# Patient Record
Sex: FEMALE | Race: WHITE | NOT HISPANIC OR LATINO | Employment: OTHER | URBAN - METROPOLITAN AREA
[De-identification: names, ages, dates, MRNs, and addresses within clinical notes are randomized per-mention and may not be internally consistent; named-entity substitution may affect disease eponyms.]

---

## 2017-05-26 ENCOUNTER — CONVERSION ENCOUNTER (OUTPATIENT)
Dept: MAMMOGRAPHY | Facility: HOSPITAL | Age: 57
End: 2017-05-26

## 2020-03-09 ENCOUNTER — OFFICE VISIT CONVERTED (OUTPATIENT)
Dept: FAMILY MEDICINE CLINIC | Facility: CLINIC | Age: 60
End: 2020-03-09
Attending: NURSE PRACTITIONER

## 2020-03-09 ENCOUNTER — HOSPITAL ENCOUNTER (OUTPATIENT)
Dept: LAB | Facility: HOSPITAL | Age: 60
Discharge: HOME | End: 2020-03-09
Attending: NURSE PRACTITIONER

## 2020-03-09 LAB
25(OH)D3 SERPL-MCNC: 33.1 NG/ML (ref 30–100)
ALBUMIN SERPL-MCNC: 4.6 G/DL (ref 3.5–5)
ALBUMIN/GLOB SERPL: 1.6 {RATIO} (ref 1.4–2.6)
ALP SERPL-CCNC: 78 U/L (ref 53–141)
ALT SERPL-CCNC: 12 U/L (ref 10–40)
ANION GAP SERPL CALC-SCNC: 17 MMOL/L (ref 8–19)
AST SERPL-CCNC: 19 U/L (ref 15–50)
BASOPHILS # BLD AUTO: 0.02 10*3/UL (ref 0–0.2)
BASOPHILS NFR BLD AUTO: 0.3 % (ref 0–3)
BILIRUB SERPL-MCNC: 0.63 MG/DL (ref 0.2–1.3)
BUN SERPL-MCNC: 11 MG/DL (ref 5–25)
BUN/CREAT SERPL: 16 {RATIO} (ref 6–20)
CALCIUM SERPL-MCNC: 10.6 MG/DL (ref 8.7–10.4)
CHLORIDE SERPL-SCNC: 103 MMOL/L (ref 99–111)
CHOLEST SERPL-MCNC: 253 MG/DL (ref 107–200)
CHOLEST/HDLC SERPL: 4.3 {RATIO} (ref 3–6)
CONV ABS IMM GRAN: 0.02 10*3/UL (ref 0–0.2)
CONV CO2: 25 MMOL/L (ref 22–32)
CONV IMMATURE GRAN: 0.3 % (ref 0–1.8)
CONV TOTAL PROTEIN: 7.4 G/DL (ref 6.3–8.2)
CREAT UR-MCNC: 0.7 MG/DL (ref 0.5–0.9)
DEPRECATED RDW RBC AUTO: 50.5 FL (ref 36.4–46.3)
EOSINOPHIL # BLD AUTO: 0.06 10*3/UL (ref 0–0.7)
EOSINOPHIL # BLD AUTO: 0.8 % (ref 0–7)
ERYTHROCYTE [DISTWIDTH] IN BLOOD BY AUTOMATED COUNT: 14.6 % (ref 11.7–14.4)
FOLATE SERPL-MCNC: 13.1 NG/ML (ref 4.8–20)
GFR SERPLBLD BASED ON 1.73 SQ M-ARVRAT: >60 ML/MIN/{1.73_M2}
GLOBULIN UR ELPH-MCNC: 2.8 G/DL (ref 2–3.5)
GLUCOSE SERPL-MCNC: 90 MG/DL (ref 65–99)
HCT VFR BLD AUTO: 44.5 % (ref 37–47)
HDLC SERPL-MCNC: 59 MG/DL (ref 40–60)
HGB BLD-MCNC: 14.5 G/DL (ref 12–16)
LDLC SERPL CALC-MCNC: 168 MG/DL (ref 70–100)
LYMPHOCYTES # BLD AUTO: 2.59 10*3/UL (ref 1–5)
LYMPHOCYTES NFR BLD AUTO: 36.1 % (ref 20–45)
MCH RBC QN AUTO: 30.4 PG (ref 27–31)
MCHC RBC AUTO-ENTMCNC: 32.6 G/DL (ref 33–37)
MCV RBC AUTO: 93.3 FL (ref 81–99)
MONOCYTES # BLD AUTO: 0.35 10*3/UL (ref 0.2–1.2)
MONOCYTES NFR BLD AUTO: 4.9 % (ref 3–10)
NEUTROPHILS # BLD AUTO: 4.14 10*3/UL (ref 2–8)
NEUTROPHILS NFR BLD AUTO: 57.6 % (ref 30–85)
NRBC CBCN: 0 % (ref 0–0.7)
OSMOLALITY SERPL CALC.SUM OF ELEC: 291 MOSM/KG (ref 273–304)
PLATELET # BLD AUTO: 329 10*3/UL (ref 130–400)
PMV BLD AUTO: 10.2 FL (ref 9.4–12.3)
POTASSIUM SERPL-SCNC: 3.9 MMOL/L (ref 3.5–5.3)
RBC # BLD AUTO: 4.77 10*6/UL (ref 4.2–5.4)
SODIUM SERPL-SCNC: 141 MMOL/L (ref 135–147)
T4 FREE SERPL-MCNC: 1 NG/DL (ref 0.9–1.8)
TRIGL SERPL-MCNC: 129 MG/DL (ref 40–150)
TSH SERPL-ACNC: 1.04 M[IU]/L (ref 0.27–4.2)
VIT B12 SERPL-MCNC: 398 PG/ML (ref 211–911)
VLDLC SERPL-MCNC: 26 MG/DL (ref 5–37)
WBC # BLD AUTO: 7.18 10*3/UL (ref 4.8–10.8)

## 2020-09-02 ENCOUNTER — OFFICE VISIT CONVERTED (OUTPATIENT)
Dept: GASTROENTEROLOGY | Facility: CLINIC | Age: 60
End: 2020-09-02
Attending: NURSE PRACTITIONER

## 2020-09-02 ENCOUNTER — HOSPITAL ENCOUNTER (OUTPATIENT)
Dept: CT IMAGING | Facility: HOSPITAL | Age: 60
Discharge: HOME | End: 2020-09-02
Attending: NURSE PRACTITIONER

## 2020-09-02 ENCOUNTER — HOSPITAL ENCOUNTER (OUTPATIENT)
Dept: LAB | Facility: HOSPITAL | Age: 60
Discharge: HOME | End: 2020-09-02
Attending: NURSE PRACTITIONER

## 2020-09-02 LAB
ALBUMIN SERPL-MCNC: 4.5 G/DL (ref 3.5–5)
ALBUMIN/GLOB SERPL: 1.5 {RATIO} (ref 1.4–2.6)
ALP SERPL-CCNC: 94 U/L (ref 53–141)
ALT SERPL-CCNC: 11 U/L (ref 10–40)
ANION GAP SERPL CALC-SCNC: 15 MMOL/L (ref 8–19)
AST SERPL-CCNC: 20 U/L (ref 15–50)
BASOPHILS # BLD AUTO: 0.03 10*3/UL (ref 0–0.2)
BASOPHILS NFR BLD AUTO: 0.3 % (ref 0–3)
BILIRUB SERPL-MCNC: 0.6 MG/DL (ref 0.2–1.3)
BUN SERPL-MCNC: 14 MG/DL (ref 5–25)
BUN/CREAT SERPL: 17 {RATIO} (ref 6–20)
CALCIUM SERPL-MCNC: 11.2 MG/DL (ref 8.7–10.4)
CHLORIDE SERPL-SCNC: 98 MMOL/L (ref 99–111)
CONV ABS IMM GRAN: 0.03 10*3/UL (ref 0–0.2)
CONV CO2: 29 MMOL/L (ref 22–32)
CONV IMMATURE GRAN: 0.3 % (ref 0–1.8)
CONV TOTAL PROTEIN: 7.5 G/DL (ref 6.3–8.2)
CREAT UR-MCNC: 0.81 MG/DL (ref 0.5–0.9)
DEPRECATED RDW RBC AUTO: 45.7 FL (ref 36.4–46.3)
EOSINOPHIL # BLD AUTO: 0.1 10*3/UL (ref 0–0.7)
EOSINOPHIL # BLD AUTO: 0.8 % (ref 0–7)
ERYTHROCYTE [DISTWIDTH] IN BLOOD BY AUTOMATED COUNT: 13 % (ref 11.7–14.4)
GFR SERPLBLD BASED ON 1.73 SQ M-ARVRAT: >60 ML/MIN/{1.73_M2}
GLOBULIN UR ELPH-MCNC: 3 G/DL (ref 2–3.5)
GLUCOSE SERPL-MCNC: 104 MG/DL (ref 65–99)
HCT VFR BLD AUTO: 46.8 % (ref 37–47)
HGB BLD-MCNC: 15.7 G/DL (ref 12–16)
LYMPHOCYTES # BLD AUTO: 3.56 10*3/UL (ref 1–5)
LYMPHOCYTES NFR BLD AUTO: 30.2 % (ref 20–45)
MCH RBC QN AUTO: 31.8 PG (ref 27–31)
MCHC RBC AUTO-ENTMCNC: 33.5 G/DL (ref 33–37)
MCV RBC AUTO: 94.9 FL (ref 81–99)
MONOCYTES # BLD AUTO: 0.63 10*3/UL (ref 0.2–1.2)
MONOCYTES NFR BLD AUTO: 5.3 % (ref 3–10)
NEUTROPHILS # BLD AUTO: 7.44 10*3/UL (ref 2–8)
NEUTROPHILS NFR BLD AUTO: 63.1 % (ref 30–85)
NRBC CBCN: 0 % (ref 0–0.7)
OSMOLALITY SERPL CALC.SUM OF ELEC: 289 MOSM/KG (ref 273–304)
PLATELET # BLD AUTO: 268 10*3/UL (ref 130–400)
PMV BLD AUTO: 11 FL (ref 9.4–12.3)
POTASSIUM SERPL-SCNC: 3.2 MMOL/L (ref 3.5–5.3)
RBC # BLD AUTO: 4.93 10*6/UL (ref 4.2–5.4)
SODIUM SERPL-SCNC: 139 MMOL/L (ref 135–147)
WBC # BLD AUTO: 11.79 10*3/UL (ref 4.8–10.8)

## 2020-09-04 LAB — CONV CELIAC DISEASE AB-IGA: 172 MG/DL (ref 68–408)

## 2020-09-05 LAB — TTG IGA SER-ACNC: <2 U/ML (ref 0–3)

## 2020-09-14 ENCOUNTER — HOSPITAL ENCOUNTER (OUTPATIENT)
Dept: LAB | Facility: HOSPITAL | Age: 60
Discharge: HOME | End: 2020-09-14
Attending: PHYSICIAN ASSISTANT

## 2020-09-14 ENCOUNTER — HOSPITAL ENCOUNTER (OUTPATIENT)
Dept: GENERAL RADIOLOGY | Facility: HOSPITAL | Age: 60
Discharge: HOME | End: 2020-09-14
Attending: PHYSICIAN ASSISTANT

## 2020-09-14 ENCOUNTER — OFFICE VISIT CONVERTED (OUTPATIENT)
Dept: FAMILY MEDICINE CLINIC | Facility: CLINIC | Age: 60
End: 2020-09-14
Attending: PHYSICIAN ASSISTANT

## 2020-09-14 ENCOUNTER — HOSPITAL ENCOUNTER (OUTPATIENT)
Dept: LAB | Facility: HOSPITAL | Age: 60
Discharge: HOME | End: 2020-09-14
Attending: NURSE PRACTITIONER

## 2020-09-14 LAB
ALBUMIN SERPL-MCNC: 4.4 G/DL (ref 3.5–5)
ALBUMIN/GLOB SERPL: 1.6 {RATIO} (ref 1.4–2.6)
ALP SERPL-CCNC: 82 U/L (ref 53–141)
ALT SERPL-CCNC: 11 U/L (ref 10–40)
ANION GAP SERPL CALC-SCNC: 21 MMOL/L (ref 8–19)
APPEARANCE UR: CLEAR
AST SERPL-CCNC: 20 U/L (ref 15–50)
BASOPHILS # BLD AUTO: 0.03 10*3/UL (ref 0–0.2)
BASOPHILS NFR BLD AUTO: 0.4 % (ref 0–3)
BILIRUB SERPL-MCNC: 0.5 MG/DL (ref 0.2–1.3)
BILIRUB UR QL: NEGATIVE
BUN SERPL-MCNC: 10 MG/DL (ref 5–25)
BUN/CREAT SERPL: 13 {RATIO} (ref 6–20)
CALCIUM SERPL-MCNC: 10.4 MG/DL (ref 8.7–10.4)
CHLORIDE SERPL-SCNC: 100 MMOL/L (ref 99–111)
CHOLEST SERPL-MCNC: 244 MG/DL (ref 107–200)
CHOLEST/HDLC SERPL: 4.4 {RATIO} (ref 3–6)
COLOR UR: YELLOW
CONV ABS IMM GRAN: 0.03 10*3/UL (ref 0–0.2)
CONV CO2: 22 MMOL/L (ref 22–32)
CONV COLLECTION SOURCE (UA): NORMAL
CONV IMMATURE GRAN: 0.4 % (ref 0–1.8)
CONV TOTAL PROTEIN: 7.2 G/DL (ref 6.3–8.2)
CONV UROBILINOGEN IN URINE BY AUTOMATED TEST STRIP: 1 {EHRLICHU}/DL (ref 0.1–1)
CREAT UR-MCNC: 0.75 MG/DL (ref 0.5–0.9)
DEPRECATED RDW RBC AUTO: 49.2 FL (ref 36.4–46.3)
EOSINOPHIL # BLD AUTO: 0.03 10*3/UL (ref 0–0.7)
EOSINOPHIL # BLD AUTO: 0.4 % (ref 0–7)
ERYTHROCYTE [DISTWIDTH] IN BLOOD BY AUTOMATED COUNT: 13.4 % (ref 11.7–14.4)
FOLATE SERPL-MCNC: 12.4 NG/ML (ref 4.8–20)
GFR SERPLBLD BASED ON 1.73 SQ M-ARVRAT: >60 ML/MIN/{1.73_M2}
GLOBULIN UR ELPH-MCNC: 2.8 G/DL (ref 2–3.5)
GLUCOSE SERPL-MCNC: 70 MG/DL (ref 65–99)
GLUCOSE UR QL: NEGATIVE MG/DL
HCT VFR BLD AUTO: 46.2 % (ref 37–47)
HDLC SERPL-MCNC: 56 MG/DL (ref 40–60)
HGB BLD-MCNC: 15 G/DL (ref 12–16)
HGB UR QL STRIP: NEGATIVE
KETONES UR QL STRIP: NEGATIVE MG/DL
LDLC SERPL CALC-MCNC: 154 MG/DL (ref 70–100)
LEUKOCYTE ESTERASE UR QL STRIP: NEGATIVE
LYMPHOCYTES # BLD AUTO: 2.73 10*3/UL (ref 1–5)
LYMPHOCYTES NFR BLD AUTO: 32.6 % (ref 20–45)
MCH RBC QN AUTO: 31.9 PG (ref 27–31)
MCHC RBC AUTO-ENTMCNC: 32.5 G/DL (ref 33–37)
MCV RBC AUTO: 98.3 FL (ref 81–99)
MONOCYTES # BLD AUTO: 0.42 10*3/UL (ref 0.2–1.2)
MONOCYTES NFR BLD AUTO: 5 % (ref 3–10)
NEUTROPHILS # BLD AUTO: 5.13 10*3/UL (ref 2–8)
NEUTROPHILS NFR BLD AUTO: 61.2 % (ref 30–85)
NITRITE UR QL STRIP: NEGATIVE
NRBC CBCN: 0 % (ref 0–0.7)
OSMOLALITY SERPL CALC.SUM OF ELEC: 285 MOSM/KG (ref 273–304)
PH UR STRIP.AUTO: 5.5 [PH] (ref 5–8)
PLATELET # BLD AUTO: 269 10*3/UL (ref 130–400)
PMV BLD AUTO: 11.4 FL (ref 9.4–12.3)
POTASSIUM SERPL-SCNC: 3.8 MMOL/L (ref 3.5–5.3)
PROT UR QL: NEGATIVE MG/DL
PTH-INTACT SERPL-MCNC: 39.3 PG/ML (ref 11.1–79.5)
RBC # BLD AUTO: 4.7 10*6/UL (ref 4.2–5.4)
SODIUM SERPL-SCNC: 139 MMOL/L (ref 135–147)
SP GR UR: 1.01 (ref 1–1.03)
T4 FREE SERPL-MCNC: 1.1 NG/DL (ref 0.9–1.8)
TRIGL SERPL-MCNC: 171 MG/DL (ref 40–150)
TSH SERPL-ACNC: 0.86 M[IU]/L (ref 0.27–4.2)
VIT B12 SERPL-MCNC: 308 PG/ML (ref 211–911)
VLDLC SERPL-MCNC: 34 MG/DL (ref 5–37)
WBC # BLD AUTO: 8.37 10*3/UL (ref 4.8–10.8)

## 2020-09-15 LAB
IRON SATN MFR SERPL: 15 % (ref 20–55)
IRON SERPL-MCNC: 64 UG/DL (ref 60–170)
TIBC SERPL-MCNC: 415 UG/DL (ref 245–450)
TRANSFERRIN SERPL-MCNC: 290 MG/DL (ref 250–380)

## 2020-09-16 LAB
BACTERIA SPEC AEROBE CULT: NORMAL
CONV CALCIUM IONIZED: 6 MG/DL (ref 4.5–5.6)

## 2020-10-23 ENCOUNTER — HOSPITAL ENCOUNTER (OUTPATIENT)
Dept: GENERAL RADIOLOGY | Facility: HOSPITAL | Age: 60
Discharge: HOME | End: 2020-10-23
Attending: PHYSICIAN ASSISTANT

## 2020-10-30 ENCOUNTER — HOSPITAL ENCOUNTER (OUTPATIENT)
Dept: PREADMISSION TESTING | Facility: HOSPITAL | Age: 60
Discharge: HOME | End: 2020-10-30
Attending: INTERNAL MEDICINE

## 2020-11-01 LAB — SARS-COV-2 RNA SPEC QL NAA+PROBE: NOT DETECTED

## 2020-11-04 ENCOUNTER — HOSPITAL ENCOUNTER (OUTPATIENT)
Dept: GASTROENTEROLOGY | Facility: HOSPITAL | Age: 60
Setting detail: HOSPITAL OUTPATIENT SURGERY
Discharge: HOME | End: 2020-11-04
Attending: INTERNAL MEDICINE

## 2021-05-10 NOTE — H&P
History and Physical      Patient Name: Cindy Bella   Patient ID: [id number]   Sex: Female   YOB: 1960    Primary Care Provider: Kimberly BARAHONA   Referring Provider: Tina BARAHONA    Visit Date: September 2, 2020    Provider: BROOKLYN Mujica   Location: [facility]   Location Address: [address] [place] [address], [address]  [place], [place]  [id number]   Location Phone: [phone]          Chief Complaint  · Diarrhea      History Of Present Illness  The patient is a 59 year old /White female who presents on referral from Tina BARAHONA for a gastroenterology evaluation.      She presents for evaluation of diarrhea.      She reports that when she eats, she has diarrhea.  States that diarrhea usually occurs within 30 minutes of eating.  Admits abdominal pain that's worse with meals.  Admits loss of appetite.      She has experienced a 39 lb weight loss in the past 3 months.      She reports having a brain tumor that she's had for several years.  She reports dementia that's worsening.  She does not live alone.      She has scoliosis.  Reports that she can't lay on right side due to pressure in abdomen.      3/30/2016 EGD/colonoscopy (Dr. Urena)-erosive gastritis in the antrum.  2 mm polyp in the rectum-hyperplastic, completely removed.  Random colon biopsies-lymphocytic colitis.  Duodenal biopsy-focal nonspecific villous blunting.  Gastric antrum-chronic inactive gastritis.  Positive for H. pylori.           Past Medical History  Anxiety; Arthritis; Brain Neoplasm, Malignant; Depression; Scoliosis; SVT (Supraventricular Tachycardia); Urethral stricture         Past Surgical History  Appendectomy; Bladder Biopsy; Cystoscopy; Hernia; Hysterectomy; Lumpectomy, left breast; Urethral dilatation         Medication List  amlodipine 2.5 mg oral tablet; aspirin 325 mg oral tablet; buspirone 5 mg oral tablet; citalopram 40 mg oral tablet;  "losartan-hydrochlorothiazide 50-12.5 mg oral tablet; methocarbamol 750 mg oral tablet; omeprazole 20 mg oral capsule,delayed release(DR/EC); propranolol 80 mg oral tablet; Spiriva Respimat 1.25 mcg/actuation inhalation mist; Suprep Bowel Prep Kit 17.5-3.13-1.6 gram oral recon soln; trazodone 50 mg oral tablet; Ventolin HFA 90 mcg/actuation inhalation HFA aerosol inhaler         Allergy List  Augmentin; Imitrex; Levaquin; NSAIDS; Sudafed       Allergies Reconciled  Family Medical History  Breast Neoplasm, Malignant; Pancreatic Neoplasm, Malignant; Thyroid Gland Neoplasm, Malignant; Hyperlipidemia; Cancer, Unspecified; Hypertension; Coronary artery disease; Family history of stroke         Social History  Alcohol; Tobacco (Current every day)         Review of Systems  · Constitutional  o Denies  o : chills, fever  · Eyes  o Denies  o : blurred vision, changes in vision  · Cardiovascular  o Denies  o : chest pain, irregular heart beats  · Respiratory  o Denies  o : shortness of breath, cough  · Gastrointestinal  o Admits  o : See HPI  · Genitourinary  o Denies  o : dysuria, blood in urine  · Integument  o Denies  o : rash, new skin lesions  · Neurologic  o Denies  o : tingling or numbness  · Musculoskeletal  o Denies  o : joint pain, joint swelling  · Endocrine  o Denies  o : weight gain, weight loss  · Psychiatric  o Denies  o : anxiety, depression      Vitals  Date Time BP Position Site L\R Cuff Size HR RR TEMP (F) WT  HT  BMI kg/m2 BSA m2 O2 Sat        09/02/2020 10:21 /65 Sitting      98 94lbs 4oz 5'  3\" 16.7 1.38           Physical Examination  · Constitutional  o Appearance  o : well developed, well-nourished, in no acute distress  · Eyes  o Vision  o :   § Visual Fields  § : eyes move symmetrical in all directions  o Sclerae  o : anicteric  o Pupils and Irises  o : pupils equal and symmetrical  · Neck  o Inspection/Palpation  o : supple  · Respiratory  o Respiratory Effort  o : breathing " unlabored  o Inspection of Chest  o : normal appearance, no retractions  o Auscultation of Lungs  o : clear to auscultation bilaterally  · Cardiovascular  o Heart  o :   § Auscultation of Heart  § : no murmurs, gallops or rubs  · Gastrointestinal  o Abdominal Examination  o : soft, nontender to palpation, with normal active bowel sounds, no appreciable hepatosplenomegaly  o Digital Rectal Exam  o : deferred  · Lymphatic  o Neck  o : no palpable lymphadenopathy  · Skin and Subcutaneous Tissue  o General Inspection  o : without focal lesions; turgor is normal  · Psychiatric  o General  o : Alert and oriented x3  o Mood and Affect  o : Mood and affect are appropriate to circumstances  · Extremities  o Extremities  o : No edema, no cyanosis          Assessment  · Pre-op testing     V72.84/Z01.818  · Abdominal pain     789.00/R10.9  · Diarrhea     787.91/R19.7  · Nausea alone     787.02/R11.0  · Weight loss     783.21/R63.4  · Anorexia     783.0/R63.0    Problems Reconciled  Plan  · Orders  o [facility] Pre-Op Covid-19 Screening (81716) - - 09/02/2020  o C difficile Toxigenic Assay (PCR) [facility] (84654) - - 09/02/2020  o Stool culture and sensitivity (69169) - - 09/02/2020  o Giardia and Cryptosporidium Enzyme Immunoassay [facility] (02835, 06377) - - 09/02/2020  o Lactoferrin (Fecal) Qualitative (48699) - - 09/02/2020  o Celiac Disease Reflexive Panel [facility] (CELID) - - 09/02/2020  o CBC with Auto Diff [facility] (70177) - - 09/02/2020  o CMP [facility] (62759) - - 09/02/2020  o CT abdomen and pelvis with contrast (93506) - - 09/02/2020  o Consent for Colonoscopy with Possible Biopsy - Possible risks/complications, benefits, and alternatives to surgical or invasive procedure have been explained to patient and/or legal guardian. -Patient has been evaluated and can tolerate anesthesia and/or sedation. Risks, benefits, and alternatives to anesthesia and sedation have been explained to patient and/or legal guardian. (45105) - - 09/02/2020  o Consent for  Esophagogastroduodenoscopy (EGD) - Possible risks/complications, benefits, and alternatives to surgical or invasive procedure have been explained to patient and/or legal guardian. - Patient has been evaluated and can tolerate anesthesia and/or sedation. Risks, benefits, and alternatives to anesthesia and sedation have been explained to patient and/or legal guardian. (15960) - - 09/02/2020  · Medications  o ondansetron 8 mg oral tablet,disintegrating   SIG: dissolve 1 tablet by oral route every 8 hours as needed nausea   DISP: (20) tablets with 1 refills  Prescribed on 09/02/2020     o Medications have been Reconciled  o Transition of Care or Provider Policy  · Instructions  o Handouts provided: Pre-procedure instructions including date and time and location of procedure.  o PLAN: Proceed with procedure. Patient understands risks and benefits and is willing to proceed. Understands the risks include, but are not limited to, bleeding and/or perforation.  o Information given on current diagnoses.  o Electronically Identified Patient Education Materials Provided Electronically  · Disposition  o Follow Up after procedure            Electronically Signed by: BROOKLYN Mujica -Author on September 3, 2020 04:10:01 PM

## 2021-05-13 ENCOUNTER — CONVERSION ENCOUNTER (OUTPATIENT)
Dept: FAMILY MEDICINE CLINIC | Facility: CLINIC | Age: 61
End: 2021-05-13

## 2021-05-13 ENCOUNTER — OFFICE VISIT CONVERTED (OUTPATIENT)
Dept: FAMILY MEDICINE CLINIC | Facility: CLINIC | Age: 61
End: 2021-05-13
Attending: NURSE PRACTITIONER

## 2021-05-13 NOTE — PROGRESS NOTES
"   Progress Note      Patient Name: Cindy Bella   Patient ID: [id number]   Sex: Female   YOB: 1960    Primary Care Provider: Kimberly BARAHONA   Referring Provider: Tina BARAHONA    Visit Date: September 14, 2020    Provider: HAYES Sears   Location: [facility]   Location Address: [address] [place] [address], [address]  [place], [place]  [id number]   Location Phone: [phone]          Chief Complaint  · follow up/medication refills      History Of Present Illness  Cindy Bella is a 59 year old /White female who presents for evaluation and treatment of:      patient is here for follow up/medication refills    She states she has still been loosing weight (weight loss of about 20 pounds in the past year) & has seen GI. She states she has severe pain if she eats anything but is able to drink Mt Dew which she states is what she is \"living on\"    HTN: patient taking Losartan/HCTZ, amlodipine & propranolol & states she is doing well. Denies CP/SOA/Edema/Headaches.     Anxiety: She is currently taking Celexa & Buspar & doing well    COPD: She uses Spiriva & Albuterol inhalers; pt is still smoking. Rec'd nicotine patches at [facility] in March but did not use them. Unsure when her last low dose CT was done.    Mid and low back pain: pt states that she has known scoliosis but her back has been hurting more in the past few weeks. She deines injury or trauma. She states that she smokes marijuana to help with the pain. She refuses to \"be a statistic addicted to pain medication\".    Brain Tumor: pt states memory decline and cannot remember appts. She is currently being followed by Dr. Rutledge for a brain tumor.       Past Medical History  Disease Name Date Onset Notes   Anxiety --  --    Arthritis --  --    Brain Neoplasm, Malignant --  --    Depression --  --    Scoliosis --  --    SVT (Supraventricular Tachycardia) --  --    Urethral stricture 07/23/2015 --  "         Past Surgical History  Procedure Name Date Notes   Appendectomy --  --    Bladder Biopsy --  --    Cystoscopy --  --    Hernia --  --    Hysterectomy --  --    Lumpectomy, left breast --  --    Urethral dilatation --  --          Medication List  Name Date Started Instructions   amlodipine 2.5 mg oral tablet 07/24/2020 take 1 tablet (2.5 mg) by oral route once daily for 90 days   aspirin 325 mg oral tablet  take 1 tablet (325 mg) by oral route once daily   buspirone 5 mg oral tablet 07/24/2020 take 1 tablet (5 mg) by oral route 2 times per day for 90 days   citalopram 40 mg oral tablet 07/24/2020 take 1 tablet (40 mg) by oral route once daily for 90 days   losartan-hydrochlorothiazide 50-12.5 mg oral tablet 07/24/2020 take 2 tablets by oral route daily for 90 days   methocarbamol 750 mg oral tablet 07/24/2020 take 1 tablet by oral route 2 times a day for 90 days   omeprazole 20 mg oral capsule,delayed release(DR/EC) 07/24/2020 take 1 capsule (20 mg) by oral route once daily before a meal for 90 days   ondansetron 8 mg oral tablet,disintegrating 09/02/2020 dissolve 1 tablet by oral route every 8 hours as needed nausea   potassium chloride 10 mEq oral capsule, extended release 09/09/2020 take 1 capsule (10 meq) by oral route once daily with food for 4 days   propranolol 80 mg oral tablet 07/24/2020 take 1 tablet (80 mg) by oral route 2 times per day for 90 days   Spiriva Respimat 1.25 mcg/actuation inhalation mist  inhale 2 puffs (2.5 mcg) by inhalation route once daily   Suprep Bowel Prep Kit 17.5-3.13-1.6 gram oral recon soln 09/02/2020 follow written instructions given by ordering providers office   trazodone 50 mg oral tablet 07/24/2020 take 2 tablets (100 mg) by oral route once daily at bedtime for 90 days   Ventolin HFA 90 mcg/actuation inhalation HFA aerosol inhaler  inhale 1 puff (90 mcg) by inhalation route every 6 hours as needed         Allergy List  Allergen Name Date Reaction Notes   Augmentin --   "--  --    Imitrex --  --  --    Levaquin --  nausea/diarrhea --    NSAIDS --  --  --    Sudafed --  --  --          Family Medical History  Disease Name Relative/Age Notes   Breast Neoplasm, Malignant  --    Pancreatic Neoplasm, Malignant  --    Thyroid Gland Neoplasm, Malignant  --    Hyperlipidemia  --    Cancer, Unspecified  --    Hypertension Aunt/  Brother/  Grandfather (maternal)/  Grandmother (maternal)/  Grandmother (paternal)/  Mother/  Sister/   --    Coronary artery disease  --    Family history of stroke Aunt/  Mother/  Sister/   --          Social History  Finding Status Start/Stop Quantity Notes   Alcohol --  --/-- --  --    Tobacco Current every day --/-- 3/4 ppd --          Review of Systems  · Constitutional  o Admits  o : fatigue, weight loss  o Denies  o : night sweats  · Eyes  o Denies  o : double vision, blurred vision  · HENT  o Denies  o : vertigo, recent head injury  · Breasts  o Denies  o : abnormal changes in breast size, additional breast symptoms except as noted in the HPI  · Cardiovascular  o Denies  o : chest pain, irregular heart beats  · Respiratory  o Denies  o : shortness of breath, productive cough  · Gastrointestinal  o Denies  o : nausea, vomiting  · Genitourinary  o Denies  o : dysuria, urinary retention  · Integument  o Denies  o : hair growth change, new skin lesions  · Neurologic  o Admits  o : altered mental status, memory difficulties  o Denies  o : seizures  · Musculoskeletal  o Admits  o : back pain  o Denies  o : joint swelling, limitation of motion  · Endocrine  o Denies  o : cold intolerance, heat intolerance  · Heme-Lymph  o Denies  o : petechiae, lymph node enlargement or tenderness  · Allergic-Immunologic  o Denies  o : frequent illnesses      Vitals  Date Time BP Position Site L\R Cuff Size HR RR TEMP (F) WT  HT  BMI kg/m2 BSA m2 O2 Sat HC       09/14/2020 10:56 /65 Sitting    94 - R  98.2 96lbs 2oz 5'  3\" 17.03 1.39 97 %          Physical " Examination  · Constitutional  o Appearance  o : well developed, well-nourished, no acute distress  · Head and Face  o Head  o : normocephalic, atraumatic  · Neck  o Inspection/Palpation  o : normal appearance, no masses or tenderness, trachea midline  o Thyroid  o : gland size normal, nontender, no nodules or masses present on palpation  · Respiratory  o Respiratory Effort  o : breathing unlabored  o Inspection of Chest  o : chest rise symmetric bilaterally  o Auscultation of Lungs  o : clear to auscultation bilaterally throughout inspiration and expiration  · Cardiovascular  o Heart  o :   § Auscultation of Heart  § : regular rate and rhythm, no murmurs, gallops or rubs  o Peripheral Vascular System  o :   § Extremities  § : no edema  · Gastrointestinal  o Abdomen  o : soft, non-tender, non-distended, + bowel sounds  · Lymphatic  o Neck  o : no cervical lymphadenopathy, no supraclavicular lymphadenopathy  · Psychiatric  o Mood and Affect  o : mood normal, affect appropriate  · Back  o Inspection  o : no redness, soliosis   o Palpation  o : tednerness present, swelling present   o Range of Motion  o : flexion abnormal < 60 degrees   o Gait  o : normal gait  o Special Tests  o : bilateral straight leg raise negative          Assessment  · Anxiety disorder     300.00/F41.9  · COPD (chronic obstructive pulmonary disease)     496/J44.9  · Essential hypertension     401.9/I10  · Fatigue     780.79/R53.83  · GERD (gastroesophageal reflux disease)     530.81/K21.9  · Hyperlipidemia     272.4/E78.5  · Nicotine dependence     305.1/F17.200  · Other long term (current) drug therapy     V58.69/Z79.899  · Vitamin D deficiency     268.9/E55.9  · Weight loss     783.21/R63.4  · Scoliosis     737.30/M41.9  · Back pain     724.5/M54.9  · B12 deficiency     266.2/E53.8  · Hypercalcemia     275.42/E83.52    Problems Reconciled  Plan  · Orders  o HTN/Lipid Panel (CMP, Lipid) [facility] (04636, 37086) - 401.9/I10 - 09/14/2020  o CBC with  Auto Diff [facility] (16613) - 401.9/I10 - 09/14/2020  o Urinalysis with Reflex Microscopy if abnormal ([facility]) (68885) - 401.9/I10 - 09/14/2020  o Iron panel (iron, TIBC, transferrin saturation) (29575, 40736, 23080) - 780.79/R53.83 - 09/14/2020  o Thyroid Profile (THYII, 70664, 95652) - 780.79/R53.83 - 09/14/2020  o B12 Folate levels (B12FO) - 780.79/R53.83 - 09/14/2020  o ACO-17: Screened for tobacco use AND received tobacco cessation intervention (4004F) - 305.1/F17.200 - 09/14/2020  o ACO-39: Current medications updated and reviewed () - - 09/14/2020  o Ionized calcium assay (09319) - 275.42/E83.52 - 09/14/2020  o PTH (parathyroid hormone) measurement (21708) - 275.42/E83.52 - 09/14/2020  o Xray thoracic spine 3 views [facility] (82817) - 724.5/M54.9 - 09/14/2020  o Lumbar Spine Complete X-Ray [facility] (97225) - 724.5/M54.9 - 09/14/2020  o Low dose CT scan (LDCT) for lung cancer screening [facility] () - 783.21/R63.4, 305.1/F17.200 - 09/14/2020  · Medications  o Nicoderm CQ 14 mg/24 hr transdermal patch 24 hour   SIG: apply 1 patch (14 mg) by transdermal route once daily for 6weeks then decrease to 7mg patch for 2 wks   DISP: (42) patches with 0 refills  Prescribed on 09/15/2020     o Ventolin HFA 90 mcg/actuation inhalation HFA aerosol inhaler   SIG: inhale 1 puff (90 mcg) by inhalation route every 6 hours as needed for 90 days   DISP: (3) 8 gm canister with 1 refills  Prescribed on 09/15/2020     o amlodipine 2.5 mg oral tablet   SIG: take 1 tablet (2.5 mg) by oral route once daily for 90 days   DISP: (90) tablet with 1 refills  Refilled on 09/15/2020     o buspirone 5 mg oral tablet   SIG: take 1 tablet (5 mg) by oral route 2 times per day for 90 days for 90 days   DISP: (180) tablet with 1 refills  Refilled on 09/15/2020     o citalopram 40 mg oral tablet   SIG: take 1 tablet (40 mg) by oral route once daily for 90 days for 90 days   DISP: (90) tablet with 1 refills  Refilled on 09/15/2020      o losartan-hydrochlorothiazide 50-12.5 mg oral tablet   SIG: take 2 tablets by oral route daily for 90 days for 90 days   DISP: (180) tablet with 1 refills  Refilled on 09/15/2020     o methocarbamol 750 mg oral tablet   SIG: take 1 tablet by oral route 2 times a day for 90 days for 30 days   DISP: (60) tablet with 0 refills  Refilled on 09/15/2020     o propranolol 80 mg oral tablet   SIG: take 1 tablet (80 mg) by oral route 2 times per day for 90 days for 90 days   DISP: (180) tablet with 1 refills  Refilled on 09/15/2020     o trazodone 50 mg oral tablet   SIG: take 2 tablets (100 mg) by oral route once daily at bedtime for 90 days for 90 days   DISP: (180) tablet with 1 refills  Refilled on 09/15/2020     o Medications have been Reconciled  o Transition of Care or Provider Policy  · Instructions  o Patient advised to monitor blood pressure (B/P) at home and journal readings. Patient informed that a B/P reading at home of more than 130/80 is considered hypertension. For readings greater [id number]/90 or higher patient is advised to follow up in the office with readings for management. Patient advised to limit sodium intake.  o Advised that cheeses and other sources of dairy fats, animal fats, fast food, and the extras (candy, pastries, pies, doughnuts and cookies) all contain LDL raising nutrients. Advised to increase fruits, vegetables, whole grains, and to monitor portion sizes.   o *Form of nicotine being used: cigarettes  o Patient was strongly encouraged to discontinue use of any nicotine containing product or minimize the use of the product.  o Discussed smoking cessation and counseling with patient for over 3 minutes.  o Take all medications as prescribed/directed.  o Patient was educated/instructed on their diagnosis, treatment and medications prior to discharge from the clinic today.  o Patient counseled to stop smoking.  o Patient instructed to seek medical attention urgently for new or worsening  symptoms.  o Call the office with any concerns or questions.  o Bring all medicines with their bottles to each office visit.  o Chronic conditions reviewed and taken into consideration for today's treatment plan.  o Electronically Identified Patient Education Materials Provided Electronically            Electronically Signed by: HAYES Sears -Author on September 15, 2020 05:03:30 PM

## 2021-05-14 VITALS
BODY MASS INDEX: 16.7 KG/M2 | DIASTOLIC BLOOD PRESSURE: 65 MMHG | HEIGHT: 63 IN | WEIGHT: 94.25 LBS | TEMPERATURE: 98 F | SYSTOLIC BLOOD PRESSURE: 135 MMHG

## 2021-05-14 VITALS
TEMPERATURE: 98.2 F | HEART RATE: 94 BPM | WEIGHT: 96.12 LBS | OXYGEN SATURATION: 97 % | BODY MASS INDEX: 17.03 KG/M2 | DIASTOLIC BLOOD PRESSURE: 65 MMHG | SYSTOLIC BLOOD PRESSURE: 111 MMHG | HEIGHT: 63 IN

## 2021-05-22 ENCOUNTER — TRANSCRIBE ORDERS (OUTPATIENT)
Dept: FAMILY MEDICINE CLINIC | Facility: CLINIC | Age: 61
End: 2021-05-22

## 2021-05-22 DIAGNOSIS — Z12.31 VISIT FOR SCREENING MAMMOGRAM: Primary | ICD-10-CM

## 2021-06-05 NOTE — PROGRESS NOTES
Progress Note      Patient Name: Cindy Bella   Patient ID: [id number]   Sex: Female   YOB: 1960    Primary Care Provider: Tina BARAHONA   Referring Provider: Tina BARAHONA    Visit Date: May 13, 2021    Provider: BROOKLYN Bowen   Location: [facility]   Location Address: [address] [place] [address], [address]  [place], [place]  [id number]   Location Phone: [phone]          Chief Complaint  · f/u- HTN, anxiety/depression      History Of Present Illness  Cindy Bella is a 60 year old /White female who presents for evaluation and treatment of:      Patient is here for a follow up. Patient is needing a r/f on Amlodipine, Losartan-HCTZ, Propanolol, Ventolin, Spiriva, Crestor, Methocarbamol, Ondanestron, Promethazine, Buspirone, Citalopram, and Trazodone.     Patient has a hx of     LBP-Baclofen,- states this is not helping any more.  methocarbamol- states has been out of the methocarbamol for a while   HTN- Amlodipine, Losartan-HCTZ, Propanolol  Asthma- Ventolin, Spiriva  HLD- Crestor    GERD- Omeprzole, Ondanestron, Promethazine  Anxiety/depression- Buspirone, Citalopram, Trazodone- states anxiety not well controlled at present, states having some panic attacks at times , states depression is well controlled     Patient's last mammo was 2020.    Patient's last colonoscopy was 2020.    Patient's PHQ9 score was 18. Patient denies any SI/HI.    pt admits is a ppd smoker- states she notices she is getting more SOB in last few weeks     pt states still having a lot of GI issues causing her to not eat and lose weight, saw GI and had scope but has not had f/u yet- pt states she wants number to call and sched f/u with them.       Past Medical History  Disease Name Date Onset Notes   Anxiety --  --    Arthritis --  --    Brain Neoplasm, Malignant --  --    Depression --  --    Scoliosis --  --    SVT (Supraventricular Tachycardia) --  --    Urethral  Stricture 07/23/2015 --          Past Surgical History  Procedure Name Date Notes   Appendectomy --  --    Bladder Biopsy --  --    Cystoscopy --  --    Hernia --  --    Hysterectomy --  --    Lumpectomy, left breast --  --    Urethral dilatation --  --          Medication List  Name Date Started Instructions   amlodipine 2.5 mg oral tablet 05/13/2021 take 1 tablet (2.5 mg) by oral route once daily for 90 days   aspirin 325 mg oral tablet  take 1 tablet (325 mg) by oral route once daily   buspirone 10 mg oral tablet 05/13/2021 take 1 tablet (10 mg) by oral route 2 times per day for 90 days   citalopram 40 mg oral tablet 05/13/2021 take 1 tablet (40 mg) by oral route once daily for 90 days for 90 days   Crestor 5 mg oral tablet 05/13/2021 take 1 tablet (5 mg) by oral route once daily for 30 days   losartan-hydrochlorothiazide 50-12.5 mg oral tablet 05/13/2021 take 2 tablets by oral route daily for 90 days for 90 days   methocarbamol 750 mg oral tablet 05/13/2021 take 1 tablet by oral route 2 times a day for 90 days for 30 days   Nicoderm CQ 14 mg/24 hr transdermal patch 24 hour 09/15/2020 apply 1 patch (14 mg) by transdermal route once daily for 6weeks then decrease to 7mg patch for 2 wks   omeprazole 20 mg oral capsule,delayed release(DR/EC) 09/24/2020 take 1 capsule (20 mg) by oral route once daily before a meal for 90 days for 30 days   ondansetron 8 mg oral tablet,disintegrating 11/17/2020 dissolve 1 tablet by oral route every 8 hours as needed nausea   potassium chloride 10 mEq oral capsule, extended release 09/18/2020 take 1 capsule (10 meq) by oral route once daily with food for 4 days   promethazine 12.5 mg oral tablet 09/30/2020 take 1 tablet (12.5 mg) by oral route every 6 hours as needed   propranolol 80 mg oral tablet 05/13/2021 take 1 tablet (80 mg) by oral route 2 times per day for 90 days for 90 days   Spiriva Respimat 1.25 mcg/actuation inhalation mist 05/13/2021 inhale 2 puffs (2.5 mcg) by inhalation  "route once daily for 90 days   Suprep Bowel Prep Kit 17.5-3.13-1.6 gram oral recon soln 09/02/2020 follow written instructions given by ordering providers office   trazodone 50 mg oral tablet 05/13/2021 take 2 tablets (100 mg) by oral route once daily at bedtime for 90 days for 90 days   Ventolin HFA 90 mcg/actuation inhalation HFA aerosol inhaler 05/13/2021 inhale 1 puff (90 mcg) by inhalation route every 6 hours as needed for 90 days         Allergy List  Allergen Name Date Reaction Notes   Augmentin --  --  --    Imitrex --  --  --    Levaquin --  nausea/diarrhea --    NSAIDS --  --  --    Sudafed --  --  --          Family Medical History  Disease Name Relative/Age Notes   Breast Neoplasm, Malignant  --    Pancreatic Neoplasm, Malignant  --    Thyroid Gland Neoplasm, Malignant  --    Hyperlipidemia  --    Cancer, Unspecified  --    Hypertension Aunt/  Brother/  Grandfather (maternal)/  Grandmother (maternal)/  Grandmother (paternal)/  Mother/  Sister/   --    Coronary artery disease  --    Family history of stroke Aunt/  Mother/  Sister/   --          Social History  Finding Status Start/Stop Quantity Notes   Alcohol --  --/-- --  --    Tobacco Current every day --/-- 3/4 ppd --          Review of Systems  · Constitutional  o Admits  o : weigth loss- ongoing for a long while per pt   o Denies  o : fever, fatigue,weight gain  · Cardiovascular  o Denies  o : lower extremity edema, claudication, chest pressure, palpitations  · Respiratory  o Admits  o : SOB, \"smokers cough\"   o Denies  o : wheezing, hemoptysis, dyspnea on exertion  · Gastrointestinal  o Admits  o : \"constant stomach issues\" nausea   o Denies  o : , vomiting, onstipation, abdominal pain      Vitals  Date Time BP Position Site L\R Cuff Size HR RR TEMP (F) WT  HT  BMI kg/m2 BSA m2 O2 Sat FR L/min FiO2 HC       10/28/2014 01:55 PM       14  125lbs 0oz 5'  3\" 22.14 1.59       07/23/2015 01:06 /62 Sitting       128lbs 0oz 5'  3\" 22.67 1.61     " "  09/02/2020 10:21 /65 Sitting      98 94lbs 4oz 5'  3\" 16.7 1.38       09/14/2020 10:56 /65 Sitting    94 - R  98.2 96lbs 2oz 5'  3\" 17.03 1.39 97 %  21%    05/13/2021 10:02 /73 Sitting    62 - R 18  94lbs 4oz 5'  3\" 16.7 1.38 96 %  21%          Physical Examination  · Constitutional  o Appearance  o : well developed, well-nourished, no acute distress  · Head and Face  o Head  o : normocephalic, atraumatic  · Ears, Nose, Mouth and Throat  o Ears  o :   § External Ears  § : external auditory canal appearance normal, no discharge present  § Otoscopic Examination  § : tympanic membranes pearly white/gray bilaterally  o Nose  o :   § External Nose  § : no lesions noted  § Nasopharynx  § : no discharge present  o Oral Cavity  o :   § Oral Mucosa  § : oral mucosa light pink  o Throat  o :   § Oropharynx  § : tonsils without exudate, no palatal petechiae  · Neck  o Inspection/Palpation  o : normal appearance, no masses or tenderness, trachea midline  o Thyroid  o : gland size normal, nontender, no nodules or masses present on palpation  · Respiratory  o Respiratory Effort  o : breathing unlabored  o Inspection of Chest  o : chest rise symmetric bilaterally  o Auscultation of Lungs  o : wheezing bilat   · Cardiovascular  o Heart  o :   § Auscultation of Heart  § : regular rate and rhythm, no murmurs, gallops or rubs  o Peripheral Vascular System  o :   § Extremities  § : no edema  · Lymphatic  o Neck  o : no cervical lymphadenopathy, no supraclavicular lymphadenopathy  · Psychiatric  o Mood and Affect  o : mood normal, affect appropriate          Assessment  · Screening for depression     V79.0/Z13.89  · Visit for screening mammogram     V76.12/Z12.31  · Anxiety disorder     300.00/F41.9  will trial increasing buspar dose , pt does not wish to change celexa   · COPD (chronic obstructive pulmonary disease)     496/J44.9  · Depression     311/F32.9  · Essential hypertension     401.9/I10  · GERD " (gastroesophageal reflux disease)     530.81/K21.9  · Hyperlipidemia     272.4/E78.5  · Lumbago     724.2/M54.5  will d/c baclofen as pt states this no longer helps, re-start methocarbamol   · Nicotine dependence     305.1/F17.200  discussed need to stop- pt states she is working on this- does not want medication- is trying to switch to vaping to help quit  · Osteoarthritis     715.90/M19.90  · Weight loss     783.21/R63.4  · SOB (shortness of breath)     786.05/R06.02  pt is smoker and is wheezing, states she has been out of spirvia for a while, re-start this med, will tx with MDP today and obtain CXR. f/u if no improvement. need to stop smoking       Plan  · Orders  o Annual depression screening, 15 minutes (, 79213) - V79.0/Z13.89 - 05/13/2021  o Screening Mammography; Bilateral 3D (53575, , 58142) - V76.12/Z12.31 - 05/13/2021  o Chest xray 2 views [facility] (97687) - 496/J44.9 - 05/13/2021   wheezing   o Smoking cessation counseling, 3-10 minutes [facility] (79675) - 496/J44.9 - 05/13/2021  o ACO-17: Screened for tobacco use AND received tobacco cessation intervention (4004F) - 496/J44.9 - 05/13/2021  o Smoking cessation counseling, 3-10 minutes [facility] (26744) - 305.1/F17.200 - 05/13/2021   discussed with patient to stop smoking for 3 min  o ACO-17: Screened for tobacco use AND received tobacco cessation intervention (4004F) - 305.1/F17.200 - 05/13/2021  o Physical, Primary Care Panel (CBC, CMP, Lipid, TSH) [facility] (08336, 76317, 94044, 57491) - 401.9/I10, 272.4/E78.5, 783.21/R63.4 - 05/13/2021  o Thyroid Profile (86542, 80695, THYII) - 783.21/R63.4 - 05/13/2021  o ACO-17: Screened for tobacco use AND received tobacco cessation intervention (4004F) - - 05/13/2021  o ACO-19: Colorectal cancer screening results documented and reviewed (3017F) - - 05/13/2021  o ACO-39: Current medications updated and reviewed (1159F, ) - - 05/13/2021  · Medications  o Medrol (Hong) 4 mg oral tablets,dose pack   SIG: take by oral route as  directed per package instructions   DISP: (1) Package with 0 refills  Prescribed on 05/13/2021     o Spiriva Respimat 1.25 mcg/actuation inhalation mist   SIG: inhale 2 puffs (2.5 mcg) by inhalation route once daily for 90 days   DISP: (180) Inhaler with 1 refills  Prescribed on 05/13/2021     o buspirone 10 mg oral tablet   SIG: take 1 tablet (10 mg) by oral route 2 times per day for 90 days   DISP: (180) Tablet with 1 refills  Adjusted on 05/13/2021     o amlodipine 2.5 mg oral tablet   SIG: take 1 tablet (2.5 mg) by oral route once daily for 90 days   DISP: (90) Tablet with 1 refills  Refilled on 05/13/2021     o citalopram 40 mg oral tablet   SIG: take 1 tablet (40 mg) by oral route once daily for 90 days for 90 days   DISP: (90) Tablet with 1 refills  Refilled on 05/13/2021     o Crestor 5 mg oral tablet   SIG: take 1 tablet (5 mg) by oral route once daily for 30 days   DISP: (30) Tablet with 5 refills  Refilled on 05/13/2021     o losartan-hydrochlorothiazide 50-12.5 mg oral tablet   SIG: take 2 tablets by oral route daily for 90 days for 90 days   DISP: (180) Tablet with 1 refills  Refilled on 05/13/2021     o methocarbamol 750 mg oral tablet   SIG: take 1 tablet by oral route 2 times a day for 90 days for 30 days   DISP: (60) Tablet with 2 refills  Refilled on 05/13/2021     o propranolol 80 mg oral tablet   SIG: take 1 tablet (80 mg) by oral route 2 times per day for 90 days for 90 days   DISP: (180) Tablet with 1 refills  Refilled on 05/13/2021     o trazodone 50 mg oral tablet   SIG: take 2 tablets (100 mg) by oral route once daily at bedtime for 90 days for 90 days   DISP: (180) Tablet with 1 refills  Refilled on 05/13/2021     o Ventolin HFA 90 mcg/actuation inhalation HFA aerosol inhaler   SIG: inhale 1 puff (90 mcg) by inhalation route every 6 hours as needed for 90 days   DISP: (3) Insert with 1 refills  Refilled on 05/13/2021     o baclofen 10 mg oral tablet   SIG: take 1 tablet (10 mg) by oral  route 3 times per day   DISP: (0) Tablet with 0 refills  Discontinued on 05/13/2021     o Medications have been Reconciled  o Transition of Care or Provider Policy  · Instructions  o Depression Screen completed and scanned into the EMR under the designated folder within the patient's documents.  o Discussed the need for therapy, either with a certified counselor, psychologist, and/or family . If no improvement is noted or worsening of their condition, return to office or ER. But also discussed with patient that if they are non-responsive to the type of medication they may need to see a psychiatrist for further evaluation and management.  o Advised that cheeses and other sources of dairy fats, animal fats, fast food, and the extras (candy, pastries, pies, doughnuts and cookies) all contain LDL raising nutrients. Advised to increase fruits, vegetables, whole grains, and to monitor portion sizes.   o *Form of nicotine being used:   o Patient was strongly encouraged to discontinue use of any nicotine containing product or minimize the use of the product.  o Take all medications as prescribed/directed.  o Patient was educated/instructed on their diagnosis, treatment and medications prior to discharge from the clinic today.  o Patient instructed to seek medical attention urgently for new or worsening symptoms.  o Call the office with any concerns or questions.  o Chronic conditions reviewed and taken into consideration for today's treatment plan.  · Disposition  o Call or Return if symptoms worsen or persist.  o Follow Up PRN  o Follow Up in 2 months  o Follow Up in 6 months     pt given phone # for GI to call and sched her f/u             Electronically Signed by: BROOKLYN Bowen -Author on May 26, 2021 03:29:04 PM

## 2021-06-22 RX ORDER — METHYLPREDNISOLONE 4 MG/1
TABLET ORAL
Qty: 21 EACH | OUTPATIENT
Start: 2021-06-22

## 2021-07-15 VITALS
SYSTOLIC BLOOD PRESSURE: 124 MMHG | OXYGEN SATURATION: 96 % | HEIGHT: 63 IN | DIASTOLIC BLOOD PRESSURE: 73 MMHG | RESPIRATION RATE: 18 BRPM | BODY MASS INDEX: 16.7 KG/M2 | HEART RATE: 62 BPM | WEIGHT: 94.25 LBS

## 2021-08-04 RX ORDER — ONDANSETRON 8 MG/1
TABLET, ORALLY DISINTEGRATING ORAL
Qty: 20 TABLET | Refills: 0 | Status: SHIPPED | OUTPATIENT
Start: 2021-08-04 | End: 2022-05-03 | Stop reason: SDUPTHER

## 2021-08-17 ENCOUNTER — TELEMEDICINE (OUTPATIENT)
Dept: FAMILY MEDICINE CLINIC | Facility: CLINIC | Age: 61
End: 2021-08-17

## 2021-08-17 DIAGNOSIS — I10 ESSENTIAL HYPERTENSION: ICD-10-CM

## 2021-08-17 DIAGNOSIS — E78.2 MIXED HYPERLIPIDEMIA: ICD-10-CM

## 2021-08-17 DIAGNOSIS — R19.7 DIARRHEA, UNSPECIFIED TYPE: ICD-10-CM

## 2021-08-17 DIAGNOSIS — Z86.79 HISTORY OF ANEURYSM: ICD-10-CM

## 2021-08-17 DIAGNOSIS — F41.9 ANXIETY: Primary | ICD-10-CM

## 2021-08-17 DIAGNOSIS — F17.200 SMOKER: ICD-10-CM

## 2021-08-17 DIAGNOSIS — J44.9 CHRONIC OBSTRUCTIVE PULMONARY DISEASE, UNSPECIFIED COPD TYPE (HCC): ICD-10-CM

## 2021-08-17 PROCEDURE — 99214 OFFICE O/P EST MOD 30 MIN: CPT | Performed by: NURSE PRACTITIONER

## 2021-08-17 RX ORDER — PROPRANOLOL HYDROCHLORIDE 80 MG/1
80 TABLET ORAL DAILY
Qty: 90 TABLET | Refills: 1 | Status: SHIPPED | OUTPATIENT
Start: 2021-08-17 | End: 2022-01-18 | Stop reason: SDUPTHER

## 2021-08-17 RX ORDER — AMLODIPINE BESYLATE 2.5 MG/1
2.5 TABLET ORAL DAILY
Qty: 90 TABLET | Refills: 1 | Status: SHIPPED | OUTPATIENT
Start: 2021-08-17 | End: 2022-05-02 | Stop reason: SDUPTHER

## 2021-08-17 RX ORDER — ROSUVASTATIN CALCIUM 5 MG/1
5 TABLET, COATED ORAL DAILY
COMMUNITY
Start: 2021-08-11 | End: 2021-08-17 | Stop reason: SDUPTHER

## 2021-08-17 RX ORDER — IBUPROFEN 800 MG/1
800 TABLET ORAL DAILY
COMMUNITY
End: 2022-05-03

## 2021-08-17 RX ORDER — BACLOFEN 10 MG/1
10 TABLET ORAL DAILY PRN
COMMUNITY
End: 2022-08-03

## 2021-08-17 RX ORDER — METHOCARBAMOL 750 MG/1
750 TABLET, FILM COATED ORAL 3 TIMES DAILY
COMMUNITY
End: 2021-09-30 | Stop reason: SDUPTHER

## 2021-08-17 RX ORDER — LOSARTAN POTASSIUM AND HYDROCHLOROTHIAZIDE 12.5; 5 MG/1; MG/1
1 TABLET ORAL DAILY
COMMUNITY
End: 2021-08-17 | Stop reason: SDUPTHER

## 2021-08-17 RX ORDER — AMLODIPINE BESYLATE 2.5 MG/1
2.5 TABLET ORAL DAILY
COMMUNITY
Start: 2021-08-11 | End: 2021-08-17 | Stop reason: SDUPTHER

## 2021-08-17 RX ORDER — BUSPIRONE HYDROCHLORIDE 10 MG/1
10 TABLET ORAL 2 TIMES DAILY PRN
COMMUNITY
Start: 2021-08-11 | End: 2021-11-09

## 2021-08-17 RX ORDER — CITALOPRAM 40 MG/1
40 TABLET ORAL DAILY
COMMUNITY
Start: 2021-08-11 | End: 2022-02-01

## 2021-08-17 RX ORDER — ALBUTEROL SULFATE 1.25 MG/3ML
1 SOLUTION RESPIRATORY (INHALATION) 3 TIMES DAILY PRN
Qty: 1 EACH | Refills: 2 | Status: SHIPPED | OUTPATIENT
Start: 2021-08-17 | End: 2022-05-03

## 2021-08-17 RX ORDER — LOSARTAN POTASSIUM AND HYDROCHLOROTHIAZIDE 12.5; 5 MG/1; MG/1
1 TABLET ORAL DAILY
Qty: 90 TABLET | Refills: 1 | Status: SHIPPED | OUTPATIENT
Start: 2021-08-17 | End: 2021-11-15

## 2021-08-17 RX ORDER — ATORVASTATIN CALCIUM 40 MG/1
40 TABLET, FILM COATED ORAL DAILY
COMMUNITY
End: 2021-08-17

## 2021-08-17 RX ORDER — OMEPRAZOLE 20 MG/1
20 CAPSULE, DELAYED RELEASE ORAL DAILY
COMMUNITY
End: 2022-05-03 | Stop reason: SDUPTHER

## 2021-08-17 RX ORDER — ALBUTEROL SULFATE 90 UG/1
2 AEROSOL, METERED RESPIRATORY (INHALATION)
COMMUNITY
End: 2021-11-09

## 2021-08-17 RX ORDER — TRAZODONE HYDROCHLORIDE 100 MG/1
100 TABLET ORAL DAILY
COMMUNITY
End: 2022-09-06 | Stop reason: SDUPTHER

## 2021-08-17 RX ORDER — ROSUVASTATIN CALCIUM 5 MG/1
5 TABLET, COATED ORAL DAILY
Qty: 90 TABLET | Refills: 1 | Status: SHIPPED | OUTPATIENT
Start: 2021-08-17 | End: 2022-05-02 | Stop reason: SDUPTHER

## 2021-08-17 RX ORDER — PROPRANOLOL HYDROCHLORIDE 80 MG/1
80 TABLET ORAL DAILY
COMMUNITY
Start: 2021-08-11 | End: 2021-08-17 | Stop reason: SDUPTHER

## 2021-08-17 RX ORDER — ASPIRIN 325 MG
325 TABLET ORAL DAILY
COMMUNITY
End: 2022-05-03

## 2021-08-17 NOTE — ASSESSMENT & PLAN NOTE
Not well controlled at present per patient report, she requests referral to psychiatry for further management and potential counseling.  Referral made.

## 2021-08-17 NOTE — PROGRESS NOTES
You have chosen to receive care through a telehealth visit.  Do you consent to use a video/audio connection for your medical care today? YES    Chief Complaint  HTN, HL, f/u med RFs'   Subjective          Cindy Bella presents to [facility] for   History of Present Illness    Patient is here for a follow up on HTN, HLD.  States doing well on all of her medications.  Is unsure she needs refills at present, thinks she may on her blood pressure medication, states that she will have the pharmacy send request if she needs anything else.    Patient is requesting a cxr, was told she has a hx of an aneurysm. States was sent for an US many years ago.    Pt smoking still, down to 1/2 ppd. States anxiety makes it hard to quit, wants to see a psych provider for some counseling, thinks getting the anxiety under better control will allow her to finally quit.    Patient states she is still having a lot of diarrhea, states weight has stabilized at 94 pounds but that she has not heard anything from GI since her colonoscopy.  Would like to go back and see them.    Patient requests refill of nebulizer machine medication.  States she uses on a as needed basis.  Patient also states she needs a new nebulizer tubing.    Medical History  Past Medical History:   Diagnosis Date   • Anxiety    • Arthritis    • Brain neoplasm malignant (CMS/HCC)    • Depression    • Scoliosis    • SVT (supraventricular tachycardia) (CMS/HCC)    • Urethral stricture 07/23/2015     Surgical History  History reviewed. No pertinent surgical history.  Social History  Social History     Socioeconomic History   • Marital status:      Spouse name: Not on file   • Number of children: Not on file   • Years of education: Not on file   • Highest education level: Not on file   Tobacco Use   • Smoking status: Current Every Day Smoker   • Smokeless tobacco: Never Used   • Tobacco comment: 3/4 ppd   Vaping Use   • Vaping Use:  Never used   Substance and Sexual Activity   • Alcohol use: Not Currently   • Drug use: Yes     Types: Marijuana   • Sexual activity: Defer       Current Outpatient Medications:   •  albuterol sulfate  (90 Base) MCG/ACT inhaler, Inhale 2 puffs., Disp: , Rfl:   •  amLODIPine (NORVASC) 2.5 MG tablet, Take 1 tablet by mouth Daily., Disp: 90 tablet, Rfl: 1  •  aspirin 325 MG tablet, Take 325 mg by mouth Daily., Disp: , Rfl:   •  baclofen (LIORESAL) 10 MG tablet, Take 10 mg by mouth Daily As Needed., Disp: , Rfl:   •  busPIRone (BUSPAR) 10 MG tablet, Take 10 mg by mouth 2 (Two) Times a Day As Needed., Disp: , Rfl:   •  citalopram (CeleXA) 40 MG tablet, Take 40 mg by mouth Daily., Disp: , Rfl:   •  ibuprofen (ADVIL,MOTRIN) 800 MG tablet, Take 800 mg by mouth Daily., Disp: , Rfl:   •  losartan-hydrochlorothiazide (HYZAAR) 50-12.5 MG per tablet, Take 1 tablet by mouth Daily., Disp: 90 tablet, Rfl: 1  •  methocarbamol (ROBAXIN) 750 MG tablet, Take 750 mg by mouth 3 (Three) Times a Day., Disp: , Rfl:   •  omeprazole (priLOSEC) 20 MG capsule, Take 20 mg by mouth Daily., Disp: , Rfl:   •  ondansetron ODT (ZOFRAN-ODT) 8 MG disintegrating tablet, DISSOLVE 1 TABLET ON THE TONGUE EVERY 8 HOURS AS NEEDED FOR NAUSEA, Disp: 20 tablet, Rfl: 0  •  propranolol (INDERAL) 80 MG tablet, Take 1 tablet by mouth Daily., Disp: 90 tablet, Rfl: 1  •  rosuvastatin (CRESTOR) 5 MG tablet, Take 1 tablet by mouth Daily., Disp: 90 tablet, Rfl: 1  •  Tiotropium Bromide Monohydrate (SPIRIVA RESPIMAT) 1.25 MCG/ACT aerosol solution inhaler, Inhale 2 puffs Daily., Disp: , Rfl:   •  traZODone (DESYREL) 100 MG tablet, Take 100 mg by mouth Daily., Disp: , Rfl:   •  albuterol (ACCUNEB) 1.25 MG/3ML nebulizer solution, Take 3 mL by nebulization 3 (Three) Times a Day As Needed for Wheezing or Shortness of Air., Disp: 1 each, Rfl: 2    Review of Systems     Objective     There were no vitals taken for this visit.    There is no height or weight on file to  calculate BMI.    Physical Exam  Vitals reviewed.   Constitutional:       Appearance: Normal appearance. She is well-developed.   HENT:      Head: Normocephalic and atraumatic.      Right Ear: External ear normal.      Left Ear: External ear normal.   Eyes:      Conjunctiva/sclera: Conjunctivae normal.   Pulmonary:      Effort: Pulmonary effort is normal.   Neurological:      Mental Status: She is alert and oriented to person, place, and time.   Psychiatric:         Mood and Affect: Mood and affect normal.         Behavior: Behavior normal.         Thought Content: Thought content normal.         Judgment: Judgment normal.         Result Review :     The following data was reviewed by: BROOKLYN Boewn on 08/17/2021:    CMP    CMP 9/2/20 9/14/20   Glucose 104 (A) 70   BUN 14 10   Creatinine 0.81 0.75   Sodium 139 139   Potassium 3.2 (A) 3.8   Chloride 98 (A) 100   Calcium 11.2 (A) 10.4   Albumin 4.5 4.4   Total Bilirubin 0.60 0.50   Alkaline Phosphatase 94 82   AST (SGOT) 20 20   ALT (SGPT) 11 11   (A) Abnormal value            CBC    CBC 9/2/20 9/14/20   WBC 11.79 (A) 8.37   RBC 4.93 4.70   Hemoglobin 15.7 15.0   Hematocrit 46.8 46.2   MCV 94.9 98.3   MCH 31.8 (A) 31.9 (A)   MCHC 33.5 32.5 (A)   RDW 13.0 13.4   Platelets 268 269   (A) Abnormal value            Lipid Panel    Lipid Panel 9/14/20   Total Cholesterol 244 (A)   Triglycerides 171 (A)   HDL Cholesterol 56   VLDL Cholesterol 34   LDL Cholesterol  154 (A)   (A) Abnormal value       Comments are available for some flowsheets but are not being displayed.           TSH    TSH 9/14/20   TSH 0.859                              Assessment:  Diagnoses and all orders for this visit:    1. Anxiety (Primary)  Assessment & Plan:  Not well controlled at present per patient report, she requests referral to psychiatry for further management and potential counseling.  Referral made.    Orders:  -     Ambulatory Referral to Psychiatry    2. Diarrhea, unspecified  type  -     Ambulatory Referral to Gastroenterology    3. History of aneurysm  -     US AAA Screen Limited; Future    4. Essential hypertension  Assessment & Plan:  Hypertension is well controlled per patient report, patient being seen via telehealth unable to assess blood pressure in office today.  Patient is to continue current dose of medication and continue monitoring blood pressure at home.    Orders:  -     propranolol (INDERAL) 80 MG tablet; Take 1 tablet by mouth Daily.  Dispense: 90 tablet; Refill: 1  -     losartan-hydrochlorothiazide (HYZAAR) 50-12.5 MG per tablet; Take 1 tablet by mouth Daily.  Dispense: 90 tablet; Refill: 1  -     amLODIPine (NORVASC) 2.5 MG tablet; Take 1 tablet by mouth Daily.  Dispense: 90 tablet; Refill: 1  -     Comprehensive Metabolic Panel  -     Lipid Panel    5. Mixed hyperlipidemia  Assessment & Plan:  Lipids poorly controlled at last check, patient due for lab recheck.  Continue Crestor labs ordered today.    Orders:  -     rosuvastatin (CRESTOR) 5 MG tablet; Take 1 tablet by mouth Daily.  Dispense: 90 tablet; Refill: 1  -     Lipid Panel    6. Chronic obstructive pulmonary disease, unspecified COPD type (CMS/[facility])  Assessment & Plan:  COPD is stable, patient working on smoking cessation, continue current medications and refill of nebulizer treatments sent.  Encourage patient to continue to work on smoking cessation.        Orders:  -     albuterol (ACCUNEB) 1.25 MG/3ML nebulizer solution; Take 3 mL by nebulization 3 (Three) Times a Day As Needed for Wheezing or Shortness of Air.  Dispense: 1 each; Refill: 2    7. Smoker    Cindy Bella  reports that she has been smoking. She has never used smokeless tobacco.. I have educated her on the risk of diseases from using tobacco products such as cancer, COPD and heart disease.     I advised her to quit and she is not willing to quit.  Patient states she is not willing to quit cold turkey today, however she is still working on  cutting down.    I spent 3  minutes counseling the patient.               Follow Up     No follow-ups on file.    Patient was given instructions and counseling regarding her condition or for health maintenance advice. Please see specific information pulled into the AVS if appropriate.     Tina Hartley, APRN  08/17/2021

## 2021-08-17 NOTE — ASSESSMENT & PLAN NOTE
COPD is stable, patient working on smoking cessation, continue current medications and refill of nebulizer treatments sent.  Encourage patient to continue to work on smoking cessation.

## 2021-08-17 NOTE — ASSESSMENT & PLAN NOTE
Lipids poorly controlled at last check, patient due for lab recheck.  Continue Crestor labs ordered today.

## 2021-08-17 NOTE — ASSESSMENT & PLAN NOTE
Hypertension is well controlled per patient report, patient being seen via telehealth unable to assess blood pressure in office today.  Patient is to continue current dose of medication and continue monitoring blood pressure at home.   DR Chavarria #[id number]

## 2021-09-03 ENCOUNTER — HOSPITAL ENCOUNTER (OUTPATIENT)
Dept: ULTRASOUND IMAGING | Facility: HOSPITAL | Age: 61
Discharge: HOME | End: 2021-09-03
Admitting: NURSE PRACTITIONER

## 2021-09-03 DIAGNOSIS — Z86.79 HISTORY OF ANEURYSM: ICD-10-CM

## 2021-09-03 PROCEDURE — 76706 US ABDL AORTA SCREEN AAA: CPT

## 2021-09-30 DIAGNOSIS — G89.29 OTHER CHRONIC PAIN: Primary | ICD-10-CM

## 2021-09-30 RX ORDER — METHOCARBAMOL 750 MG/1
750 TABLET, FILM COATED ORAL 3 TIMES DAILY
Qty: 270 TABLET | Refills: 1 | Status: SHIPPED | OUTPATIENT
Start: 2021-09-30 | End: 2022-05-02 | Stop reason: SDUPTHER

## 2021-10-27 ENCOUNTER — TELEPHONE (OUTPATIENT)
Dept: FAMILY MEDICINE CLINIC | Facility: CLINIC | Age: 61
End: 2021-10-27

## 2021-10-27 NOTE — TELEPHONE ENCOUNTER
----- Message from BROOKLYN Stewart sent at 10/27/2021  1:58 PM EDT -----  Recommend patient reschedule  ----- Message -----  From: Leidy Madera  Sent: 10/27/2021   1:56 PM EDT  To: BROOKLYN Stewart    Patient no showed mammogram on 9/10/21  ----- Message -----  From: SYSTEM  Sent: 9/15/2021   1:14 AM EDT  To: [facility] Clinical Pool

## 2021-10-27 NOTE — TELEPHONE ENCOUNTER
Phoned patient left a message that Tina recommends her to get her mammogram. I left the number so the patient can call and schedule the appointment.

## 2021-11-09 RX ORDER — ALBUTEROL SULFATE 90 UG/1
AEROSOL, METERED RESPIRATORY (INHALATION)
Qty: 54 G | Refills: 0 | Status: SHIPPED | OUTPATIENT
Start: 2021-11-09 | End: 2021-12-06

## 2021-11-09 RX ORDER — BUSPIRONE HYDROCHLORIDE 10 MG/1
TABLET ORAL
Qty: 180 TABLET | Refills: 0 | Status: SHIPPED | OUTPATIENT
Start: 2021-11-09 | End: 2022-05-02 | Stop reason: SDUPTHER

## 2021-11-11 PROBLEM — C71.9 MALIGNANT NEOPLASM OF BRAIN: Status: ACTIVE | Noted: 2021-11-11

## 2021-11-11 PROBLEM — M41.20 IDIOPATHIC SCOLIOSIS AND KYPHOSCOLIOSIS: Status: ACTIVE | Noted: 2017-07-10

## 2021-11-11 PROBLEM — I47.1 SVT (SUPRAVENTRICULAR TACHYCARDIA): Status: ACTIVE | Noted: 2021-11-11

## 2021-11-11 PROBLEM — F32.A DEPRESSION: Status: ACTIVE | Noted: 2021-11-11

## 2021-11-11 PROBLEM — M19.90 ARTHRITIS: Status: ACTIVE | Noted: 2021-11-11

## 2021-11-11 PROBLEM — M41.9 SCOLIOSIS: Status: ACTIVE | Noted: 2021-11-11

## 2021-11-11 PROBLEM — I47.10 SVT (SUPRAVENTRICULAR TACHYCARDIA): Status: ACTIVE | Noted: 2021-11-11

## 2021-11-15 DIAGNOSIS — I10 ESSENTIAL HYPERTENSION: ICD-10-CM

## 2021-11-15 RX ORDER — LOSARTAN POTASSIUM AND HYDROCHLOROTHIAZIDE 12.5; 5 MG/1; MG/1
TABLET ORAL
Qty: 180 TABLET | Refills: 0 | Status: SHIPPED | OUTPATIENT
Start: 2021-11-15 | End: 2022-05-02 | Stop reason: SDUPTHER

## 2021-12-06 RX ORDER — ALBUTEROL SULFATE 90 UG/1
AEROSOL, METERED RESPIRATORY (INHALATION)
Qty: 54 G | Refills: 0 | Status: SHIPPED | OUTPATIENT
Start: 2021-12-06 | End: 2022-08-02 | Stop reason: SDUPTHER

## 2022-01-18 DIAGNOSIS — I10 ESSENTIAL HYPERTENSION: ICD-10-CM

## 2022-01-18 NOTE — TELEPHONE ENCOUNTER
Caller: Cindy Bella    Relationship: Self    Best call back number: [phone]    Requested Prescriptions:   Requested Prescriptions      No prescriptions requested or ordered in this encounter      propranolol 80 mg oral tablet 05/13/2021 take 1 tablet (80 mg) by oral route 2 times per day for 90 days for 90 days         [facility] where [facility] be sent[id number] - [place], [place] - [address] AT [facility] [phone] [facility] [phone]      Additional details provided by patient: PATIENT IS OUT OF MEDICATION ; PATIENT REPORTS SHE HAS ALWAYS TAKEN MEDICATION TWICE DAILY; IT WAS SENT IN INCORRECTLY LAST TIME, SO SHE HAS RAN OUT DUE TO TAKING IT TWICE A DAY     Does the patient have less than a 3 day supply:  [x] Yes  [] No    Tiffanie Rosales Rep   01/18/22 14:16 EST

## 2022-01-18 NOTE — TELEPHONE ENCOUNTER
Patient says she takes this twice daily. I have the medicine ordered but did not want to send without your okay.     Thanks!

## 2022-01-19 RX ORDER — PROPRANOLOL HYDROCHLORIDE 80 MG/1
80 TABLET ORAL 2 TIMES DAILY
Qty: 180 TABLET | Refills: 1 | Status: SHIPPED | OUTPATIENT
Start: 2022-01-19 | End: 2022-05-02 | Stop reason: SDUPTHER

## 2022-02-01 RX ORDER — CITALOPRAM 40 MG/1
TABLET ORAL
Qty: 90 TABLET | Refills: 0 | Status: SHIPPED | OUTPATIENT
Start: 2022-02-01 | End: 2022-05-02 | Stop reason: SDUPTHER

## 2022-02-09 ENCOUNTER — TELEPHONE (OUTPATIENT)
Dept: FAMILY MEDICINE CLINIC | Facility: CLINIC | Age: 62
End: 2022-02-09

## 2022-02-09 NOTE — TELEPHONE ENCOUNTER
Caller: Cindy Bella    Relationship: Self    Best call back number: [phone]    What orders are you requesting (i.e. lab or imaging): 3 WAY WALKER, UPPER BACK TRACTION    In what timeframe would the patient need to come in: WHEN POSSIBLE    Additional notes: PATIENT IS FEELING UNSTABLE AND WOULD LIKE TO GET A 3 WAY WALKER SO SHE CAN GO BACK TO MOVING AND GETTING OUT. SHE WOULD ALSO LIKE TO GET UPPER BACK TRACTION.

## 2022-02-10 NOTE — TELEPHONE ENCOUNTER
Pt is going to need an appointment for these DME will need to do exam and have progress note with findings for insurance to cover

## 2022-04-26 ENCOUNTER — TELEPHONE (OUTPATIENT)
Dept: FAMILY MEDICINE CLINIC | Facility: CLINIC | Age: 62
End: 2022-04-26

## 2022-04-26 NOTE — TELEPHONE ENCOUNTER
Caller: Cindy Bella    Relationship: Self    Best call back number: [phone]    Requested Prescriptions: propranolol (INDERAL) 80 MG tablet  Requested Prescriptions      No prescriptions requested or ordered in this encounter        Pharmacy where request should be sent:      [id number] - [place], [place] - [address] AT [facility] [phone] [facility] [phone]   [phone]    Additional details provided by patient: HAS 1 DAY LEFT    Does the patient have less than a 3 day supply:  [x] Yes  [] No    Tiffanie Chou Rep   04/26/22 09:38 EDT     PATIENT HAS UPCOMING APPOINTMENT ON 05/03/22 WITH JAN. PLEASE CALL PATIENT ONCE SENT TO PHARMACY

## 2022-04-28 DIAGNOSIS — I10 ESSENTIAL HYPERTENSION: ICD-10-CM

## 2022-05-02 RX ORDER — CITALOPRAM 40 MG/1
TABLET ORAL
Qty: 90 TABLET | Refills: 0 | OUTPATIENT
Start: 2022-05-02

## 2022-05-02 RX ORDER — LOSARTAN POTASSIUM AND HYDROCHLOROTHIAZIDE 12.5; 5 MG/1; MG/1
TABLET ORAL
Qty: 180 TABLET | Refills: 0 | OUTPATIENT
Start: 2022-05-02

## 2022-05-03 ENCOUNTER — TELEPHONE (OUTPATIENT)
Dept: FAMILY MEDICINE CLINIC | Facility: CLINIC | Age: 62
End: 2022-05-03

## 2022-05-03 ENCOUNTER — HOSPITAL ENCOUNTER (OUTPATIENT)
Dept: GENERAL RADIOLOGY | Facility: HOSPITAL | Age: 62
Discharge: HOME | End: 2022-05-03
Admitting: NURSE PRACTITIONER

## 2022-05-03 ENCOUNTER — OFFICE VISIT (OUTPATIENT)
Dept: FAMILY MEDICINE CLINIC | Facility: CLINIC | Age: 62
End: 2022-05-03

## 2022-05-03 VITALS
BODY MASS INDEX: 17.12 KG/M2 | SYSTOLIC BLOOD PRESSURE: 124 MMHG | OXYGEN SATURATION: 96 % | DIASTOLIC BLOOD PRESSURE: 82 MMHG | WEIGHT: 96.6 LBS | HEART RATE: 93 BPM | HEIGHT: 63 IN

## 2022-05-03 DIAGNOSIS — Z87.891 HISTORY OF SMOKING: ICD-10-CM

## 2022-05-03 DIAGNOSIS — F41.9 ANXIETY: ICD-10-CM

## 2022-05-03 DIAGNOSIS — I10 ESSENTIAL HYPERTENSION: ICD-10-CM

## 2022-05-03 DIAGNOSIS — K21.9 GASTROESOPHAGEAL REFLUX DISEASE, UNSPECIFIED WHETHER ESOPHAGITIS PRESENT: ICD-10-CM

## 2022-05-03 DIAGNOSIS — Z11.59 NEED FOR HEPATITIS C SCREENING TEST: ICD-10-CM

## 2022-05-03 DIAGNOSIS — L98.9 SKIN LESION OF BACK: ICD-10-CM

## 2022-05-03 DIAGNOSIS — G89.29 CHRONIC LOW BACK PAIN WITHOUT SCIATICA, UNSPECIFIED BACK PAIN LATERALITY: ICD-10-CM

## 2022-05-03 DIAGNOSIS — G89.29 OTHER CHRONIC PAIN: ICD-10-CM

## 2022-05-03 DIAGNOSIS — R06.02 SOB (SHORTNESS OF BREATH): ICD-10-CM

## 2022-05-03 DIAGNOSIS — Z13.29 SCREENING FOR THYROID DISORDER: Primary | ICD-10-CM

## 2022-05-03 DIAGNOSIS — M54.50 CHRONIC LOW BACK PAIN WITHOUT SCIATICA, UNSPECIFIED BACK PAIN LATERALITY: ICD-10-CM

## 2022-05-03 DIAGNOSIS — M41.9 SCOLIOSIS, UNSPECIFIED SCOLIOSIS TYPE, UNSPECIFIED SPINAL REGION: ICD-10-CM

## 2022-05-03 DIAGNOSIS — E78.2 MIXED HYPERLIPIDEMIA: ICD-10-CM

## 2022-05-03 PROCEDURE — 99214 OFFICE O/P EST MOD 30 MIN: CPT | Performed by: NURSE PRACTITIONER

## 2022-05-03 PROCEDURE — 71046 X-RAY EXAM CHEST 2 VIEWS: CPT

## 2022-05-03 RX ORDER — TIZANIDINE HYDROCHLORIDE 4 MG/1
4 CAPSULE, GELATIN COATED ORAL 3 TIMES DAILY PRN
Qty: 30 CAPSULE | Refills: 0 | Status: SHIPPED | OUTPATIENT
Start: 2022-05-03 | End: 2022-08-02 | Stop reason: SDUPTHER

## 2022-05-03 RX ORDER — ONDANSETRON 8 MG/1
8 TABLET, ORALLY DISINTEGRATING ORAL EVERY 8 HOURS PRN
Qty: 20 TABLET | Refills: 0 | Status: SHIPPED | OUTPATIENT
Start: 2022-05-03 | End: 2022-08-02 | Stop reason: SDUPTHER

## 2022-05-03 RX ORDER — OMEPRAZOLE 20 MG/1
40 CAPSULE, DELAYED RELEASE ORAL DAILY
Qty: 90 CAPSULE | Refills: 1 | Status: SHIPPED | OUTPATIENT
Start: 2022-05-03 | End: 2022-08-01

## 2022-05-03 RX ORDER — AMLODIPINE BESYLATE 2.5 MG/1
2.5 TABLET ORAL DAILY
Qty: 90 TABLET | Refills: 1 | Status: SHIPPED | OUTPATIENT
Start: 2022-05-03 | End: 2022-09-06 | Stop reason: SDUPTHER

## 2022-05-03 RX ORDER — LOSARTAN POTASSIUM AND HYDROCHLOROTHIAZIDE 12.5; 5 MG/1; MG/1
2 TABLET ORAL DAILY
Qty: 180 TABLET | Refills: 1 | Status: SHIPPED | OUTPATIENT
Start: 2022-05-03 | End: 2022-09-06 | Stop reason: SDUPTHER

## 2022-05-03 RX ORDER — BUSPIRONE HYDROCHLORIDE 10 MG/1
10 TABLET ORAL 2 TIMES DAILY
Qty: 180 TABLET | Refills: 1 | Status: SHIPPED | OUTPATIENT
Start: 2022-05-03 | End: 2022-09-06 | Stop reason: SDUPTHER

## 2022-05-03 RX ORDER — PROPRANOLOL HYDROCHLORIDE 80 MG/1
80 TABLET ORAL 2 TIMES DAILY
Qty: 180 TABLET | Refills: 1 | Status: SHIPPED | OUTPATIENT
Start: 2022-05-03 | End: 2022-09-06 | Stop reason: SDUPTHER

## 2022-05-03 RX ORDER — METHOCARBAMOL 750 MG/1
750 TABLET, FILM COATED ORAL 3 TIMES DAILY
Qty: 270 TABLET | Refills: 1 | Status: SHIPPED | OUTPATIENT
Start: 2022-05-03 | End: 2022-09-06

## 2022-05-03 RX ORDER — IPRATROPIUM BROMIDE AND ALBUTEROL SULFATE 2.5; .5 MG/3ML; MG/3ML
3 SOLUTION RESPIRATORY (INHALATION) 3 TIMES DAILY PRN
Qty: 360 ML | Refills: 1 | Status: SHIPPED | OUTPATIENT
Start: 2022-05-03 | End: 2022-08-04

## 2022-05-03 RX ORDER — ASPIRIN 81 MG/1
81 TABLET ORAL DAILY
COMMUNITY
End: 2022-09-06 | Stop reason: SDUPTHER

## 2022-05-03 RX ORDER — CITALOPRAM 40 MG/1
40 TABLET ORAL DAILY
Qty: 90 TABLET | Refills: 1 | Status: SHIPPED | OUTPATIENT
Start: 2022-05-03 | End: 2022-09-06 | Stop reason: SDUPTHER

## 2022-05-03 RX ORDER — ROSUVASTATIN CALCIUM 5 MG/1
5 TABLET, COATED ORAL DAILY
Qty: 90 TABLET | Refills: 1 | Status: SHIPPED | OUTPATIENT
Start: 2022-05-03 | End: 2022-07-29

## 2022-05-03 NOTE — TELEPHONE ENCOUNTER
Caller: Cindy Bella A    Relationship to patient: Self    Best call back number: [phone]    Patient is needing: PATIENT CALLED STATING SHE WENT TO THE PHARMACY TO  HER MEDICATIONS AND THEY STATED SHE CANNOT TAKE TiZANidine (ZANAFLEX) 4 MG capsule WITH methocarbamol (ROBAXIN) 750 MG tablet . THE PATIENT STATED SHE WOULD LIKE A CALL BACK TO SPEAK WITH THE NURSE REGARDING THIS PLEASE ADVISE THANK YOU.

## 2022-05-03 NOTE — PROGRESS NOTES
Chief Complaint  Follow-up hypertension, reflux, hyperlipidemia, insomnia  Subjective          Cindy Bella presents to [facility] for   History of Present Illness  Patient presents today to follow-up for hypertension (Amlodipine, Losartan-HCTZ, Inderal. Patient does not check BP at home. Denies chest pain. Does have slight headache and LLE at times mildly.), Anxiety (Buspar, Celexa), Heartburn (Omeprazole, would like this increased. She is currently taking 20mg once per day), Hyperlipidemia (Rosuvastatin), Insomnia (Trazodone), Shortness of Breath (Patient says she is very SOB at times and says she may benefit from nebulizer), and Nausea (Zofran, patient says she is out of this medication.)  Patient complaining of significant low back pain middle back and upper back pain.  Patient has history of severe scoliosis, patient is interested in doing traction and physical therapy.  Patient states that some days the pain really wears her down, is adamant that she does not want pain medication, but she is interested in other modalities of pain management.  Patient became very tearful in discussing her level of pain.  Patient states that she pushes to remain very active because she believes that if she stops being active she will lose the ability to do anything  Medical History  Past Medical History:   Diagnosis Date   • Anxiety    • Arthritis    • Brain neoplasm malignant (HCC)    • Depression    • Fibromyalgia, primary    • HL (hearing loss)    • Hyperlipidemia    • Hypertension    • Irritable bowel syndrome    • Low back pain    • Scoliosis    • SVT (supraventricular tachycardia) (HCC)    • Urethral stricture 2015     Surgical History  Past Surgical History:   Procedure Laterality Date   • APPENDECTOMY     • BREAST SURGERY     • CARDIAC SURGERY     •  SECTION       Social History  Social History     Socioeconomic History   • Marital status:    Tobacco Use   •  Smoking status: Current Every Day Smoker     Packs/day: 1.00     Years: 30.00     Pack years: 30.00     Types: Cigarettes     Start date: 1974     Last attempt to quit: 2021     Years since quittin.4   • Smokeless tobacco: Never Used   • Tobacco comment: 3/ ppd   Vaping Use   • Vaping Use: Never used   Substance and Sexual Activity   • Alcohol use: Never   • Drug use: Yes     Types: Marijuana   • Sexual activity: Not Currently     Partners: Male     Birth control/protection: None       Current Outpatient Medications:   •  albuterol sulfate  (90 Base) MCG/ACT inhaler, INHALE 1 PUFF BY MOUTH EVERY 6 HOURS AS NEEDED, Disp: 54 g, Rfl: 0  •  amLODIPine (NORVASC) 2.5 MG tablet, Take 1 tablet by mouth Daily., Disp: 90 tablet, Rfl: 1  •  aspirin (aspirin) 81 MG EC tablet, Take 81 mg by mouth Daily., Disp: , Rfl:   •  baclofen (LIORESAL) 10 MG tablet, Take 10 mg by mouth Daily As Needed., Disp: , Rfl:   •  busPIRone (BUSPAR) 10 MG tablet, Take 1 tablet by mouth 2 (Two) Times a Day., Disp: 180 tablet, Rfl: 1  •  citalopram (CeleXA) 40 MG tablet, Take 1 tablet by mouth Daily., Disp: 90 tablet, Rfl: 1  •  losartan-hydrochlorothiazide (HYZAAR) 50-12.5 MG per tablet, Take 2 tablets by mouth Daily., Disp: 180 tablet, Rfl: 1  •  methocarbamol (ROBAXIN) 750 MG tablet, Take 1 tablet by mouth 3 (Three) Times a Day., Disp: 270 tablet, Rfl: 1  •  omeprazole (priLOSEC) 20 MG capsule, Take 2 capsules by mouth Daily., Disp: 90 capsule, Rfl: 1  •  ondansetron ODT (ZOFRAN-ODT) 8 MG disintegrating tablet, Place 1 tablet on the tongue Every 8 (Eight) Hours As Needed for Nausea or Vomiting., Disp: 20 tablet, Rfl: 0  •  propranolol (INDERAL) 80 MG tablet, Take 1 tablet by mouth 2 (Two) Times a Day., Disp: 180 tablet, Rfl: 1  •  rosuvastatin (CRESTOR) 5 MG tablet, Take 1 tablet by mouth Daily., Disp: 90 tablet, Rfl: 1  •  Tiotropium Bromide Monohydrate (SPIRIVA RESPIMAT) 1.25 MCG/ACT aerosol solution inhaler, Inhale 2 puffs  "Daily., Disp: , Rfl:   •  traZODone (DESYREL) 100 MG tablet, Take 100 mg by mouth Daily., Disp: , Rfl:   •  ipratropium-albuterol (DUO-NEB) 0.5-2.5 mg/3 ml nebulizer, Take 3 mL by nebulization 3 (Three) Times a Day As Needed for Wheezing., Disp: 360 mL, Rfl: 1  •  TiZANidine (ZANAFLEX) 4 MG capsule, Take 1 capsule by mouth 3 (Three) Times a Day As Needed for Muscle Spasms., Disp: 30 capsule, Rfl: 0    Review of Systems     Objective     /82   Pulse 93   Ht 160 cm (63\")   Wt 43.8 kg (96 lb 9.6 oz)   SpO2 96%   BMI 17.11 kg/m²     Body mass index is 17.11 kg/m².    Physical Exam  Vitals reviewed.   Constitutional:       Appearance: Normal appearance. She is well-developed.   HENT:      Head: Normocephalic and atraumatic.      Right Ear: External ear normal.      Left Ear: External ear normal.      Mouth/Throat:      Pharynx: No oropharyngeal exudate.   Eyes:      Conjunctiva/sclera: Conjunctivae normal.      Pupils: Pupils are equal, round, and reactive to light.   Cardiovascular:      Rate and Rhythm: Normal rate and regular rhythm.      Heart sounds: No murmur heard.    No friction rub. No gallop.   Pulmonary:      Effort: Pulmonary effort is normal.      Breath sounds: Normal breath sounds. No wheezing or rhonchi.   Abdominal:      General: Bowel sounds are normal. There is no distension.      Palpations: Abdomen is soft.      Tenderness: There is no abdominal tenderness.   Musculoskeletal:      Comments: Severe S shaped curvature noted to entire spine, musculature very tender to touch at present   Skin:     General: Skin is warm and dry.   Neurological:      Mental Status: She is alert and oriented to person, place, and time.      Cranial Nerves: No cranial nerve deficit.   Psychiatric:         Mood and Affect: Mood and affect normal.         Behavior: Behavior normal.         Thought Content: Thought content normal.         Judgment: Judgment normal.         Result Review :     The following data was " reviewed by: BROOKLYN Bowen on 05/03/2022:                                       Assessment:  Diagnoses and all orders for this visit:    1. Screening for thyroid disorder (Primary)  -     TSH; Future    2. Essential hypertension  -     amLODIPine (NORVASC) 2.5 MG tablet; Take 1 tablet by mouth Daily.  Dispense: 90 tablet; Refill: 1  -     losartan-hydrochlorothiazide (HYZAAR) 50-12.5 MG per tablet; Take 2 tablets by mouth Daily.  Dispense: 180 tablet; Refill: 1  -     propranolol (INDERAL) 80 MG tablet; Take 1 tablet by mouth 2 (Two) Times a Day.  Dispense: 180 tablet; Refill: 1  -     CBC w AUTO Differential; Future  -     Comprehensive metabolic panel; Future    3. Other chronic pain  -     methocarbamol (ROBAXIN) 750 MG tablet; Take 1 tablet by mouth 3 (Three) Times a Day.  Dispense: 270 tablet; Refill: 1  -     TiZANidine (ZANAFLEX) 4 MG capsule; Take 1 capsule by mouth 3 (Three) Times a Day As Needed for Muscle Spasms.  Dispense: 30 capsule; Refill: 0    4. Mixed hyperlipidemia  -     rosuvastatin (CRESTOR) 5 MG tablet; Take 1 tablet by mouth Daily.  Dispense: 90 tablet; Refill: 1  -     Lipid panel; Future    5. Need for hepatitis C screening test  -     Hepatitis C antibody; Future    6. Skin lesion of back  -     Ambulatory Referral to Dermatology    7. Scoliosis, unspecified scoliosis type, unspecified spinal region  -     Ambulatory Referral to Physical Therapy Evaluate and treat  -     Ambulatory Referral to Neurosurgery  -     TiZANidine (ZANAFLEX) 4 MG capsule; Take 1 capsule by mouth 3 (Three) Times a Day As Needed for Muscle Spasms.  Dispense: 30 capsule; Refill: 0    8. SOB (shortness of breath)  -     XR Chest 2 View; Future    9. History of smoking  -     XR Chest 2 View; Future    10. Chronic low back pain without sciatica, unspecified back pain laterality  Assessment & Plan:  Discussed trial of sometimes switching muscle relaxers can help, side effects administration of tizanidine  discussed, stressed caution in seeing how it affects her as far as drowsiness.  Patient verbalized understanding and will call back if this medication works better for additional refills    Orders:  -     Ambulatory Referral to Physical Therapy Evaluate and treat    11. Anxiety  -     busPIRone (BUSPAR) 10 MG tablet; Take 1 tablet by mouth 2 (Two) Times a Day.  Dispense: 180 tablet; Refill: 1  -     citalopram (CeleXA) 40 MG tablet; Take 1 tablet by mouth Daily.  Dispense: 90 tablet; Refill: 1    12. Gastroesophageal reflux disease, unspecified whether esophagitis present  -     ondansetron ODT (ZOFRAN-ODT) 8 MG disintegrating tablet; Place 1 tablet on the tongue Every 8 (Eight) Hours As Needed for Nausea or Vomiting.  Dispense: 20 tablet; Refill: 0  -     omeprazole (priLOSEC) 20 MG capsule; Take 2 capsules by mouth Daily.  Dispense: 90 capsule; Refill: 1    Other orders  -     ipratropium-albuterol (DUO-NEB) 0.5-2.5 mg/3 ml nebulizer; Take 3 mL by nebulization 3 (Three) Times a Day As Needed for Wheezing.  Dispense: 360 mL; Refill: 1              Follow Up     Return in about 3 months (around 8/3/2022).    Patient was given instructions and counseling regarding her condition or for health maintenance advice. Please see specific information pulled into the AVS if appropriate.     Tina Hartley, APRN  05/03/2022

## 2022-05-03 NOTE — ASSESSMENT & PLAN NOTE
Discussed trial of sometimes switching muscle relaxers can help, side effects administration of tizanidine discussed, stressed caution in seeing how it affects her as far as drowsiness.  Patient verbalized understanding and will call back if this medication works better for additional refills

## 2022-05-04 ENCOUNTER — TELEPHONE (OUTPATIENT)
Dept: FAMILY MEDICINE CLINIC | Facility: CLINIC | Age: 62
End: 2022-05-04

## 2022-05-04 DIAGNOSIS — Z12.2 SCREENING FOR LUNG CANCER: ICD-10-CM

## 2022-05-04 DIAGNOSIS — F17.200 SMOKER: Primary | ICD-10-CM

## 2022-05-04 DIAGNOSIS — Z87.891 PERSONAL HISTORY OF NICOTINE DEPENDENCE: ICD-10-CM

## 2022-05-04 NOTE — TELEPHONE ENCOUNTER
Now it does not need to be changed, patient is going to try this instead of the Robaxin and if it works better we will make a permanent switch.  We discussed this at her office visit, just call the patient and remind her that she is to try it in place of the Robaxin, also let the pharmacy know.  Thanks

## 2022-05-04 NOTE — TELEPHONE ENCOUNTER
----- Message from BROOKLYN Stewart sent at 5/4/2022  7:35 AM EDT -----  Nothing acute noted on chest x-ray, I know we discussed yesterday that you did not want to have the low-dose chest CT but it is recommended based on your history so if you do change your mind let me know and we will order that.  Follow-up if no improvement with shortness of breath or any worsening

## 2022-05-04 NOTE — TELEPHONE ENCOUNTER
Okay good, we will get this ordered, please contact the patient and go through the list of questions about her smoking history on the order so that I can sign it.

## 2022-05-06 ENCOUNTER — HOSPITAL ENCOUNTER (EMERGENCY)
Facility: HOSPITAL | Age: 62
Discharge: LEFT BEFORE DISCHARGE | End: 2022-05-06

## 2022-05-06 PROCEDURE — 99211 OFF/OP EST MAY X REQ PHY/QHP: CPT

## 2022-05-24 ENCOUNTER — APPOINTMENT (OUTPATIENT)
Dept: CT IMAGING | Facility: HOSPITAL | Age: 62
End: 2022-05-24

## 2022-05-25 ENCOUNTER — TELEPHONE (OUTPATIENT)
Dept: FAMILY MEDICINE CLINIC | Facility: CLINIC | Age: 62
End: 2022-05-25

## 2022-05-25 DIAGNOSIS — M41.9 SCOLIOSIS, UNSPECIFIED SCOLIOSIS TYPE, UNSPECIFIED SPINAL REGION: Primary | ICD-10-CM

## 2022-05-25 NOTE — TELEPHONE ENCOUNTER
The hub is sending referral back to us as it looks like patient has not had imaging on her spine done since 2020. They are needing updated imaging before scheduling I assume.     This is the message from the hub: PATIENT HAS SCOLIOSIS, LAST IMAGING STATES 35 DEGREE CURVE IN 2020, DOES PATIENT HAVE NEW IMAGING FOR DEGREE OF CURVE, PLEASE ADVISE

## 2022-05-26 NOTE — TELEPHONE ENCOUNTER
Yes, I had a separate message about this recently and I had requested that orders be placed for her to go and have new x-rays, but I do not see them in the system.  I have placed the orders myself and she may go have them done today

## 2022-06-14 ENCOUNTER — TELEPHONE (OUTPATIENT)
Dept: FAMILY MEDICINE CLINIC | Facility: CLINIC | Age: 62
End: 2022-06-14

## 2022-06-14 ENCOUNTER — HOSPITAL ENCOUNTER (OUTPATIENT)
Dept: CT IMAGING | Facility: HOSPITAL | Age: 62
Discharge: HOME | End: 2022-06-14
Admitting: NURSE PRACTITIONER

## 2022-06-14 DIAGNOSIS — Z12.2 SCREENING FOR LUNG CANCER: ICD-10-CM

## 2022-06-14 DIAGNOSIS — Z87.891 PERSONAL HISTORY OF NICOTINE DEPENDENCE: ICD-10-CM

## 2022-06-14 DIAGNOSIS — F17.200 SMOKER: ICD-10-CM

## 2022-06-14 PROCEDURE — 71271 CT THORAX LUNG CANCER SCR C-: CPT

## 2022-06-14 NOTE — TELEPHONE ENCOUNTER
----- Message from BROOKLYN Stewart sent at 6/14/2022 12:40 PM EDT -----  CT negative, recheck in 1 year.

## 2022-06-21 ENCOUNTER — TELEPHONE (OUTPATIENT)
Dept: FAMILY MEDICINE CLINIC | Facility: CLINIC | Age: 62
End: 2022-06-21

## 2022-06-24 ENCOUNTER — TREATMENT (OUTPATIENT)
Dept: PHYSICAL THERAPY | Facility: CLINIC | Age: 62
End: 2022-06-24

## 2022-06-24 DIAGNOSIS — M25.559 PAIN, HIP: ICD-10-CM

## 2022-06-24 DIAGNOSIS — M25.60 STIFFNESS OF JOINT: ICD-10-CM

## 2022-06-24 DIAGNOSIS — M41.25 OTHER IDIOPATHIC SCOLIOSIS, THORACOLUMBAR REGION: Primary | ICD-10-CM

## 2022-06-24 DIAGNOSIS — M54.50 CHRONIC LOW BACK PAIN, UNSPECIFIED BACK PAIN LATERALITY, UNSPECIFIED WHETHER SCIATICA PRESENT: ICD-10-CM

## 2022-06-24 DIAGNOSIS — G89.29 CHRONIC LOW BACK PAIN, UNSPECIFIED BACK PAIN LATERALITY, UNSPECIFIED WHETHER SCIATICA PRESENT: ICD-10-CM

## 2022-06-24 DIAGNOSIS — R29.3 POSTURE IMBALANCE: ICD-10-CM

## 2022-06-24 PROCEDURE — 97110 THERAPEUTIC EXERCISES: CPT | Performed by: PHYSICAL THERAPIST

## 2022-06-24 PROCEDURE — 97162 PT EVAL MOD COMPLEX 30 MIN: CPT | Performed by: PHYSICAL THERAPIST

## 2022-06-24 NOTE — PROGRESS NOTES
Physical Therapy Initial Evaluation and Plan of Care    Patient: Cindy Bella   : 1960  Diagnosis/ICD-10 Code:  Other idiopathic scoliosis, thoracolumbar region [M41.25]  Referring practitioner: BROOKLYN Stewart  Date of Initial Visit: 2022  Today's Date: 2022  Patient seen for 1 sessions           Subjective Questionnaire: Oswestry: 32 = 64% limitation      Subjective Evaluation    History of Present Illness  Mechanism of injury: Pt presents to therapy with reports of low back pain that has been going on for years, she was diagnosed with scoliosis when she was young, numbness in her bilateral hips, she also has neck/shoulder pain. She has been to therapy many times in the past, but she is wanting to get back into stretches and interventions for improving her mobility and reducing pain. She has had some trouble with her bladder recently and her primary physician is following up on this. She is retired, but watches her grandchildren in the afternoon.     PMH: cancer: brain tumor currently, scoliosis, tachycardia,     Pain  Current pain rating: 3  At best pain rating: 3  At worst pain ratin  Quality: dull ache and tight  Aggravating factors: lifting    Social Support  Lives in: one-story house  Lives with: alone    Diagnostic Tests  X-ray: abnormal    Treatments  Previous treatment: physical therapy, massage and chiropractic  Patient Goals  Patient goals for therapy: decreased pain, increased motion, increased strength and improved balance             Objective          Postural Observations  Seated posture: poor  Standing posture: poor    Additional Postural Observation Details  Severe Right thoracic curve, severe left lumbar curve, moderate to severed kyphosis of thoracic spine, right iliac crest elevated and shifted forward in standing    Palpation   Left   Muscle spasm in the erector spinae.     Right   Muscle spasm in the erector spinae.     Active Range of Motion     Lumbar    Flexion: 95 degrees   Extension: 25 degrees   Left lateral flexion: 35 degrees   Right lateral flexion: 10 degrees     Additional Active Range of Motion Details  All motion initiated at the hips, very stiff throughout lumbar and thoracic spine    Rotation: limited right in sitting, more limited left in standing    Strength/Myotome Testing     Left Shoulder     Planes of Motion   Flexion: 4+   Abduction: 4+   External rotation at 0°: 4   Internal rotation at 0°: 4+   Horizontal abduction: 3+     Isolated Muscles   Lower trapezius: 3+   Middle trapezius: 3+     Right Shoulder     Planes of Motion   Flexion: 4   Abduction: 4   External rotation at 0°: 4   Internal rotation at 0°: 4+   Horizontal abduction: 3+     Isolated Muscles   Lower trapezius: 3+   Middle trapezius: 3+     Left Hip   Planes of Motion   Flexion: 4  Extension: 4  Abduction: 4  Adduction: 4+  External rotation: 4+  Internal rotation: 4-    Right Hip   Planes of Motion   Flexion: 4  Extension: 4  Abduction: 4  Adduction: 4+  External rotation: 4  Internal rotation: 4    Left Knee   Flexion: 4  Extension: 4+    Right Knee   Flexion: 4+  Extension: 4+    Left Ankle/Foot   Dorsiflexion: 4+    Right Ankle/Foot   Dorsiflexion: 4+      See Exercise, Manual, and Modality Logs for complete treatment.     Assessment & Plan     Assessment  Impairments: abnormal muscle firing, abnormal or restricted ROM, activity intolerance, impaired physical strength and pain with function  Functional Limitations: carrying objects, lifting, walking, uncomfortable because of pain, sitting and standing  Assessment details: The patient presents to physical therapy with complaints of low back pain, postural deficits related to severe scoliosis and thoracic kyphosis. The patient presents with associated upper and lower extremity weakness, stiffness, and functional deficits (OSWESTRY). The patient would benefit from skilled PT intervention to address the above mentioned functional  limitations.     Prognosis: good    Goals  Plan Goals: LOW BACK PROBLEMS:    1. The patient complains of low back pain.  LTG 1: 12 weeks:  The patient will report a pain rating of 3/10 or better at worst in order to improve  tolerance to activities of daily living and improve sleep quality.  STATUS:  New  STG 1a: 6 weeks:  The patient will report a pain rating of 5/10 or better at its worst.  STATUS:  New  TREATMENT:  Therapeutic exercises, manual therapy, aquatic therapy, home exercise   instruction, and modalities as needed for pain to include:  electrical stimulation, moist heat, ice,   ultrasound, and diathermy.      2. The patient demonstrates weakness of the bilateral hip.  LTG 2: 12 weeks:  The patient will demonstrate 4+ /5 strength for bilateral hip flexion, abduction,  and extension in order to improve hip stability.  STATUS:  New  STG 2a: 6 weeks:  The patient will demonstrate 4 /5 strength for bilateral hip flexion, abduction,  and extension.  STATUS:  New  TREATMENT: Therapeutic exercises, manual therapy, aquatic therapy, home exercise instruction,  and modalities as needed for pain to include:  electrical stimulation, moist heat, ice, ultrasound, and   diathermy.      3. The patient has limited strength of the bilateral shoulder.   LTG 3: 12 weeks:  The patient will demonstrate 4+ /5 strength for bilateral shoulder flexion, abduction, external rotation, and internal rotation in order to demonstrate improved shoulder stability.    STATUS:  New   STG 3a: 6 weeks:  The patient will demonstrate 4 /5 strength for bilateral shoulder flexion, abduction, external rotation, and internal rotation.    STATUS:  New   STG3b:  3 weeks:  The patient will be independent with home exercises.     STATUS:  New   TREATMENT: Manual therapy, therapeutic exercise, home exercise instruction, and modalities as needed to include: electrical stimulation, ultrasound, moist heat, and ice.      4. Mobility: Walking/Moving Around  Functional Limitation    LTG 4: 12 weeks:  The patient will demonstrate 1-19 % limitation by achieving a score of 1-9 on the RACHEL.  STATUS:  New  STG 4 a: 6 weeks:  The patient will demonstrate 20-39 % limitation by achieving a score of 10-19 on the RACHEL.    STATUS:  New  TREATMENT:  Manual therapy, therapeutic exercise, home exercise instruction, and modalities as needed to include: moist heat, electrical stimulation, and ultrasound.      Plan  Therapy options: will be seen for skilled therapy services  Planned modality interventions: TENS, cryotherapy, thermotherapy (hydrocollator packs) and traction  Planned therapy interventions: manual therapy, stretching, strengthening, therapeutic activities, neuromuscular re-education, home exercise program, joint mobilization, functional ROM exercises, soft tissue mobilization, spinal/joint mobilization, flexibility and gait training  Frequency: 3x week  Duration in weeks: 12  Treatment plan discussed with: patient        Visit Diagnoses:    ICD-10-CM ICD-9-CM   1. Other idiopathic scoliosis, thoracolumbar region  M41.25 737.30   2. Chronic low back pain, unspecified back pain laterality, unspecified whether sciatica present  M54.50 724.2    G89.29 338.29   3. Pain, hip  M25.559 719.45   4. Stiffness of joint  M25.60 719.50   5. Posture imbalance  R29.3 729.90       History # of Personal Factors and/or Comorbidities: HIGH (3+)  Examination of Body System(s): # of elements: HIGH (4+)  Clinical Presentation: STABLE   Clinical Decision Making: MODERATE      Timed:         Manual Therapy:    0     mins  00420;     Therapeutic Exercise:    15     mins  10489;     Neuromuscular Andres:    0    mins  79637;    Therapeutic Activity:     0     mins  12848;     Gait Trainin     mins  52127;     Ultrasound:     0     mins  31310;    Ionto                               0    mins   97146  Self Care                       0     mins   61569  Canalith Repos    0     mins  44864      Un-Timed:  Electrical Stimulation:    0     mins  38406 ( );  Dry Needling     0     mins self-pay  Traction     0     mins 64133  Low Eval     0     Mins  20157  Mod Eval     30     Mins  53184  High Eval                       0     Mins  17722  Re-Eval                           0    mins  52253  Moist heat     10     mins No charge    Timed Treatment:   15   mins   Total Treatment:     55   mins    PT SIGNATURE: Librado Henry PT     Electronically signed 6/24/2022    [place] License: PT - [id number]     Initial Certification  Certification Period: 6/24/2022 thru 9/21/2022  I certify that the therapy services are furnished while this patient is under my care.  The services outlined above are required by this patient, and will be reviewed every 90 days.     PHYSICIAN: Tina Hartley APRN   NPI: [id number]                                        DATE:     Please sign and return via fax to [phone]. Thank you, [facility].

## 2022-06-28 RX ORDER — TIOTROPIUM BROMIDE INHALATION SPRAY 1.56 UG/1
SPRAY, METERED RESPIRATORY (INHALATION)
Qty: 12 G | Refills: 0 | Status: SHIPPED | OUTPATIENT
Start: 2022-06-28 | End: 2022-09-06 | Stop reason: SDUPTHER

## 2022-07-12 ENCOUNTER — TREATMENT (OUTPATIENT)
Dept: PHYSICAL THERAPY | Facility: CLINIC | Age: 62
End: 2022-07-12

## 2022-07-12 DIAGNOSIS — M41.25 OTHER IDIOPATHIC SCOLIOSIS, THORACOLUMBAR REGION: Primary | ICD-10-CM

## 2022-07-12 DIAGNOSIS — M25.559 PAIN, HIP: ICD-10-CM

## 2022-07-12 DIAGNOSIS — M25.60 STIFFNESS OF JOINT: ICD-10-CM

## 2022-07-12 DIAGNOSIS — R29.3 POSTURE IMBALANCE: ICD-10-CM

## 2022-07-12 DIAGNOSIS — G89.29 CHRONIC LOW BACK PAIN, UNSPECIFIED BACK PAIN LATERALITY, UNSPECIFIED WHETHER SCIATICA PRESENT: ICD-10-CM

## 2022-07-12 DIAGNOSIS — M54.50 CHRONIC LOW BACK PAIN, UNSPECIFIED BACK PAIN LATERALITY, UNSPECIFIED WHETHER SCIATICA PRESENT: ICD-10-CM

## 2022-07-12 PROCEDURE — 97140 MANUAL THERAPY 1/> REGIONS: CPT | Performed by: PHYSICAL THERAPIST

## 2022-07-12 PROCEDURE — 97110 THERAPEUTIC EXERCISES: CPT | Performed by: PHYSICAL THERAPIST

## 2022-07-12 NOTE — PROGRESS NOTES
"Physical Therapy Daily Treatment Note      Patient: Cindy Bella   : 1960  Referring practitioner: BROOKLYN Stewart  Date of Initial Visit: Type: THERAPY  Noted: 2022  Today's Date: 2022  Patient seen for 2 sessions           Subjective  Cindy Bella reports: she has a brain tumor and this was diagnosed in . Bridgette indicated it is \"right in the center,\" pointing straight down her head. Dr. Rutledge diagnosed me.     Further discussion as to her pain c/o, her tightness and her goals for therapy.    Cindy said she was sorry for missing the other day and explained her sister fell  and broke her back   Objective   See Exercise, Manual, and Modality Logs for complete treatment.       Assessment/Plan  Time to include performance of MFR, education in self MFR techniques to encourage lengthening, and review of unwinding techniques. Cindy commented on feeling less tightness, and being able to see and feel the difference in her posture/rib hump.    Visit Diagnoses:    ICD-10-CM ICD-9-CM   1. Other idiopathic scoliosis, thoracolumbar region  M41.25 737.30   2. Chronic low back pain, unspecified back pain laterality, unspecified whether sciatica present  M54.50 724.2    G89.29 338.29   3. Pain, hip  M25.559 719.45   4. Stiffness of joint  M25.60 719.50   5. Posture imbalance  R29.3 729.90       Progress per Plan of Care and Progress strengthening /stabilization /functional activity           Timed:  Manual Therapy:    50     mins  07814;  Therapeutic Exercise:    10     mins  11711;     Neuromuscular Andres:        mins  99033;    Therapeutic Activity:          mins  31538;     Gait Training:           mins  38747;     Ultrasound:          mins  11310;    Electrical Stimulation:         mins  62771 ( );  Aquatics  __   mins   68523    Untimed:  Electrical Stimulation:         mins  41115 ( );  Mechanical Traction:         mins  60897;     Timed Treatment:   60   mins   Total " Treatment:     60   mins    Electronically Signed:  Cindy Gamble PTA  Physical Therapist Assistant    [place] PTA license [id number]

## 2022-07-15 ENCOUNTER — TREATMENT (OUTPATIENT)
Dept: PHYSICAL THERAPY | Facility: CLINIC | Age: 62
End: 2022-07-15

## 2022-07-15 DIAGNOSIS — M25.60 STIFFNESS OF JOINT: ICD-10-CM

## 2022-07-15 DIAGNOSIS — M25.559 PAIN, HIP: ICD-10-CM

## 2022-07-15 DIAGNOSIS — M54.50 CHRONIC LOW BACK PAIN, UNSPECIFIED BACK PAIN LATERALITY, UNSPECIFIED WHETHER SCIATICA PRESENT: ICD-10-CM

## 2022-07-15 DIAGNOSIS — G89.29 CHRONIC LOW BACK PAIN, UNSPECIFIED BACK PAIN LATERALITY, UNSPECIFIED WHETHER SCIATICA PRESENT: ICD-10-CM

## 2022-07-15 DIAGNOSIS — R29.3 POSTURE IMBALANCE: ICD-10-CM

## 2022-07-15 DIAGNOSIS — M41.25 OTHER IDIOPATHIC SCOLIOSIS, THORACOLUMBAR REGION: Primary | ICD-10-CM

## 2022-07-15 PROCEDURE — 97530 THERAPEUTIC ACTIVITIES: CPT | Performed by: PHYSICAL THERAPIST

## 2022-07-15 PROCEDURE — 97110 THERAPEUTIC EXERCISES: CPT | Performed by: PHYSICAL THERAPIST

## 2022-07-15 NOTE — PROGRESS NOTES
Physical Therapy Daily Treatment Note    Patient: Cindy Bella   : 1960  Diagnosis/ICD-10 Code:  Other idiopathic scoliosis, thoracolumbar region [M41.25]  Referring practitioner: BROOKLYN Stewart  Date of Initial Visit: Type: THERAPY  Noted: 2022  Today's Date: 7/15/2022  Patient seen for 3 sessions           Subjective Evaluation    History of Present Illness  Mechanism of injury: Pt reporting she is sore today from the new movements, but feels it is helpful just different.    Pain  Current pain ratin           Objective   See Exercise, Manual, and Modality Logs for complete treatment.       Assessment & Plan     Assessment    Assessment details: Pt tolerated today's session well with ongoing progression of strengthening exercises which she tolerated well. Pt would benefit from continued skilled therapy to address deficits. Progress per plan of care.                 Manual Therapy:    0     mins  06735;  Therapeutic Exercise:    26     mins  54474;     Neuromuscular Andres:    0    mins  16366;    Therapeutic Activity:     12     mins  05153;     Gait Trainin     mins  34994;     Ultrasound:     0     mins  44215;    Electrical Stimulation:    0     mins  02092 ( );  Dry Needling     0     mins self-pay;  Aquatic Therapy    0     mins  57234;  Mechanical Traction    0     mins  77307  Moist Heat     0     mins  No charge    Timed Treatment:   38   mins   Total Treatment:     38   mins    Librado Henry PT  Physical Therapist    Electronically signed 7/15/2022    [place] License: PT - [id number]

## 2022-07-26 ENCOUNTER — TREATMENT (OUTPATIENT)
Dept: PHYSICAL THERAPY | Facility: CLINIC | Age: 62
End: 2022-07-26

## 2022-07-26 DIAGNOSIS — M41.25 OTHER IDIOPATHIC SCOLIOSIS, THORACOLUMBAR REGION: Primary | ICD-10-CM

## 2022-07-26 DIAGNOSIS — R29.3 POSTURE IMBALANCE: ICD-10-CM

## 2022-07-26 DIAGNOSIS — M25.559 PAIN, HIP: ICD-10-CM

## 2022-07-26 DIAGNOSIS — G89.29 CHRONIC LOW BACK PAIN, UNSPECIFIED BACK PAIN LATERALITY, UNSPECIFIED WHETHER SCIATICA PRESENT: ICD-10-CM

## 2022-07-26 DIAGNOSIS — M25.60 STIFFNESS OF JOINT: ICD-10-CM

## 2022-07-26 DIAGNOSIS — M54.50 CHRONIC LOW BACK PAIN, UNSPECIFIED BACK PAIN LATERALITY, UNSPECIFIED WHETHER SCIATICA PRESENT: ICD-10-CM

## 2022-07-26 PROCEDURE — 97530 THERAPEUTIC ACTIVITIES: CPT | Performed by: PHYSICAL THERAPIST

## 2022-07-26 PROCEDURE — 97110 THERAPEUTIC EXERCISES: CPT | Performed by: PHYSICAL THERAPIST

## 2022-07-26 NOTE — PROGRESS NOTES
Progress Assessment        Patient: Cindy Bella   : 1960  Diagnosis/ICD-10 Code:  Other idiopathic scoliosis, thoracolumbar region [M41.25]  Referring practitioner: BROOKLYN Stewart  Date of Initial Visit: Type: THERAPY  Noted: 2022  Today's Date: 2022  Patient seen for 4 sessions      Subjective:     Subjective Questionnaire: Oswestry: 1845 = 40% limitation  Clinical Progress: improved  Home Program Compliance: Yes  Treatment has included: therapeutic exercise, manual therapy and therapeutic activity    Subjective Evaluation    History of Present Illness  Mechanism of injury: Pt reporting she feels that she has made good progress with therapy at this time. She has been practicing her exercises and stretches first thing in the morning and also at night prior to going to sleep, which she feels has been very helpful. She feels at this time, she is understanding of her exercises and since she has been to therapy in the past, feels that she can continue with her HEP independently.     Pain  Current pain ratin         Objective        Active Range of Motion      Lumbar   Flexion: 100 degrees   Extension: 25 degrees   Left lateral flexion: 35 degrees   Right lateral flexion: 10 degrees        Strength/Myotome Testing     Left Shoulder      Planes of Motion   Flexion: 4+   Abduction: 4+   External rotation at 0°: 4   Internal rotation at 0°: 4+   Horizontal abduction: 3+      Isolated Muscles   Lower trapezius: 3+   Middle trapezius: 3+     Right Shoulder      Planes of Motion   Flexion: 4   Abduction: 4   External rotation at 0°: 4   Internal rotation at 0°: 4+   Horizontal abduction: 3+      Isolated Muscles   Lower trapezius: 3+   Middle trapezius: 3+      Left Hip   Planes of Motion   Flexion: 4  Extension: 4  Abduction: 4  Adduction: 4+  External rotation: 4+  Internal rotation: 4-     Right Hip   Planes of Motion   Flexion: 4  Extension: 4  Abduction: 4  Adduction: 4+  External  rotation: 4  Internal rotation: 4             Assessment/Plan     Assessment  Impairments: abnormal muscle firing, abnormal or restricted ROM, activity intolerance, impaired physical strength and pain with function  Functional Limitations: carrying objects, lifting, walking, uncomfortable because of pain, sitting and standing  Assessment details: The patient presents to physical therapy with complaints of low back pain, postural deficits related to severe scoliosis and thoracic kyphosis. She demonstrates good understanding of her HEP, her pain levels have decreased, and she is confident in a plan to manage her symptoms. Recommending discharge from therapy at this time.      Prognosis: good   Goals  Plan Goals: LOW BACK PROBLEMS:    1. The patient complains of low back pain.  LTG 1: 12 weeks:  The patient will report a pain rating of 3/10 or better at worst in order to improve  tolerance to activities of daily living and improve sleep quality.  STATUS:  MET  STG 1a: 6 weeks:  The patient will report a pain rating of 5/10 or better at its worst.  STATUS:  MET  TREATMENT:  Therapeutic exercises, manual therapy, aquatic therapy, home exercise   instruction, and modalities as needed for pain to include:  electrical stimulation, moist heat, ice,   ultrasound, and diathermy.      2. The patient demonstrates weakness of the bilateral hip.  LTG 2: 12 weeks:  The patient will demonstrate 4+ /5 strength for bilateral hip flexion, abduction,  and extension in order to improve hip stability.  STATUS:  Not met, progressing  STG 2a: 6 weeks:  The patient will demonstrate 4 /5 strength for bilateral hip flexion, abduction,  and extension.  STATUS:  Not met, progressing  TREATMENT: Therapeutic exercises, manual therapy, aquatic therapy, home exercise instruction,  and modalities as needed for pain to include:  electrical stimulation, moist heat, ice, ultrasound, and   diathermy.      3. The patient has limited strength of the  bilateral shoulder.              LTG 3: 12 weeks:  The patient will demonstrate 4+ /5 strength for bilateral shoulder flexion, abduction, external rotation, and internal rotation in order to demonstrate improved shoulder stability.                          STATUS:  Not met, progressing              STG 3a: 6 weeks:  The patient will demonstrate 4 /5 strength for bilateral shoulder flexion, abduction, external rotation, and internal rotation.                          STATUS:  Not met, progressing              STG3b:  3 weeks:  The patient will be independent with home exercises.                           STATUS: MET              TREATMENT: Manual therapy, therapeutic exercise, home exercise instruction, and modalities as needed to include: electrical stimulation, ultrasound, moist heat, and ice.      4. Mobility: Walking/Moving Around Functional Limitation                   LTG 4: 12 weeks:  The patient will demonstrate 1-19 % limitation by achieving a score of 1-9 on the RACHEL.  STATUS:  Not met  STG 4 a: 6 weeks:  The patient will demonstrate 20-39 % limitation by achieving a score of 10-19 on the RACHEL.    STATUS:  Not met  TREATMENT:  Manual therapy, therapeutic exercise, home exercise instruction, and modalities as needed to include: moist heat, electrical stimulation, and ultrasound.     Plan  Recommendations: discharge    Treatment plan discussed with: patient      Progress toward previous goals: Partially Met    See Exercise, Manual, and Modality Logs for complete treatment.                 PT Signature: Librado Henry PT    Electronically signed 7/26/2022    [place] License: PT - [id number]     Based upon review of the patient's progress and updated HEP, it is my medical opinion that Cindy Bella should discontinue physical therapy treatment at [facility]  [address]  [place] [address] [phone].      Timed:         Manual Therapy:    0     mins  37456;      Therapeutic Exercise:    14     mins  18484;     Neuromuscular Andres:    0    mins  60036;    Therapeutic Activity:     10     mins  22816;     Gait Trainin     mins  47546;     Ultrasound:     0     mins  10004;    Ionto                               0    mins   86082  Self Care                       0     mins   71828  Aquatic                          0     mins 18936            Timed Treatment:   24   mins   Total Treatment:     24   mins      I certify that the therapy services are furnished while this patient is under my care.  The services outlined above are required by this patient, and will be reviewed every 90 days.

## 2022-07-27 ENCOUNTER — OFFICE VISIT (OUTPATIENT)
Dept: GASTROENTEROLOGY | Facility: CLINIC | Age: 62
End: 2022-07-27

## 2022-07-27 VITALS
SYSTOLIC BLOOD PRESSURE: 120 MMHG | HEART RATE: 53 BPM | OXYGEN SATURATION: 98 % | WEIGHT: 95.8 LBS | DIASTOLIC BLOOD PRESSURE: 65 MMHG | BODY MASS INDEX: 16.97 KG/M2 | HEIGHT: 63 IN

## 2022-07-27 DIAGNOSIS — R19.7 DIARRHEA, UNSPECIFIED TYPE: Primary | ICD-10-CM

## 2022-07-27 DIAGNOSIS — R10.84 GENERALIZED ABDOMINAL PAIN: ICD-10-CM

## 2022-07-27 DIAGNOSIS — R15.2 FECAL URGENCY: ICD-10-CM

## 2022-07-27 PROCEDURE — 99214 OFFICE O/P EST MOD 30 MIN: CPT | Performed by: NURSE PRACTITIONER

## 2022-07-27 RX ORDER — DICYCLOMINE HYDROCHLORIDE 10 MG/1
20 CAPSULE ORAL
Qty: 120 CAPSULE | Refills: 1 | Status: SHIPPED | OUTPATIENT
Start: 2022-07-27 | End: 2022-09-06 | Stop reason: SDUPTHER

## 2022-07-27 NOTE — PATIENT INSTRUCTIONS
Recommend meal replacement shake 1-2 times daily.  Fair life protein shakes are lactose-free are sometimes tolerated better if you place in the freezer 10 to 15 minutes prior to drinking.

## 2022-07-27 NOTE — PROGRESS NOTES
Chief Complaint  Diarrhea (Patient is having chronic diarrhea for the past year. She states she use to be 130lbs and has not been able to maintain weight. She states that it is painful in her abdomen and feel a tightness in her stomach and then has to use the bathroom 30 minutes after eating. )    Cindy Bella is a 61 y.o. female who presents to [facility] for follow-up of abdominal pain, diarrhea, nausea, weight loss and anorexia.    History of present Illness    She was last evaluated in .  EGD and colonoscopy were completed however patient has been lost to follow-up.    Reports that she's unable to gain weight.  Weight has been stable since last visit in .       She has a benign brain tumor since .      Admits intermittent abdominal pain.  This is present a few times per week.  If she eats cheese, will experience constipation and this makes pain worse.     She will have watery diarrhea within 20-30 minutes of each meal.  She's avoiding sweets and high fat foods.  She eats a lot of vegetables.  If she eats meat, will eat chicken or steak but doesn't eat meat daily.  Eating peanut butter daily.  Avoids lactose due to GI upset.       Past Medical History:   Diagnosis Date   • Anxiety    • Arthritis    • Brain neoplasm malignant (HCC)    • Depression    • Fibromyalgia, primary    • HL (hearing loss)    • Hyperlipidemia    • Hypertension    • Irritable bowel syndrome    • Low back pain    • Scoliosis    • SVT (supraventricular tachycardia) (HCC)    • Urethral stricture 2015       Past Surgical History:   Procedure Laterality Date   • APPENDECTOMY     • BREAST SURGERY     • CARDIAC SURGERY     •  SECTION           Current Outpatient Medications:   •  albuterol sulfate  (90 Base) MCG/ACT inhaler, INHALE 1 PUFF BY MOUTH EVERY 6 HOURS AS NEEDED, Disp: 54 g, Rfl: 0  •  amLODIPine (NORVASC) 2.5 MG tablet, Take 1 tablet by mouth Daily., Disp:  90 tablet, Rfl: 1  •  aspirin 81 MG EC tablet, Take 81 mg by mouth Daily., Disp: , Rfl:   •  busPIRone (BUSPAR) 10 MG tablet, Take 1 tablet by mouth 2 (Two) Times a Day., Disp: 180 tablet, Rfl: 1  •  citalopram (CeleXA) 40 MG tablet, Take 1 tablet by mouth Daily., Disp: 90 tablet, Rfl: 1  •  ipratropium-albuterol (DUO-NEB) 0.5-2.5 mg/3 ml nebulizer, Take 3 mL by nebulization 3 (Three) Times a Day As Needed for Wheezing., Disp: 360 mL, Rfl: 1  •  losartan-hydrochlorothiazide (HYZAAR) 50-12.5 MG per tablet, Take 2 tablets by mouth Daily., Disp: 180 tablet, Rfl: 1  •  methocarbamol (ROBAXIN) 750 MG tablet, Take 1 tablet by mouth 3 (Three) Times a Day., Disp: 270 tablet, Rfl: 1  •  omeprazole (priLOSEC) 20 MG capsule, Take 2 capsules by mouth Daily., Disp: 90 capsule, Rfl: 1  •  ondansetron ODT (ZOFRAN-ODT) 8 MG disintegrating tablet, Place 1 tablet on the tongue Every 8 (Eight) Hours As Needed for Nausea or Vomiting., Disp: 20 tablet, Rfl: 0  •  propranolol (INDERAL) 80 MG tablet, Take 1 tablet by mouth 2 (Two) Times a Day., Disp: 180 tablet, Rfl: 1  •  rosuvastatin (CRESTOR) 5 MG tablet, Take 1 tablet by mouth Daily., Disp: 90 tablet, Rfl: 1  •  Spiriva Respimat 1.25 MCG/ACT aerosol solution inhaler, INHALE 2 PUFFS BY INHALATION ROUTE ONCE DAILY, Disp: 12 g, Rfl: 0  •  TiZANidine (ZANAFLEX) 4 MG capsule, Take 1 capsule by mouth 3 (Three) Times a Day As Needed for Muscle Spasms., Disp: 30 capsule, Rfl: 0  •  traZODone (DESYREL) 100 MG tablet, Take 100 mg by mouth Daily., Disp: , Rfl:   •  baclofen (LIORESAL) 10 MG tablet, Take 10 mg by mouth Daily As Needed., Disp: , Rfl:   •  dicyclomine (Bentyl) 10 MG capsule, Take 2 capsules by mouth 4 (Four) Times a Day Before Meals & at Bedtime., Disp: 120 capsule, Rfl: 1     Allergies   Allergen Reactions   • Sumatriptan Shortness Of Breath   • Latex Itching   • Levofloxacin Nausea Only   • Nsaids Itching   • Amoxicillin-Pot Clavulanate Rash   • Pseudoephedrine Rash       Family  "History   Problem Relation Age of Onset   • Hypertension Mother    • Stroke Mother    • Kidney disease Mother    • Hypertension Sister    • Stroke Sister    • Hypertension Brother    • Hypertension Maternal Grandmother    • Hypertension Maternal Grandfather    • Hypertension Paternal Grandmother         Cerebral hemorrhage   • Breast cancer Other    • Pancreatic cancer Other    • Thyroid cancer Other         Gland   • Hyperlipidemia Other    • Cancer Other    • Coronary artery disease Other    • Hypertension Other    • Stroke Other    • Hypertension Sister    • Other Sister         Breast Cancer   • Hypertension Sister    • Stroke Sister    • Hypertension Brother    • Other Maternal Aunt    • Other Sister         Social History     Social History Narrative   • Not on file       Objective       Vital Signs:   /65   Pulse 53   Ht 160 cm (63\")   Wt 43.5 kg (95 lb 12.8 oz)   SpO2 98%   BMI 16.97 kg/m²       Physical Exam  Constitutional:       General: She is not in acute distress.     Appearance: Normal appearance. She is well-developed and normal weight.   HENT:      Head: Normocephalic and atraumatic.   Eyes:      Conjunctiva/sclera: Conjunctivae normal.      Pupils: Pupils are equal, round, and reactive to light.      Visual Fields: Right eye visual fields normal and left eye visual fields normal.   Cardiovascular:      Rate and Rhythm: Normal rate and regular rhythm.      Heart sounds: Normal heart sounds.   Pulmonary:      Effort: Pulmonary effort is normal. No retractions.      Breath sounds: Normal breath sounds and air entry.   Abdominal:      General: Bowel sounds are normal.      Palpations: Abdomen is soft.      Tenderness: There is no abdominal tenderness.      Comments: No appreciable hepatosplenomegaly or ascites   Musculoskeletal:         General: Normal range of motion.      Cervical back: Neck supple.      Right lower leg: No edema.      Left lower leg: No edema.   Lymphadenopathy:      " Cervical: No cervical adenopathy.   Skin:     General: Skin is warm and dry.      Findings: No lesion.   Neurological:      General: No focal deficit present.      Mental Status: She is alert and oriented to person, place, and time.   Psychiatric:         Mood and Affect: Mood and affect normal.         Behavior: Behavior normal.         Result Review :     The following data was reviewed by: BROOKLYN Tinajero on 07/27/2022:            Iron Profile   Iron   Date Value Ref Range Status   09/14/2020 64 60 - 170 ug/dL Final     TIBC   Date Value Ref Range Status   09/14/2020 415 245 - 450 ug/dL Final     Comment:     As of 10/15/03, the chemistry department began utilizing a Transferrin  assay. Data suggests that Transferrin is a better estimate of Total Iron  binding capacity than the TIBC assay. As a result the TIBC will now be a  calculated estimate from the measured Transferrin.       Iron Saturation   Date Value Ref Range Status   09/14/2020 15 (L) 20 - 55 % Final     Transferrin   Date Value Ref Range Status   09/14/2020 290.00 250.00 - 380.00 mg/dL Final     9/14/2020 stool culture-negative.    9/2/2020 celiac serology-negative.    11/4/2020 EGD-colonoscopy: Normal mucosa in the whole esophagus.  Small hiatal hernia.  Erythema in the antrum, biopsy-reactive gastropathy.  Normal duodenum.  Normal mucosa in the terminal ileum.  Random colon biopsy with lymphoid aggregates.  3 mm polyp in the sigmoid colon-hyperplastic, completely removed.            Assessment and Plan    Diagnoses and all orders for this visit:    1. Diarrhea, unspecified type (Primary)  -     Clostridium Difficile Toxin - Stool, Per Rectum; Future  -     Enteric Bacterial Panel - Stool, Per Rectum; Future  -     Enteric Parasite Panel - Stool, Per Rectum; Future  -     Fecal Lactoferrin Qual. - Stool, Per Rectum; Future  -     Pancreatic Elastase, Fecal - Stool, Per Rectum; Future  -     Strongyloides Antibody IgG, POLLY; Future  -      CT Abdomen Pelvis With Contrast; Future  -     dicyclomine (Bentyl) 10 MG capsule; Take 2 capsules by mouth 4 (Four) Times a Day Before Meals & at Bedtime.  Dispense: 120 capsule; Refill: 1    2. Fecal urgency  -     dicyclomine (Bentyl) 10 MG capsule; Take 2 capsules by mouth 4 (Four) Times a Day Before Meals & at Bedtime.  Dispense: 120 capsule; Refill: 1    3. Generalized abdominal pain  -     CT Abdomen Pelvis With Contrast; Future              Follow Up   Return in about 3 months (around 10/27/2022) for Diarrhea.  Patient was given instructions and counseling regarding her condition or for health maintenance advice. Please see specific information pulled into the AVS if appropriate.

## 2022-07-29 DIAGNOSIS — M41.9 SCOLIOSIS, UNSPECIFIED SCOLIOSIS TYPE, UNSPECIFIED SPINAL REGION: ICD-10-CM

## 2022-07-29 DIAGNOSIS — K21.9 GASTROESOPHAGEAL REFLUX DISEASE, UNSPECIFIED WHETHER ESOPHAGITIS PRESENT: ICD-10-CM

## 2022-07-29 DIAGNOSIS — E78.2 MIXED HYPERLIPIDEMIA: ICD-10-CM

## 2022-07-29 DIAGNOSIS — G89.29 OTHER CHRONIC PAIN: ICD-10-CM

## 2022-08-01 DIAGNOSIS — E78.2 MIXED HYPERLIPIDEMIA: ICD-10-CM

## 2022-08-01 RX ORDER — ROSUVASTATIN CALCIUM 5 MG/1
TABLET, COATED ORAL
Qty: 90 TABLET | OUTPATIENT
Start: 2022-08-01

## 2022-08-01 RX ORDER — ROSUVASTATIN CALCIUM 5 MG/1
TABLET, COATED ORAL
Qty: 30 TABLET | Refills: 1 | Status: SHIPPED | OUTPATIENT
Start: 2022-08-01 | End: 2022-09-06 | Stop reason: SDUPTHER

## 2022-08-01 RX ORDER — OMEPRAZOLE 20 MG/1
40 CAPSULE, DELAYED RELEASE ORAL DAILY
Qty: 30 CAPSULE | Refills: 1 | Status: SHIPPED | OUTPATIENT
Start: 2022-08-01 | End: 2022-09-06 | Stop reason: SDUPTHER

## 2022-08-01 RX ORDER — TIZANIDINE HYDROCHLORIDE 4 MG/1
CAPSULE, GELATIN COATED ORAL
Qty: 30 CAPSULE | Refills: 0 | OUTPATIENT
Start: 2022-08-01

## 2022-08-01 NOTE — TELEPHONE ENCOUNTER
Please notify patient that I do have to have the labs to continue to refill these medications, I will send in 30 days but we need the blood work to be able to continue to feel.  Also patient is due for mammogram, which she like an order for this as well.?  Also please schedule her for Medicare annual wellness, we can do this telehealth.

## 2022-08-03 ENCOUNTER — OFFICE VISIT (OUTPATIENT)
Dept: FAMILY MEDICINE CLINIC | Facility: CLINIC | Age: 62
End: 2022-08-03

## 2022-08-03 ENCOUNTER — LAB (OUTPATIENT)
Dept: LAB | Facility: HOSPITAL | Age: 62
End: 2022-08-03

## 2022-08-03 VITALS
DIASTOLIC BLOOD PRESSURE: 69 MMHG | HEIGHT: 63 IN | HEART RATE: 64 BPM | BODY MASS INDEX: 16.66 KG/M2 | WEIGHT: 94 LBS | SYSTOLIC BLOOD PRESSURE: 106 MMHG | OXYGEN SATURATION: 100 %

## 2022-08-03 DIAGNOSIS — K21.9 GASTROESOPHAGEAL REFLUX DISEASE, UNSPECIFIED WHETHER ESOPHAGITIS PRESENT: ICD-10-CM

## 2022-08-03 DIAGNOSIS — I10 ESSENTIAL HYPERTENSION: ICD-10-CM

## 2022-08-03 DIAGNOSIS — Z12.31 ENCOUNTER FOR SCREENING MAMMOGRAM FOR MALIGNANT NEOPLASM OF BREAST: Primary | ICD-10-CM

## 2022-08-03 DIAGNOSIS — G89.29 CHRONIC LOW BACK PAIN WITHOUT SCIATICA, UNSPECIFIED BACK PAIN LATERALITY: ICD-10-CM

## 2022-08-03 DIAGNOSIS — M41.9 SCOLIOSIS, UNSPECIFIED SCOLIOSIS TYPE, UNSPECIFIED SPINAL REGION: ICD-10-CM

## 2022-08-03 DIAGNOSIS — R19.7 DIARRHEA, UNSPECIFIED TYPE: ICD-10-CM

## 2022-08-03 DIAGNOSIS — M54.50 CHRONIC LOW BACK PAIN WITHOUT SCIATICA, UNSPECIFIED BACK PAIN LATERALITY: ICD-10-CM

## 2022-08-03 DIAGNOSIS — G89.29 OTHER CHRONIC PAIN: ICD-10-CM

## 2022-08-03 LAB
027 TOXIN: NORMAL
C DIFF TOX GENS STL QL NAA+PROBE: NEGATIVE
LACTOFERRIN STL QL LA: POSITIVE

## 2022-08-03 PROCEDURE — 99214 OFFICE O/P EST MOD 30 MIN: CPT | Performed by: NURSE PRACTITIONER

## 2022-08-03 PROCEDURE — 87493 C DIFF AMPLIFIED PROBE: CPT

## 2022-08-03 PROCEDURE — 87506 IADNA-DNA/RNA PROBE TQ 6-11: CPT

## 2022-08-03 PROCEDURE — 83630 LACTOFERRIN FECAL (QUAL): CPT

## 2022-08-03 PROCEDURE — 82653 EL-1 FECAL QUANTITATIVE: CPT

## 2022-08-03 RX ORDER — ALBUTEROL SULFATE 90 UG/1
2 AEROSOL, METERED RESPIRATORY (INHALATION) EVERY 4 HOURS PRN
Qty: 54 G | Refills: 0 | Status: SHIPPED | OUTPATIENT
Start: 2022-08-03 | End: 2022-09-20 | Stop reason: SDUPTHER

## 2022-08-03 RX ORDER — TIZANIDINE HYDROCHLORIDE 4 MG/1
4 CAPSULE, GELATIN COATED ORAL 3 TIMES DAILY PRN
Qty: 90 CAPSULE | Refills: 1 | Status: SHIPPED | OUTPATIENT
Start: 2022-08-03 | End: 2022-09-20 | Stop reason: SDUPTHER

## 2022-08-03 RX ORDER — ONDANSETRON 8 MG/1
8 TABLET, ORALLY DISINTEGRATING ORAL EVERY 8 HOURS PRN
Qty: 20 TABLET | Refills: 0 | Status: SHIPPED | OUTPATIENT
Start: 2022-08-03 | End: 2022-09-08

## 2022-08-03 NOTE — ASSESSMENT & PLAN NOTE
Hypertension is very well controlled at present, continue current dose of amlodipine, losartan hydrochlorothiazide and propanolol

## 2022-08-03 NOTE — PROGRESS NOTES
.Chief Complaint  Hypertension (Amlodipine, Aspirin, Losartan-HCTZ, Propranolol), Anxiety (Buspar, Celexa), Scoliosis (Robaxin, Zanaflex(Patient was not given this by the pharmacy) ), Heartburn (Prilosec), Nausea (Zofran), Hyperlipidemia (Crestor), and Insomnia (Trazodone)    SUBJECTIVE  Cindy Bella presents to [facility]     Patient presents today to follow-up on chronic conditions including hypertension (Amlodipine, Aspirin, Losartan-HCTZ, Propranolol), Anxiety (Buspar, Celexa), Scoliosis (Robaxin, Zanaflex), Heartburn (Prilosec), Nausea (Zofran), Hyperlipidemia (Crestor), and Insomnia (Trazodone)    History of Present Illness  Past Medical History:   Diagnosis Date   • Anxiety    • Arthritis    • Brain neoplasm malignant (HCC)    • Depression    • Fibromyalgia, primary    • HL (hearing loss)    • Hyperlipidemia    • Hypertension    • Irritable bowel syndrome    • Low back pain    • Scoliosis    • SVT (supraventricular tachycardia) (HCC)    • Urethral stricture 2015      Family History   Problem Relation Age of Onset   • Hypertension Mother    • Stroke Mother    • Kidney disease Mother    • Hypertension Sister    • Stroke Sister    • Hypertension Brother    • Hypertension Maternal Grandmother    • Hypertension Maternal Grandfather    • Hypertension Paternal Grandmother         Cerebral hemorrhage   • Breast cancer Other    • Pancreatic cancer Other    • Thyroid cancer Other         Gland   • Hyperlipidemia Other    • Cancer Other    • Coronary artery disease Other    • Hypertension Other    • Stroke Other    • Hypertension Sister    • Other Sister         Breast Cancer   • Hypertension Sister    • Stroke Sister    • Hypertension Brother    • Other Maternal Aunt    • Other Sister       Past Surgical History:   Procedure Laterality Date   • APPENDECTOMY     • BREAST SURGERY     • CARDIAC SURGERY     •  SECTION          Current Outpatient Medications:   •   "albuterol sulfate  (90 Base) MCG/ACT inhaler, Inhale 2 puffs Every 4 (Four) Hours As Needed for Wheezing., Disp: 54 g, Rfl: 0  •  amLODIPine (NORVASC) 2.5 MG tablet, Take 1 tablet by mouth Daily., Disp: 90 tablet, Rfl: 1  •  aspirin 81 MG EC tablet, Take 81 mg by mouth Daily., Disp: , Rfl:   •  busPIRone (BUSPAR) 10 MG tablet, Take 1 tablet by mouth 2 (Two) Times a Day., Disp: 180 tablet, Rfl: 1  •  citalopram (CeleXA) 40 MG tablet, Take 1 tablet by mouth Daily., Disp: 90 tablet, Rfl: 1  •  dicyclomine (Bentyl) 10 MG capsule, Take 2 capsules by mouth 4 (Four) Times a Day Before Meals & at Bedtime., Disp: 120 capsule, Rfl: 1  •  ipratropium-albuterol (DUO-NEB) 0.5-2.5 mg/3 ml nebulizer, Take 3 mL by nebulization 3 (Three) Times a Day As Needed for Wheezing., Disp: 360 mL, Rfl: 1  •  losartan-hydrochlorothiazide (HYZAAR) 50-12.5 MG per tablet, Take 2 tablets by mouth Daily., Disp: 180 tablet, Rfl: 1  •  methocarbamol (ROBAXIN) 750 MG tablet, Take 1 tablet by mouth 3 (Three) Times a Day., Disp: 270 tablet, Rfl: 1  •  omeprazole (priLOSEC) 20 MG capsule, TAKE 2 CAPSULES BY MOUTH DAILY, Disp: 30 capsule, Rfl: 1  •  ondansetron ODT (ZOFRAN-ODT) 8 MG disintegrating tablet, Place 1 tablet on the tongue Every 8 (Eight) Hours As Needed for Nausea or Vomiting., Disp: 20 tablet, Rfl: 0  •  propranolol (INDERAL) 80 MG tablet, Take 1 tablet by mouth 2 (Two) Times a Day., Disp: 180 tablet, Rfl: 1  •  rosuvastatin (CRESTOR) 5 MG tablet, TAKE 1 TABLET(5 MG) BY MOUTH EVERY DAY, Disp: 30 tablet, Rfl: 1  •  Spiriva Respimat 1.25 MCG/ACT aerosol solution inhaler, INHALE 2 PUFFS BY INHALATION ROUTE ONCE DAILY, Disp: 12 g, Rfl: 0  •  TiZANidine (ZANAFLEX) 4 MG capsule, Take 1 capsule by mouth 3 (Three) Times a Day As Needed for Muscle Spasms., Disp: 90 capsule, Rfl: 1  •  traZODone (DESYREL) 100 MG tablet, Take 100 mg by mouth Daily., Disp: , Rfl:     OBJECTIVE  Vital Signs:   /69   Pulse 64   Ht 160 cm (63\")   Wt 42.6 kg " "(94 lb)   SpO2 100%   BMI 16.65 kg/m²    Estimated body mass index is 16.65 kg/m² as calculated from the following:    Height as of this encounter: 160 cm (63\").    Weight as of this encounter: 42.6 kg (94 lb).     Wt Readings from Last 3 Encounters:   08/03/22 42.6 kg (94 lb)   07/27/22 43.5 kg (95 lb 12.8 oz)   05/03/22 43.8 kg (96 lb 9.6 oz)     BP Readings from Last 3 Encounters:   08/03/22 106/69   07/27/22 120/65   05/03/22 124/82       Physical Exam  Vitals reviewed.   Constitutional:       Appearance: Normal appearance. She is well-developed.   HENT:      Head: Normocephalic and atraumatic.      Right Ear: External ear normal.      Left Ear: External ear normal.   Eyes:      Conjunctiva/sclera: Conjunctivae normal.      Pupils: Pupils are equal, round, and reactive to light.   Cardiovascular:      Rate and Rhythm: Normal rate and regular rhythm.      Heart sounds: No murmur heard.    No friction rub. No gallop.   Pulmonary:      Effort: Pulmonary effort is normal.      Breath sounds: Normal breath sounds. No wheezing or rhonchi.   Skin:     General: Skin is warm and dry.   Neurological:      Mental Status: She is alert and oriented to person, place, and time.      Cranial Nerves: No cranial nerve deficit.   Psychiatric:         Mood and Affect: Mood and affect normal.         Behavior: Behavior normal.         Thought Content: Thought content normal.         Judgment: Judgment normal.          Result Review        CT Chest Low Dose Cancer Screening WO    Result Date: 6/14/2022    1. No evidence for acute intrathoracic abnormality.  No suspicious pulmonary nodules. 2. Ancillary findings as described above.      NINI MANLEY MD       Electronically Signed and Approved By: NINI MANLEY MD on 6/14/2022 at 12:25                The above data has been reviewed by BROOKLYN Bowen 08/03/2022 10:00 EDT.          Patient Care Team:  Tina Hartley APRN as PCP - General (Nurse Practitioner)       "     ASSESSMENT & PLAN    Diagnoses and all orders for this visit:    1. Encounter for screening mammogram for malignant neoplasm of breast (Primary)  -     Mammo Screening Digital Tomosynthesis Bilateral With CAD; Future    2. Gastroesophageal reflux disease, unspecified whether esophagitis present  -     ondansetron ODT (ZOFRAN-ODT) 8 MG disintegrating tablet; Place 1 tablet on the tongue Every 8 (Eight) Hours As Needed for Nausea or Vomiting.  Dispense: 20 tablet; Refill: 0    3. Other chronic pain  -     TiZANidine (ZANAFLEX) 4 MG capsule; Take 1 capsule by mouth 3 (Three) Times a Day As Needed for Muscle Spasms.  Dispense: 90 capsule; Refill: 1    4. Scoliosis, unspecified scoliosis type, unspecified spinal region  -     TiZANidine (ZANAFLEX) 4 MG capsule; Take 1 capsule by mouth 3 (Three) Times a Day As Needed for Muscle Spasms.  Dispense: 90 capsule; Refill: 1    5. Essential hypertension  Assessment & Plan:  Hypertension is very well controlled at present, continue current dose of amlodipine, losartan hydrochlorothiazide and propanolol      6. Chronic low back pain without sciatica, unspecified back pain laterality  Assessment & Plan:  Patient reports that physical therapy has helped significantly with her ability to manage her pain, she was very grateful for the referral    Orders:  -     TiZANidine (ZANAFLEX) 4 MG capsule; Take 1 capsule by mouth 3 (Three) Times a Day As Needed for Muscle Spasms.  Dispense: 90 capsule; Refill: 1    Other orders  -     albuterol sulfate  (90 Base) MCG/ACT inhaler; Inhale 2 puffs Every 4 (Four) Hours As Needed for Wheezing.  Dispense: 54 g; Refill: 0       Tobacco Use: High Risk   • Smoking Tobacco Use: Current Every Day Smoker   • Smokeless Tobacco Use: Never Used       Follow Up     Return in about 6 months (around 2/3/2023).        Patient was given instructions and counseling regarding her condition or for health maintenance advice. Please see specific information  pulled into the AVS if appropriate.   I have reviewed information obtained and documented by others and I have confirmed the accuracy of this documented note.    Tina Hartley APRN

## 2022-08-03 NOTE — ASSESSMENT & PLAN NOTE
Patient reports that physical therapy has helped significantly with her ability to manage her pain, she was very grateful for the referral

## 2022-08-04 LAB
C COLI+JEJ+UPSA DNA STL QL NAA+NON-PROBE: NOT DETECTED
CRYPTOSP DNA STL QL NAA+NON-PROBE: NOT DETECTED
E HISTOLYT DNA STL QL NAA+NON-PROBE: NOT DETECTED
EC STX1+STX2 GENES STL QL NAA+NON-PROBE: NOT DETECTED
G LAMBLIA DNA STL QL NAA+NON-PROBE: NOT DETECTED
S ENT+BONG DNA STL QL NAA+NON-PROBE: NOT DETECTED
SHIGELLA SP+EIEC IPAH ST NAA+NON-PROBE: NOT DETECTED

## 2022-08-08 ENCOUNTER — TELEPHONE (OUTPATIENT)
Dept: GASTROENTEROLOGY | Facility: CLINIC | Age: 62
End: 2022-08-08

## 2022-08-08 LAB — ELASTASE PANC STL-MCNT: 264 UG ELAST./G

## 2022-08-08 NOTE — TELEPHONE ENCOUNTER
----- Message from BROOKLYN Tinajero sent at 8/8/2022  9:34 AM EDT -----  Stool studies show inflammation in the intestines.  Await CT findings and labs.

## 2022-08-09 RX ORDER — IPRATROPIUM BROMIDE AND ALBUTEROL SULFATE 2.5; .5 MG/3ML; MG/3ML
SOLUTION RESPIRATORY (INHALATION)
Qty: 810 ML | Refills: 1 | Status: SHIPPED | OUTPATIENT
Start: 2022-08-09 | End: 2022-09-20 | Stop reason: SDUPTHER

## 2022-09-06 ENCOUNTER — TELEMEDICINE (OUTPATIENT)
Dept: FAMILY MEDICINE CLINIC | Facility: CLINIC | Age: 62
End: 2022-09-06

## 2022-09-06 VITALS — BODY MASS INDEX: 15.94 KG/M2 | WEIGHT: 90 LBS

## 2022-09-06 DIAGNOSIS — R41.3 MEMORY IMPAIRMENT: Primary | ICD-10-CM

## 2022-09-06 DIAGNOSIS — E78.2 MIXED HYPERLIPIDEMIA: ICD-10-CM

## 2022-09-06 DIAGNOSIS — I10 ESSENTIAL HYPERTENSION: ICD-10-CM

## 2022-09-06 DIAGNOSIS — Z00.00 MEDICARE ANNUAL WELLNESS VISIT, SUBSEQUENT: ICD-10-CM

## 2022-09-06 DIAGNOSIS — K21.9 GASTROESOPHAGEAL REFLUX DISEASE, UNSPECIFIED WHETHER ESOPHAGITIS PRESENT: ICD-10-CM

## 2022-09-06 DIAGNOSIS — F41.9 ANXIETY: ICD-10-CM

## 2022-09-06 DIAGNOSIS — R15.2 FECAL URGENCY: ICD-10-CM

## 2022-09-06 DIAGNOSIS — R19.7 DIARRHEA, UNSPECIFIED TYPE: ICD-10-CM

## 2022-09-06 PROCEDURE — G0439 PPPS, SUBSEQ VISIT: HCPCS | Performed by: NURSE PRACTITIONER

## 2022-09-06 PROCEDURE — 1170F FXNL STATUS ASSESSED: CPT | Performed by: NURSE PRACTITIONER

## 2022-09-06 PROCEDURE — 1160F RVW MEDS BY RX/DR IN RCRD: CPT | Performed by: NURSE PRACTITIONER

## 2022-09-06 RX ORDER — OMEPRAZOLE 20 MG/1
40 CAPSULE, DELAYED RELEASE ORAL DAILY
Qty: 30 CAPSULE | Refills: 1 | Status: SHIPPED | OUTPATIENT
Start: 2022-09-06 | End: 2022-09-20 | Stop reason: SDUPTHER

## 2022-09-06 RX ORDER — DICYCLOMINE HYDROCHLORIDE 10 MG/1
20 CAPSULE ORAL
Qty: 120 CAPSULE | Refills: 1 | Status: SHIPPED | OUTPATIENT
Start: 2022-09-06 | End: 2022-09-20 | Stop reason: SDUPTHER

## 2022-09-06 RX ORDER — AMLODIPINE BESYLATE 2.5 MG/1
2.5 TABLET ORAL DAILY
Qty: 90 TABLET | Refills: 1 | Status: SHIPPED | OUTPATIENT
Start: 2022-09-06 | End: 2022-09-06

## 2022-09-06 RX ORDER — TRAZODONE HYDROCHLORIDE 100 MG/1
100 TABLET ORAL DAILY
Qty: 90 TABLET | Refills: 1 | Status: SHIPPED | OUTPATIENT
Start: 2022-09-06 | End: 2022-09-20 | Stop reason: SDUPTHER

## 2022-09-06 RX ORDER — CITALOPRAM 40 MG/1
40 TABLET ORAL DAILY
Qty: 90 TABLET | Refills: 1 | Status: SHIPPED | OUTPATIENT
Start: 2022-09-06 | End: 2022-09-20 | Stop reason: SDUPTHER

## 2022-09-06 RX ORDER — MULTIVITAMIN
1 CAPSULE ORAL DAILY
Qty: 30 CAPSULE | Refills: 11 | Status: SHIPPED | OUTPATIENT
Start: 2022-09-06 | End: 2022-09-20 | Stop reason: SDUPTHER

## 2022-09-06 RX ORDER — ROSUVASTATIN CALCIUM 5 MG/1
5 TABLET, COATED ORAL DAILY
Qty: 30 TABLET | Refills: 1 | Status: SHIPPED | OUTPATIENT
Start: 2022-09-06 | End: 2022-09-07

## 2022-09-06 RX ORDER — LOSARTAN POTASSIUM AND HYDROCHLOROTHIAZIDE 12.5; 5 MG/1; MG/1
2 TABLET ORAL DAILY
Qty: 180 TABLET | Refills: 1 | Status: SHIPPED | OUTPATIENT
Start: 2022-09-06 | End: 2022-09-20 | Stop reason: SDUPTHER

## 2022-09-06 RX ORDER — PROPRANOLOL HYDROCHLORIDE 80 MG/1
80 TABLET ORAL 2 TIMES DAILY
Qty: 180 TABLET | Refills: 1 | Status: SHIPPED | OUTPATIENT
Start: 2022-09-06 | End: 2022-09-20 | Stop reason: SDUPTHER

## 2022-09-06 RX ORDER — ASPIRIN 81 MG/1
81 TABLET ORAL DAILY
Qty: 30 TABLET | Refills: 1 | Status: SHIPPED | OUTPATIENT
Start: 2022-09-06 | End: 2022-09-20 | Stop reason: SDUPTHER

## 2022-09-06 RX ORDER — BUSPIRONE HYDROCHLORIDE 10 MG/1
10 TABLET ORAL 2 TIMES DAILY
Qty: 180 TABLET | Refills: 1 | Status: SHIPPED | OUTPATIENT
Start: 2022-09-06 | End: 2022-09-20 | Stop reason: SDUPTHER

## 2022-09-06 RX ORDER — TIOTROPIUM BROMIDE INHALATION SPRAY 1.56 UG/1
2 SPRAY, METERED RESPIRATORY (INHALATION)
Qty: 12 G | Refills: 2 | Status: SHIPPED | OUTPATIENT
Start: 2022-09-06 | End: 2022-09-20 | Stop reason: SDUPTHER

## 2022-09-06 NOTE — PROGRESS NOTES
"The ABCs of the Annual Wellness Visit  Subsequent Medicare Wellness Visit    Chief Complaint   Patient presents with   • Medicare Wellness-subsequent      Subjective    History of Present Illness:  Cindy Bella is a 61 y.o. female who presents for a Subsequent Medicare Wellness Visit.    The following portions of the patient's history were reviewed and   updated as appropriate: allergies, current medications, past family history, past medical history, past social history, past surgical history and problem list.    Compared to one year ago, the patient feels her physical   health is worse. Pain level is worse , \"states im just getting old\"     Compared to one year ago, the patient feels her mental   health is worse.- states cognative issues seem to be worsening, would like to see neuro again     Recent Hospitalizations:  She was not admitted to the hospital during the last year.       Current Medical Providers:  Patient Care Team:  Tina Hartley APRN as PCP - General (Nurse Practitioner)    Outpatient Medications Prior to Visit   Medication Sig Dispense Refill   • albuterol sulfate  (90 Base) MCG/ACT inhaler Inhale 2 puffs Every 4 (Four) Hours As Needed for Wheezing. 54 g 0   • ipratropium-albuterol (DUO-NEB) 0.5-2.5 mg/3 ml nebulizer USE 3 ML VIA NEBULIZER THREE TIMES DAILY AS NEEDED FOR WHEEZING 810 mL 1   • ondansetron ODT (ZOFRAN-ODT) 8 MG disintegrating tablet Place 1 tablet on the tongue Every 8 (Eight) Hours As Needed for Nausea or Vomiting. 20 tablet 0   • amLODIPine (NORVASC) 2.5 MG tablet Take 1 tablet by mouth Daily. 90 tablet 1   • aspirin 81 MG EC tablet Take 81 mg by mouth Daily.     • busPIRone (BUSPAR) 10 MG tablet Take 1 tablet by mouth 2 (Two) Times a Day. 180 tablet 1   • citalopram (CeleXA) 40 MG tablet Take 1 tablet by mouth Daily. 90 tablet 1   • dicyclomine (Bentyl) 10 MG capsule Take 2 capsules by mouth 4 (Four) Times a Day Before Meals & at Bedtime. 120 capsule 1   • " losartan-hydrochlorothiazide (HYZAAR) 50-12.5 MG per tablet Take 2 tablets by mouth Daily. 180 tablet 1   • omeprazole (priLOSEC) 20 MG capsule TAKE 2 CAPSULES BY MOUTH DAILY 30 capsule 1   • propranolol (INDERAL) 80 MG tablet Take 1 tablet by mouth 2 (Two) Times a Day. 180 tablet 1   • rosuvastatin (CRESTOR) 5 MG tablet TAKE 1 TABLET(5 MG) BY MOUTH EVERY DAY 30 tablet 1   • Spiriva Respimat 1.25 MCG/ACT aerosol solution inhaler INHALE 2 PUFFS BY INHALATION ROUTE ONCE DAILY 12 g 0   • traZODone (DESYREL) 100 MG tablet Take 100 mg by mouth Daily.     • TiZANidine (ZANAFLEX) 4 MG capsule Take 1 capsule by mouth 3 (Three) Times a Day As Needed for Muscle Spasms. 90 capsule 1   • methocarbamol (ROBAXIN) 750 MG tablet Take 1 tablet by mouth 3 (Three) Times a Day. 270 tablet 1     No facility-administered medications prior to visit.       No opioid medication identified on active medication list. I have reviewed chart for other potential  high risk medication/s and harmful drug interactions in the elderly.          Aspirin is on active medication list. Aspirin use is indicated based on review of current medical condition/s. Pros and cons of this therapy have been discussed today. Benefits of this medication outweigh potential harm.  Patient has been encouraged to continue taking this medication.  .      Patient Active Problem List   Diagnosis   • Chronic obstructive pulmonary disease (HCC)   • Mixed hyperlipidemia   • Essential hypertension   • Anxiety   • Arthritis   • Depression   • Idiopathic scoliosis and kyphoscoliosis   • Malignant neoplasm of brain (HCC)   • Scoliosis   • SVT (supraventricular tachycardia) (HCC)   • Urethral stricture   • Chronic low back pain without sciatica     Advance Care Planning  Advance Directive is not on file.  ACP discussion was held with the patient during this visit. Patient does not have an advance directive, information provided.          Objective    Vitals:    09/06/22 1113  "  Weight: 40.8 kg (90 lb)     Estimated body mass index is 15.94 kg/m² as calculated from the following:    Height as of 8/3/22: 160 cm (63\").    Weight as of this encounter: 40.8 kg (90 lb).    BMI is below normal parameters (malnutrition). Recommendations: discussed       Does the patient have evidence of cognitive impairment? Yes    Physical Exam  Constitutional:       Appearance: Normal appearance.   Neurological:      Mental Status: She is alert and oriented to person, place, and time.                 HEALTH RISK ASSESSMENT    Smoking Status:  Social History     Tobacco Use   Smoking Status Current Every Day Smoker   • Packs/day: 1.00   • Years: 30.00   • Pack years: 30.00   • Types: Cigarettes   • Start date: 1974   • Last attempt to quit: 2021   • Years since quittin.8   Smokeless Tobacco Never Used   Tobacco Comment    3/4 ppd     Alcohol Consumption:  Social History     Substance and Sexual Activity   Alcohol Use Never     Fall Risk Screen:    STEADI Fall Risk Assessment has not been completed.    Depression Screening:  PHQ-2/PHQ-9 Depression Screening 2022   Retired PHQ-9 Total Score -   Retired Total Score -   Little Interest or Pleasure in Doing Things 0-->not at all   Feeling Down, Depressed or Hopeless 2-->more than half the days   Trouble Falling or Staying Asleep, or Sleeping Too Much 3-->nearly every day   Feeling Tired or Having Little Energy 3-->nearly every day   Poor Appetite or Overeating 3-->nearly every day   Feeling Bad about Yourself - or that You are a Failure or Have Let Yourself or Your Family Down 0-->not at all   Trouble Concentrating on Things, Such as Reading the Newspaper or Watching Television 3-->nearly every day   Moving or Speaking So Slowly that Other People Could Have Noticed? Or the Opposite - Being So Fidgety 3-->nearly every day   Thoughts that You Would be Better Off Dead or of Hurting Yourself in Some Way 0-->not at all   PHQ-9: Brief Depression Severity " Measure Score 17   If You Checked Off Any Problems, How Difficult Have These Problems Made It For You to Do Your Work, Take Care of Things at Home, or Get Along with Other People? not difficult at all       Health Habits and Functional and Cognitive Screening:  Functional & Cognitive Status 9/6/2022   Do you have difficulty preparing food and eating? Yes   Do you have difficulty bathing yourself, getting dressed or grooming yourself? No   Do you have difficulty using the toilet? Yes   Do you have difficulty moving around from place to place? Yes   Do you have trouble with steps or getting out of a bed or a chair? Yes   Current Diet Limited Junk Food   Dental Exam Not up to date   Eye Exam Not up to date   Exercise (times per week) 7 times per week   Current Exercises Include Walking;House Cleaning   Do you need help using the phone?  No   Are you deaf or do you have serious difficulty hearing?  Yes   Do you need help with transportation? No   Do you need help shopping? No   Do you need help preparing meals?  No   Do you need help with housework?  No   Do you need help with laundry? No   Do you need help taking your medications? No   Do you need help managing money? No   Do you ever drive or ride in a car without wearing a seat belt? No   Have you felt unusual stress, anger or loneliness in the last month? Yes   Who do you live with? Other   If you need help, do you have trouble finding someone available to you? Yes   Have you been bothered in the last four weeks by sexual problems? No   Do you have difficulty concentrating, remembering or making decisions? Yes       Age-appropriate Screening Schedule:  Refer to the list below for future screening recommendations based on patient's age, sex and/or medical conditions. Orders for these recommended tests are listed in the plan section. The patient has been provided with a written plan.    Health Maintenance   Topic Date Due   • TDAP/TD VACCINES (1 - Tdap) Never done   •  MAMMOGRAM  05/26/2019   • PAP SMEAR  Never done   • LIPID PANEL  09/14/2021   • ZOSTER VACCINE (1 of 2) 11/16/2022 (Originally 12/16/2009)   • INFLUENZA VACCINE  10/01/2022              Assessment & Plan   CMS Preventative Services Quick Reference  Risk Factors Identified During Encounter  Chronic Pain   Dementia/Memory   Depression/Dysphoria  The above risks/problems have been discussed with the patient.  Follow up actions/plans if indicated are seen below in the Assessment/Plan Section.  Pertinent information has been shared with the patient in the After Visit Summary.    Diagnoses and all orders for this visit:    1. Memory impairment (Primary)  -     Ambulatory Referral to Neurology    2. Essential hypertension  -     amLODIPine (NORVASC) 2.5 MG tablet; Take 1 tablet by mouth Daily.  Dispense: 90 tablet; Refill: 1  -     losartan-hydrochlorothiazide (HYZAAR) 50-12.5 MG per tablet; Take 2 tablets by mouth Daily.  Dispense: 180 tablet; Refill: 1  -     propranolol (INDERAL) 80 MG tablet; Take 1 tablet by mouth 2 (Two) Times a Day.  Dispense: 180 tablet; Refill: 1    3. Anxiety  -     busPIRone (BUSPAR) 10 MG tablet; Take 1 tablet by mouth 2 (Two) Times a Day.  Dispense: 180 tablet; Refill: 1  -     citalopram (CeleXA) 40 MG tablet; Take 1 tablet by mouth Daily.  Dispense: 90 tablet; Refill: 1    4. Diarrhea, unspecified type  -     dicyclomine (Bentyl) 10 MG capsule; Take 2 capsules by mouth 4 (Four) Times a Day Before Meals & at Bedtime.  Dispense: 120 capsule; Refill: 1    5. Fecal urgency  -     dicyclomine (Bentyl) 10 MG capsule; Take 2 capsules by mouth 4 (Four) Times a Day Before Meals & at Bedtime.  Dispense: 120 capsule; Refill: 1    6. Gastroesophageal reflux disease, unspecified whether esophagitis present  -     omeprazole (priLOSEC) 20 MG capsule; Take 2 capsules by mouth Daily.  Dispense: 30 capsule; Refill: 1    7. Mixed hyperlipidemia  -     rosuvastatin (CRESTOR) 5 MG tablet; Take 1 tablet by mouth  Daily.  Dispense: 30 tablet; Refill: 1    8. Medicare annual wellness visit, subsequent    Other orders  -     aspirin 81 MG EC tablet; Take 1 tablet by mouth Daily.  Dispense: 30 tablet; Refill: 1  -     traZODone (DESYREL) 100 MG tablet; Take 1 tablet by mouth Daily.  Dispense: 90 tablet; Refill: 1  -     Tiotropium Bromide Monohydrate (Spiriva Respimat) 1.25 MCG/ACT aerosol solution inhaler; Inhale 2 puffs Daily.  Dispense: 12 g; Refill: 2  -     Multiple Vitamin (multivitamin) capsule; Take 1 capsule by mouth Daily.  Dispense: 30 capsule; Refill: 11        Follow Up:   No follow-ups on file.     An After Visit Summary and PPPS were made available to the patient.

## 2022-09-07 DIAGNOSIS — M41.9 SCOLIOSIS, UNSPECIFIED SCOLIOSIS TYPE, UNSPECIFIED SPINAL REGION: ICD-10-CM

## 2022-09-07 DIAGNOSIS — I10 ESSENTIAL HYPERTENSION: ICD-10-CM

## 2022-09-07 DIAGNOSIS — R15.2 FECAL URGENCY: ICD-10-CM

## 2022-09-07 DIAGNOSIS — G89.29 OTHER CHRONIC PAIN: ICD-10-CM

## 2022-09-07 DIAGNOSIS — F41.9 ANXIETY: ICD-10-CM

## 2022-09-07 DIAGNOSIS — K21.9 GASTROESOPHAGEAL REFLUX DISEASE, UNSPECIFIED WHETHER ESOPHAGITIS PRESENT: ICD-10-CM

## 2022-09-07 DIAGNOSIS — R19.7 DIARRHEA, UNSPECIFIED TYPE: ICD-10-CM

## 2022-09-07 DIAGNOSIS — G89.29 CHRONIC LOW BACK PAIN WITHOUT SCIATICA, UNSPECIFIED BACK PAIN LATERALITY: ICD-10-CM

## 2022-09-07 DIAGNOSIS — M54.50 CHRONIC LOW BACK PAIN WITHOUT SCIATICA, UNSPECIFIED BACK PAIN LATERALITY: ICD-10-CM

## 2022-09-07 DIAGNOSIS — E78.2 MIXED HYPERLIPIDEMIA: ICD-10-CM

## 2022-09-07 RX ORDER — AMLODIPINE BESYLATE 2.5 MG/1
2.5 TABLET ORAL DAILY
Qty: 90 TABLET | Refills: 1 | Status: CANCELLED | OUTPATIENT
Start: 2022-09-07

## 2022-09-07 RX ORDER — TIZANIDINE HYDROCHLORIDE 4 MG/1
4 CAPSULE, GELATIN COATED ORAL 3 TIMES DAILY PRN
Qty: 90 CAPSULE | Refills: 1 | Status: CANCELLED | OUTPATIENT
Start: 2022-09-07

## 2022-09-07 RX ORDER — ALBUTEROL SULFATE 90 UG/1
2 AEROSOL, METERED RESPIRATORY (INHALATION) EVERY 4 HOURS PRN
Qty: 54 G | Refills: 0 | Status: CANCELLED | OUTPATIENT
Start: 2022-09-07

## 2022-09-07 RX ORDER — TIOTROPIUM BROMIDE INHALATION SPRAY 1.56 UG/1
2 SPRAY, METERED RESPIRATORY (INHALATION)
Qty: 12 G | Refills: 2 | Status: CANCELLED | OUTPATIENT
Start: 2022-09-07

## 2022-09-07 RX ORDER — ASPIRIN 81 MG/1
81 TABLET ORAL DAILY
Qty: 30 TABLET | Refills: 1 | Status: CANCELLED | OUTPATIENT
Start: 2022-09-07

## 2022-09-07 RX ORDER — TRAZODONE HYDROCHLORIDE 100 MG/1
100 TABLET ORAL DAILY
Qty: 90 TABLET | Refills: 1 | Status: CANCELLED | OUTPATIENT
Start: 2022-09-07

## 2022-09-07 RX ORDER — PROPRANOLOL HYDROCHLORIDE 80 MG/1
80 TABLET ORAL 2 TIMES DAILY
Qty: 180 TABLET | Refills: 1 | Status: CANCELLED | OUTPATIENT
Start: 2022-09-07

## 2022-09-07 RX ORDER — ONDANSETRON 8 MG/1
8 TABLET, ORALLY DISINTEGRATING ORAL EVERY 8 HOURS PRN
Qty: 20 TABLET | Refills: 0 | Status: CANCELLED | OUTPATIENT
Start: 2022-09-07

## 2022-09-07 RX ORDER — ROSUVASTATIN CALCIUM 5 MG/1
5 TABLET, COATED ORAL DAILY
Qty: 90 TABLET | Refills: 0 | Status: CANCELLED | OUTPATIENT
Start: 2022-09-07

## 2022-09-07 RX ORDER — AMLODIPINE BESYLATE 2.5 MG/1
2.5 TABLET ORAL DAILY
Qty: 90 TABLET | Refills: 1 | Status: SHIPPED | OUTPATIENT
Start: 2022-09-07 | End: 2022-09-20 | Stop reason: SDUPTHER

## 2022-09-07 RX ORDER — BUSPIRONE HYDROCHLORIDE 10 MG/1
10 TABLET ORAL 2 TIMES DAILY
Qty: 180 TABLET | Refills: 1 | Status: CANCELLED | OUTPATIENT
Start: 2022-09-07

## 2022-09-07 RX ORDER — MULTIVITAMIN
1 CAPSULE ORAL DAILY
Qty: 30 CAPSULE | Refills: 11 | Status: CANCELLED | OUTPATIENT
Start: 2022-09-07 | End: 2023-09-07

## 2022-09-07 RX ORDER — LOSARTAN POTASSIUM AND HYDROCHLOROTHIAZIDE 12.5; 5 MG/1; MG/1
2 TABLET ORAL DAILY
Qty: 180 TABLET | Refills: 1 | Status: CANCELLED | OUTPATIENT
Start: 2022-09-07

## 2022-09-07 RX ORDER — DICYCLOMINE HYDROCHLORIDE 10 MG/1
20 CAPSULE ORAL
Qty: 120 CAPSULE | Refills: 1 | Status: CANCELLED | OUTPATIENT
Start: 2022-09-07

## 2022-09-07 RX ORDER — IPRATROPIUM BROMIDE AND ALBUTEROL SULFATE 2.5; .5 MG/3ML; MG/3ML
SOLUTION RESPIRATORY (INHALATION)
Qty: 810 ML | Refills: 1 | Status: CANCELLED | OUTPATIENT
Start: 2022-09-07

## 2022-09-07 RX ORDER — ROSUVASTATIN CALCIUM 5 MG/1
5 TABLET, COATED ORAL DAILY
Qty: 90 TABLET | Refills: 0 | Status: SHIPPED | OUTPATIENT
Start: 2022-09-07 | End: 2022-09-08

## 2022-09-07 RX ORDER — OMEPRAZOLE 20 MG/1
40 CAPSULE, DELAYED RELEASE ORAL DAILY
Qty: 30 CAPSULE | Refills: 1 | Status: CANCELLED | OUTPATIENT
Start: 2022-09-07

## 2022-09-07 RX ORDER — CITALOPRAM 40 MG/1
40 TABLET ORAL DAILY
Qty: 90 TABLET | Refills: 1 | Status: CANCELLED | OUTPATIENT
Start: 2022-09-07

## 2022-09-08 DIAGNOSIS — E78.2 MIXED HYPERLIPIDEMIA: ICD-10-CM

## 2022-09-08 RX ORDER — ROSUVASTATIN CALCIUM 5 MG/1
5 TABLET, COATED ORAL DAILY
Qty: 90 TABLET | Refills: 0 | Status: SHIPPED | OUTPATIENT
Start: 2022-09-08 | End: 2022-09-20 | Stop reason: SDUPTHER

## 2022-09-08 RX ORDER — ONDANSETRON 8 MG/1
TABLET, ORALLY DISINTEGRATING ORAL
Qty: 20 TABLET | Refills: 0 | Status: SHIPPED | OUTPATIENT
Start: 2022-09-08 | End: 2022-09-20 | Stop reason: SDUPTHER

## 2022-09-19 DIAGNOSIS — I10 ESSENTIAL HYPERTENSION: ICD-10-CM

## 2022-09-19 DIAGNOSIS — M54.50 CHRONIC LOW BACK PAIN WITHOUT SCIATICA, UNSPECIFIED BACK PAIN LATERALITY: ICD-10-CM

## 2022-09-19 DIAGNOSIS — G89.29 OTHER CHRONIC PAIN: ICD-10-CM

## 2022-09-19 DIAGNOSIS — E78.2 MIXED HYPERLIPIDEMIA: ICD-10-CM

## 2022-09-19 DIAGNOSIS — R15.2 FECAL URGENCY: ICD-10-CM

## 2022-09-19 DIAGNOSIS — G89.29 CHRONIC LOW BACK PAIN WITHOUT SCIATICA, UNSPECIFIED BACK PAIN LATERALITY: ICD-10-CM

## 2022-09-19 DIAGNOSIS — M41.9 SCOLIOSIS, UNSPECIFIED SCOLIOSIS TYPE, UNSPECIFIED SPINAL REGION: ICD-10-CM

## 2022-09-19 DIAGNOSIS — R19.7 DIARRHEA, UNSPECIFIED TYPE: ICD-10-CM

## 2022-09-19 DIAGNOSIS — F41.9 ANXIETY: ICD-10-CM

## 2022-09-19 DIAGNOSIS — K21.9 GASTROESOPHAGEAL REFLUX DISEASE, UNSPECIFIED WHETHER ESOPHAGITIS PRESENT: ICD-10-CM

## 2022-09-19 RX ORDER — ALBUTEROL SULFATE 90 UG/1
2 AEROSOL, METERED RESPIRATORY (INHALATION) EVERY 4 HOURS PRN
Qty: 54 G | Refills: 0 | Status: CANCELLED | OUTPATIENT
Start: 2022-09-19

## 2022-09-19 RX ORDER — IPRATROPIUM BROMIDE AND ALBUTEROL SULFATE 2.5; .5 MG/3ML; MG/3ML
SOLUTION RESPIRATORY (INHALATION)
Qty: 810 ML | Refills: 1 | Status: CANCELLED | OUTPATIENT
Start: 2022-09-19

## 2022-09-19 RX ORDER — CITALOPRAM 40 MG/1
40 TABLET ORAL DAILY
Qty: 90 TABLET | Refills: 1 | Status: CANCELLED | OUTPATIENT
Start: 2022-09-19

## 2022-09-19 RX ORDER — ROSUVASTATIN CALCIUM 5 MG/1
5 TABLET, COATED ORAL DAILY
Qty: 90 TABLET | Refills: 0 | Status: CANCELLED | OUTPATIENT
Start: 2022-09-19

## 2022-09-19 RX ORDER — OMEPRAZOLE 20 MG/1
40 CAPSULE, DELAYED RELEASE ORAL DAILY
Qty: 30 CAPSULE | Refills: 1 | Status: CANCELLED | OUTPATIENT
Start: 2022-09-19

## 2022-09-19 RX ORDER — TIOTROPIUM BROMIDE INHALATION SPRAY 1.56 UG/1
2 SPRAY, METERED RESPIRATORY (INHALATION)
Qty: 12 G | Refills: 2 | Status: CANCELLED | OUTPATIENT
Start: 2022-09-19

## 2022-09-19 RX ORDER — TIZANIDINE HYDROCHLORIDE 4 MG/1
4 CAPSULE, GELATIN COATED ORAL 3 TIMES DAILY PRN
Qty: 90 CAPSULE | Refills: 1 | Status: CANCELLED | OUTPATIENT
Start: 2022-09-19

## 2022-09-19 RX ORDER — PROPRANOLOL HYDROCHLORIDE 80 MG/1
80 TABLET ORAL 2 TIMES DAILY
Qty: 180 TABLET | Refills: 1 | Status: CANCELLED | OUTPATIENT
Start: 2022-09-19

## 2022-09-19 RX ORDER — ONDANSETRON 8 MG/1
1 TABLET, ORALLY DISINTEGRATING ORAL ONCE AS NEEDED
Qty: 20 TABLET | Refills: 0 | Status: CANCELLED | OUTPATIENT
Start: 2022-09-19

## 2022-09-19 RX ORDER — DICYCLOMINE HYDROCHLORIDE 10 MG/1
20 CAPSULE ORAL
Qty: 120 CAPSULE | Refills: 1 | Status: CANCELLED | OUTPATIENT
Start: 2022-09-19

## 2022-09-19 RX ORDER — LOSARTAN POTASSIUM AND HYDROCHLOROTHIAZIDE 12.5; 5 MG/1; MG/1
2 TABLET ORAL DAILY
Qty: 180 TABLET | Refills: 1 | Status: CANCELLED | OUTPATIENT
Start: 2022-09-19

## 2022-09-19 RX ORDER — ASPIRIN 81 MG/1
81 TABLET ORAL DAILY
Qty: 30 TABLET | Refills: 1 | Status: CANCELLED | OUTPATIENT
Start: 2022-09-19

## 2022-09-19 RX ORDER — BUSPIRONE HYDROCHLORIDE 10 MG/1
10 TABLET ORAL 2 TIMES DAILY
Qty: 180 TABLET | Refills: 1 | Status: CANCELLED | OUTPATIENT
Start: 2022-09-19

## 2022-09-19 RX ORDER — AMLODIPINE BESYLATE 2.5 MG/1
2.5 TABLET ORAL DAILY
Qty: 90 TABLET | Refills: 1 | Status: CANCELLED | OUTPATIENT
Start: 2022-09-19

## 2022-09-19 RX ORDER — TRAZODONE HYDROCHLORIDE 100 MG/1
100 TABLET ORAL DAILY
Qty: 90 TABLET | Refills: 1 | Status: CANCELLED | OUTPATIENT
Start: 2022-09-19

## 2022-09-19 RX ORDER — MULTIVITAMIN
1 CAPSULE ORAL DAILY
Qty: 30 CAPSULE | Refills: 11 | Status: CANCELLED | OUTPATIENT
Start: 2022-09-19 | End: 2023-09-19

## 2022-09-20 DIAGNOSIS — M41.9 SCOLIOSIS, UNSPECIFIED SCOLIOSIS TYPE, UNSPECIFIED SPINAL REGION: ICD-10-CM

## 2022-09-20 DIAGNOSIS — R19.7 DIARRHEA, UNSPECIFIED TYPE: ICD-10-CM

## 2022-09-20 DIAGNOSIS — M54.50 CHRONIC LOW BACK PAIN WITHOUT SCIATICA, UNSPECIFIED BACK PAIN LATERALITY: ICD-10-CM

## 2022-09-20 DIAGNOSIS — G89.29 OTHER CHRONIC PAIN: ICD-10-CM

## 2022-09-20 DIAGNOSIS — I10 ESSENTIAL HYPERTENSION: ICD-10-CM

## 2022-09-20 DIAGNOSIS — E78.2 MIXED HYPERLIPIDEMIA: ICD-10-CM

## 2022-09-20 DIAGNOSIS — G89.29 CHRONIC LOW BACK PAIN WITHOUT SCIATICA, UNSPECIFIED BACK PAIN LATERALITY: ICD-10-CM

## 2022-09-20 DIAGNOSIS — R15.2 FECAL URGENCY: ICD-10-CM

## 2022-09-20 DIAGNOSIS — F41.9 ANXIETY: ICD-10-CM

## 2022-09-20 DIAGNOSIS — K21.9 GASTROESOPHAGEAL REFLUX DISEASE, UNSPECIFIED WHETHER ESOPHAGITIS PRESENT: ICD-10-CM

## 2022-09-22 RX ORDER — BUSPIRONE HYDROCHLORIDE 10 MG/1
10 TABLET ORAL 2 TIMES DAILY
Qty: 180 TABLET | Refills: 1 | Status: SHIPPED | OUTPATIENT
Start: 2022-09-22 | End: 2023-04-04

## 2022-09-22 RX ORDER — PROPRANOLOL HYDROCHLORIDE 80 MG/1
80 TABLET ORAL 2 TIMES DAILY
Qty: 180 TABLET | Refills: 1 | Status: SHIPPED | OUTPATIENT
Start: 2022-09-22

## 2022-09-22 RX ORDER — ALBUTEROL SULFATE 90 UG/1
2 AEROSOL, METERED RESPIRATORY (INHALATION) EVERY 4 HOURS PRN
Qty: 54 G | Refills: 0 | Status: SHIPPED | OUTPATIENT
Start: 2022-09-22 | End: 2023-01-05

## 2022-09-22 RX ORDER — DICYCLOMINE HYDROCHLORIDE 10 MG/1
20 CAPSULE ORAL
Qty: 120 CAPSULE | Refills: 1 | Status: SHIPPED | OUTPATIENT
Start: 2022-09-22 | End: 2022-12-05

## 2022-09-22 RX ORDER — OMEPRAZOLE 20 MG/1
40 CAPSULE, DELAYED RELEASE ORAL DAILY
Qty: 30 CAPSULE | Refills: 1 | Status: SHIPPED | OUTPATIENT
Start: 2022-09-22 | End: 2022-12-05

## 2022-09-22 RX ORDER — TIZANIDINE HYDROCHLORIDE 4 MG/1
4 CAPSULE, GELATIN COATED ORAL 3 TIMES DAILY PRN
Qty: 90 CAPSULE | Refills: 1 | Status: SHIPPED | OUTPATIENT
Start: 2022-09-22 | End: 2022-11-14

## 2022-09-22 RX ORDER — IPRATROPIUM BROMIDE AND ALBUTEROL SULFATE 2.5; .5 MG/3ML; MG/3ML
3 SOLUTION RESPIRATORY (INHALATION) 3 TIMES DAILY PRN
Qty: 810 ML | Refills: 1 | Status: SHIPPED | OUTPATIENT
Start: 2022-09-22

## 2022-09-22 RX ORDER — TRAZODONE HYDROCHLORIDE 100 MG/1
100 TABLET ORAL DAILY
Qty: 90 TABLET | Refills: 1 | Status: SHIPPED | OUTPATIENT
Start: 2022-09-22

## 2022-09-22 RX ORDER — MULTIVITAMIN
1 CAPSULE ORAL DAILY
Qty: 30 CAPSULE | Refills: 11 | Status: SHIPPED | OUTPATIENT
Start: 2022-09-22 | End: 2023-09-22

## 2022-09-22 RX ORDER — ASPIRIN 81 MG/1
81 TABLET ORAL DAILY
Qty: 30 TABLET | Refills: 1 | Status: SHIPPED | OUTPATIENT
Start: 2022-09-22 | End: 2023-01-05

## 2022-09-22 RX ORDER — CITALOPRAM 40 MG/1
40 TABLET ORAL DAILY
Qty: 90 TABLET | Refills: 1 | Status: SHIPPED | OUTPATIENT
Start: 2022-09-22 | End: 2023-04-04

## 2022-09-22 RX ORDER — TIOTROPIUM BROMIDE INHALATION SPRAY 1.56 UG/1
2 SPRAY, METERED RESPIRATORY (INHALATION)
Qty: 12 G | Refills: 2 | Status: SHIPPED | OUTPATIENT
Start: 2022-09-22

## 2022-09-22 RX ORDER — LOSARTAN POTASSIUM AND HYDROCHLOROTHIAZIDE 12.5; 5 MG/1; MG/1
2 TABLET ORAL DAILY
Qty: 180 TABLET | Refills: 1 | Status: SHIPPED | OUTPATIENT
Start: 2022-09-22 | End: 2023-04-04

## 2022-09-22 RX ORDER — ONDANSETRON 4 MG/1
1 TABLET, ORALLY DISINTEGRATING ORAL AS NEEDED
Qty: 90 TABLET | Refills: 1 | Status: SHIPPED | OUTPATIENT
Start: 2022-09-22

## 2022-09-22 RX ORDER — AMLODIPINE BESYLATE 2.5 MG/1
2.5 TABLET ORAL DAILY
Qty: 90 TABLET | Refills: 1 | Status: SHIPPED | OUTPATIENT
Start: 2022-09-22 | End: 2023-04-04

## 2022-09-22 RX ORDER — ROSUVASTATIN CALCIUM 5 MG/1
5 TABLET, COATED ORAL DAILY
Qty: 90 TABLET | Refills: 0 | Status: SHIPPED | OUTPATIENT
Start: 2022-09-22 | End: 2023-04-04

## 2022-10-27 ENCOUNTER — OFFICE VISIT (OUTPATIENT)
Dept: GASTROENTEROLOGY | Facility: CLINIC | Age: 62
End: 2022-10-27

## 2022-10-27 VITALS
HEIGHT: 63 IN | SYSTOLIC BLOOD PRESSURE: 144 MMHG | HEART RATE: 68 BPM | WEIGHT: 91 LBS | DIASTOLIC BLOOD PRESSURE: 68 MMHG | BODY MASS INDEX: 16.12 KG/M2

## 2022-10-27 DIAGNOSIS — R15.2 FECAL URGENCY: ICD-10-CM

## 2022-10-27 DIAGNOSIS — R19.7 DIARRHEA, UNSPECIFIED TYPE: Primary | ICD-10-CM

## 2022-10-27 DIAGNOSIS — R10.84 GENERALIZED ABDOMINAL PAIN: ICD-10-CM

## 2022-10-27 DIAGNOSIS — R63.4 WEIGHT LOSS, ABNORMAL: ICD-10-CM

## 2022-10-27 PROCEDURE — 99213 OFFICE O/P EST LOW 20 MIN: CPT | Performed by: NURSE PRACTITIONER

## 2022-10-27 NOTE — PROGRESS NOTES
Chief Complaint     Diarrhea    History of Present Illness     Cindy Bella is a 61 y.o. female who presents to [facility] for follow-up of diarrhea, fecal urgency and generalized abdominal pain.    She was prescribed dicyclomine per PCP.  She was taking this four times per day and noticed that this caused constipation.  She hasn't taken this for a few days.  She is trying to avoid foods that she finds to be bothersome but is unable to determine any particular foods.  Reports that she's eating well.  She's lost 3 lbs since last visit.      Reports experiencing pain after eating.  If she eats foods that are greasy, will experience worse pain.    Pain is in lower abdomen.      She was scheduled for a CT scan following last visit but she reports forgetting the appointment.  States that due to her brain tumor she often forgets things and would like future appointments to be communicated with her daughter.       History      Past Medical History:   Diagnosis Date   • Anxiety    • Arthritis    • Brain neoplasm malignant (HCC)    • Depression    • Fibromyalgia, primary    • HL (hearing loss)    • Hyperlipidemia    • Hypertension    • Irritable bowel syndrome    • Low back pain    • Scoliosis    • SVT (supraventricular tachycardia) (HCC)    • Urethral stricture 2015     Past Surgical History:   Procedure Laterality Date   • APPENDECTOMY     • BREAST SURGERY     • CARDIAC SURGERY     •  SECTION     • COLONOSCOPY     • UPPER GASTROINTESTINAL ENDOSCOPY       Family History   Problem Relation Age of Onset   • Hypertension Mother    • Stroke Mother    • Kidney disease Mother    • Hypertension Sister    • Stroke Sister    • Hypertension Brother    • Hypertension Maternal Grandmother    • Hypertension Maternal Grandfather    • Hypertension Paternal Grandmother         Cerebral hemorrhage   • Breast cancer Other    • Pancreatic cancer Other    • Thyroid cancer Other          Gland   • Hyperlipidemia Other    • Cancer Other    • Coronary artery disease Other    • Hypertension Other    • Stroke Other    • Hypertension Sister    • Other Sister         Breast Cancer   • Hypertension Sister    • Stroke Sister    • Hypertension Brother    • Other Maternal Aunt    • Other Sister         Current Medications       Current Outpatient Medications:   •  albuterol sulfate  (90 Base) MCG/ACT inhaler, Inhale 2 puffs Every 4 (Four) Hours As Needed for Wheezing., Disp: 54 g, Rfl: 0  •  amLODIPine (NORVASC) 2.5 MG tablet, Take 1 tablet by mouth Daily., Disp: 90 tablet, Rfl: 1  •  aspirin 81 MG EC tablet, Take 1 tablet by mouth Daily., Disp: 30 tablet, Rfl: 1  •  busPIRone (BUSPAR) 10 MG tablet, Take 1 tablet by mouth 2 (Two) Times a Day., Disp: 180 tablet, Rfl: 1  •  citalopram (CeleXA) 40 MG tablet, Take 1 tablet by mouth Daily., Disp: 90 tablet, Rfl: 1  •  dicyclomine (Bentyl) 10 MG capsule, Take 2 capsules by mouth 4 (Four) Times a Day Before Meals & at Bedtime., Disp: 120 capsule, Rfl: 1  •  ipratropium-albuterol (DUO-NEB) 0.5-2.5 mg/3 ml nebulizer, Take 3 mL by nebulization 3 (Three) Times a Day As Needed for Wheezing., Disp: 810 mL, Rfl: 1  •  losartan-hydrochlorothiazide (HYZAAR) 50-12.5 MG per tablet, Take 2 tablets by mouth Daily., Disp: 180 tablet, Rfl: 1  •  Multiple Vitamin (multivitamin) capsule, Take 1 capsule by mouth Daily., Disp: 30 capsule, Rfl: 11  •  omeprazole (priLOSEC) 20 MG capsule, Take 2 capsules by mouth Daily., Disp: 30 capsule, Rfl: 1  •  ondansetron ODT (ZOFRAN-ODT) 4 MG disintegrating tablet, Place 0.25 tablets on the tongue As Needed for Nausea or Vomiting., Disp: 90 tablet, Rfl: 1  •  propranolol (INDERAL) 80 MG tablet, Take 1 tablet by mouth 2 (Two) Times a Day., Disp: 180 tablet, Rfl: 1  •  Tiotropium Bromide Monohydrate (Spiriva Respimat) 1.25 MCG/ACT aerosol solution inhaler, Inhale 2 puffs Daily., Disp: 12 g, Rfl: 2  •  TiZANidine (ZANAFLEX) 4 MG capsule, Take  "1 capsule by mouth 3 (Three) Times a Day As Needed for Muscle Spasms., Disp: 90 capsule, Rfl: 1  •  traZODone (DESYREL) 100 MG tablet, Take 1 tablet by mouth Daily., Disp: 90 tablet, Rfl: 1  •  rosuvastatin (CRESTOR) 5 MG tablet, Take 1 tablet by mouth Daily., Disp: 90 tablet, Rfl: 0     Allergies     Allergies   Allergen Reactions   • Sumatriptan Shortness Of Breath   • Latex Itching   • Levofloxacin Nausea Only   • Nsaids Itching   • Amoxicillin-Pot Clavulanate Rash   • Pseudoephedrine Rash       Social History       Social History     Social History Narrative   • Not on file         Objective       /68 (BP Location: Left arm, Patient Position: Sitting, Cuff Size: Adult)   Pulse 68   Ht 160 cm (63\")   Wt 41.3 kg (91 lb)   BMI 16.12 kg/m²       Physical Exam    Results       Result Review :    The following data was reviewed by: BROOKLYN Tinajero on 10/27/2022:    8/3/2022 pancreatic elastase-264, fecal lactoferrin-positive, enteric parasite panel-negative, enteric bacterial panel-negative, stool for C. difficile-negative.                 Assessment and Plan              Diagnoses and all orders for this visit:    1. Diarrhea, unspecified type (Primary)    2. Generalized abdominal pain    3. Fecal urgency    4. Weight loss, abnormal      Encouraged patient to take dicyclomine PRN diarrhea.  Our office will reschedule CT while patient is in office today.       I spent 25 minutes caring for Cindy on this date of service. This time includes time spent by me in the following activities:reviewing tests, performing a medically appropriate examination and/or evaluation , counseling and educating the patient/family/caregiver, documenting information in the medical record and independently interpreting results and communicating that information with the patient/family/caregiver  Follow Up     Follow Up   Return in about 3 months (around 1/27/2023) for abdominal pain, Diarrhea.  Patient was given " instructions and counseling regarding her condition or for health maintenance advice. Please see specific information pulled into the AVS if appropriate.

## 2022-11-08 ENCOUNTER — TELEPHONE (OUTPATIENT)
Dept: FAMILY MEDICINE CLINIC | Facility: CLINIC | Age: 62
End: 2022-11-08

## 2022-11-08 NOTE — TELEPHONE ENCOUNTER
Caller: [facility] [place] - [place], [place] - [address] - [phone] [facility] [phone] FX    Relationship: Pharmacy    Best call back number: [phone]    Requested Prescriptions:   SYMBICORT  METHOCARBAMOL  RETIN - CREAM      Pharmacy where request should be sent: [facility] [place] - [place], [place] - [address] - [phone] [facility] [phone] FX     ADDITIONAL INFO:    [facility] STATES THE PATIENT MAY NO LONGER BE ON THESE MEDICATIONS.    Does the patient have less than a 3 day supply:  [x] Yes  [] No    Armando Crook, Tiffanie Rep   11/08/22 15:53 EST

## 2022-11-09 NOTE — TELEPHONE ENCOUNTER
Called pt to confirm if she is taking medications listed below.     Pt states she is but stopped taking her Crestor as she was having muscles cramps and falling often. Pt stopped it 2 weeks ago and symptoms have improved.     Advised pt can continue not taking the Crestor since her symptoms have improved and made pt an appt in one month to discuss symptoms and other med opitons.     There are labs ordered from last visit.  Advised to have labs done proir to appt to

## 2022-11-10 ENCOUNTER — TELEPHONE (OUTPATIENT)
Dept: FAMILY MEDICINE CLINIC | Facility: CLINIC | Age: 62
End: 2022-11-10

## 2022-11-10 DIAGNOSIS — M62.838 MUSCLE SPASM: Primary | ICD-10-CM

## 2022-11-10 NOTE — TELEPHONE ENCOUNTER
Received fax from [facility] stating the pts insurance requires tablets for the Tizanidine instead of the capsules that was sent on the 09/22/22.     Please advise.

## 2022-11-14 RX ORDER — TIZANIDINE 4 MG/1
4 TABLET ORAL 3 TIMES DAILY PRN
Qty: 90 TABLET | Refills: 1 | Status: SHIPPED | OUTPATIENT
Start: 2022-11-14 | End: 2023-01-03

## 2022-11-17 ENCOUNTER — TELEPHONE (OUTPATIENT)
Dept: GASTROENTEROLOGY | Facility: CLINIC | Age: 62
End: 2022-11-17

## 2022-11-29 NOTE — TELEPHONE ENCOUNTER
Left message with patient asking for a returned call to follow up on overdue results for imaging studies.

## 2022-11-30 DIAGNOSIS — R15.2 FECAL URGENCY: ICD-10-CM

## 2022-11-30 DIAGNOSIS — R19.7 DIARRHEA, UNSPECIFIED TYPE: ICD-10-CM

## 2022-11-30 DIAGNOSIS — K21.9 GASTROESOPHAGEAL REFLUX DISEASE, UNSPECIFIED WHETHER ESOPHAGITIS PRESENT: ICD-10-CM

## 2022-12-05 RX ORDER — OMEPRAZOLE 20 MG/1
CAPSULE, DELAYED RELEASE ORAL
Qty: 30 CAPSULE | Refills: 2 | Status: SHIPPED | OUTPATIENT
Start: 2022-12-05 | End: 2023-02-06

## 2022-12-05 RX ORDER — DICYCLOMINE HYDROCHLORIDE 10 MG/1
CAPSULE ORAL
Qty: 120 CAPSULE | Refills: 2 | Status: SHIPPED | OUTPATIENT
Start: 2022-12-05 | End: 2023-03-06

## 2023-01-03 ENCOUNTER — TELEPHONE (OUTPATIENT)
Dept: FAMILY MEDICINE CLINIC | Facility: CLINIC | Age: 63
End: 2023-01-03
Payer: MEDICARE

## 2023-01-03 RX ORDER — METHOCARBAMOL 750 MG/1
750 TABLET, FILM COATED ORAL 3 TIMES DAILY PRN
Qty: 60 TABLET | Refills: 1 | Status: SHIPPED | OUTPATIENT
Start: 2023-01-03

## 2023-01-03 NOTE — TELEPHONE ENCOUNTER
LRF 05/03/22 it was marked discontinued on 09/06/22     Pt and pharmacy did a three way call to get this refilled

## 2023-01-03 NOTE — TELEPHONE ENCOUNTER
Caller: [facility] [place] - [place], [place] - [address] - [phone] [facility] [phone] FX    Relationship: Pharmacy    Best call back number: [phone]  Requested Prescriptions:    METHOCARBAMOL 750 MG ONE TABLET THREE TIMES DAILY PER PATIENT    Pharmacy where request should be sent: [facility] - [place], [place] - [address] [facility] - [phone] [facility] [phone] FX     Additional details provided by patient: PATIENT AND PHARMACY WERE ON THE LINE . PATIENT SAID SHE IS OUT OF MEDICATION. THIS MEDICATION IS NOT IN LIST    Does the patient have less than a 3 day supply:  [x] Yes  [] No    Would you like a call back once the refill request has been completed: [] Yes [] No    If the office needs to give you a call back, can they leave a voicemail: [] Yes [] No    Tiffanie Bronson Rep   01/03/23 11:58 EST

## 2023-01-04 NOTE — PROGRESS NOTES
Well Woman Visit    CC: WWE     Myriad intake: No    Tobacco/Nicotine use:  Quit    HPI:   62 y.o. Contraception or HRT: Post menopausal  Unexplained weight loss of 50 pounds, being evaluated by Dr. Maynard    History: PMHx, Meds, Allergies, PSHx, Social Hx, and POBHx all reviewed and updated.  PCP: does manage PMHx and preventative labs      /62   Wt 41.3 kg (91 lb)   BMI 16.12 kg/m²     Physical Exam Physical Exam  Vitals and nursing note reviewed. Exam conducted with a chaperone present.   Constitutional:       Appearance: Normal appearance.   Neck:      Thyroid: No thyroid mass or thyromegaly.   Cardiovascular:      Rate and Rhythm: Regular rhythm.   Pulmonary:      Effort: Pulmonary effort is normal.      Unlabored  Chest:      Breasts: normal texture glandularity        Right: No mass, nipple discharge or tenderness.         Left: No mass, nipple discharge or tenderness.   Abdominal:      General: There is no distension.      Palpations: Abdomen is soft.      Tenderness: There is no guarding or rebound.   Genitourinary:     General: Normal vulva.      Labia:   No lesions     Urethra: No urethral pain or urethral lesion.      Vagina: Good apical support     Uterus: Surgically absent     Adnexa: Right adnexa normal and left adnexa normal.   Musculoskeletal:      Cervical back: Neck supple. No tenderness.   Skin:     General: Skin is warm and dry.   Neurological:      Mental Status: She is alert and oriented to person, place, and time.   Psychiatric:         Mood and Affect: Mood normal.         Behavior: Behavior normal.         Thought Content: Thought content normal.       ASSESSMENT AND PLAN:  WWE    Diagnoses and all orders for this visit:    1. Well woman exam with routine gynecological exam (Primary)  -     IgP, Aptima HPV        Counseling:     Mammogram recommended yearly  Preventative:   MMG ordered      Follow Up:  No follow-ups on file.    Mike Manning Sr., MD  2023

## 2023-01-05 ENCOUNTER — OFFICE VISIT (OUTPATIENT)
Dept: OBSTETRICS AND GYNECOLOGY | Facility: CLINIC | Age: 63
End: 2023-01-05
Payer: MEDICARE

## 2023-01-05 VITALS — WEIGHT: 91 LBS | SYSTOLIC BLOOD PRESSURE: 110 MMHG | BODY MASS INDEX: 16.12 KG/M2 | DIASTOLIC BLOOD PRESSURE: 62 MMHG

## 2023-01-05 DIAGNOSIS — Z12.31 BREAST CANCER SCREENING BY MAMMOGRAM: ICD-10-CM

## 2023-01-05 DIAGNOSIS — Z01.419 WELL WOMAN EXAM WITH ROUTINE GYNECOLOGICAL EXAM: Primary | ICD-10-CM

## 2023-01-05 PROCEDURE — G0101 CA SCREEN;PELVIC/BREAST EXAM: HCPCS | Performed by: OBSTETRICS & GYNECOLOGY

## 2023-01-05 RX ORDER — ASPIRIN 81 MG/1
TABLET, COATED ORAL
Qty: 90 TABLET | Refills: 1 | Status: SHIPPED | OUTPATIENT
Start: 2023-01-05

## 2023-01-05 RX ORDER — ALBUTEROL SULFATE 90 UG/1
AEROSOL, METERED RESPIRATORY (INHALATION)
Qty: 20.1 G | Refills: 0 | Status: SHIPPED | OUTPATIENT
Start: 2023-01-05 | End: 2023-04-04

## 2023-01-20 ENCOUNTER — TELEPHONE (OUTPATIENT)
Dept: FAMILY MEDICINE CLINIC | Facility: CLINIC | Age: 63
End: 2023-01-20
Payer: MEDICARE

## 2023-01-20 NOTE — TELEPHONE ENCOUNTER
PATIENT CALLED TO LET US KNOW ABOUT HAVING A SPELL ON 1/19 WHERE SHE PASSED OUT ON THE FLOOR NEXT TO HER BED. I TOLD HER THAT SHE SHOULD GO TO THE ER. SHE SAID SHE WILL IF SHE HAS ANY MORE MOMENTS WHERE SHE FEELS OFF OR DIZZY. [phone]  HAS F/U ON 1/24

## 2023-01-23 ENCOUNTER — TELEPHONE (OUTPATIENT)
Dept: FAMILY MEDICINE CLINIC | Facility: CLINIC | Age: 63
End: 2023-01-23
Payer: MEDICARE

## 2023-01-23 RX ORDER — METHOCARBAMOL 750 MG/1
TABLET, FILM COATED ORAL
Qty: 60 TABLET | Refills: 1 | OUTPATIENT
Start: 2023-01-23

## 2023-01-23 NOTE — TELEPHONE ENCOUNTER
Caller: Cindy Bella A    Relationship to patient: Self    Best call back number: [phone]    Patient is needing: PATIENT CALLED IN AND SAID SHE WAS EXPOSED TO COVID ON 1/18/2023 AND 1/19/2023 AND IS WANTING TO HAVE A CALL BACK WITH GUIDANCE AS TO IF SHE SHOULD KEEP HER APPOINTMENT FOR TOMORROW. SHE SAID SHE IS TO BE SEEN FOR PASSING OUT AT HER HOUSE

## 2023-01-23 NOTE — TELEPHONE ENCOUNTER
Advised pt ok to keep appt. Can call the office on arrival and plan to stay in car. Pt will park in the back.

## 2023-01-24 ENCOUNTER — OFFICE VISIT (OUTPATIENT)
Dept: FAMILY MEDICINE CLINIC | Facility: CLINIC | Age: 63
End: 2023-01-24
Payer: MEDICARE

## 2023-01-24 VITALS
OXYGEN SATURATION: 97 % | WEIGHT: 90 LBS | BODY MASS INDEX: 15.95 KG/M2 | HEART RATE: 53 BPM | SYSTOLIC BLOOD PRESSURE: 126 MMHG | DIASTOLIC BLOOD PRESSURE: 54 MMHG | HEIGHT: 63 IN

## 2023-01-24 DIAGNOSIS — R55 SYNCOPE, UNSPECIFIED SYNCOPE TYPE: ICD-10-CM

## 2023-01-24 DIAGNOSIS — R06.02 SHORTNESS OF BREATH: ICD-10-CM

## 2023-01-24 DIAGNOSIS — R42 DIZZINESS: ICD-10-CM

## 2023-01-24 DIAGNOSIS — Z20.822 EXPOSURE TO COVID-19 VIRUS: Primary | ICD-10-CM

## 2023-01-24 DIAGNOSIS — I10 ESSENTIAL HYPERTENSION: ICD-10-CM

## 2023-01-24 DIAGNOSIS — R07.89 CHEST TIGHTNESS: ICD-10-CM

## 2023-01-24 LAB
EXPIRATION DATE: NORMAL
FLUAV AG UPPER RESP QL IA.RAPID: NOT DETECTED
FLUBV AG UPPER RESP QL IA.RAPID: NOT DETECTED
INTERNAL CONTROL: NORMAL
Lab: NORMAL
SARS-COV-2 AG UPPER RESP QL IA.RAPID: NOT DETECTED

## 2023-01-24 PROCEDURE — 99214 OFFICE O/P EST MOD 30 MIN: CPT | Performed by: NURSE PRACTITIONER

## 2023-01-24 PROCEDURE — 87428 SARSCOV & INF VIR A&B AG IA: CPT | Performed by: NURSE PRACTITIONER

## 2023-01-24 PROCEDURE — 93000 ELECTROCARDIOGRAM COMPLETE: CPT | Performed by: NURSE PRACTITIONER

## 2023-01-24 NOTE — PROGRESS NOTES
Chief Complaint  Exposure To Known Illness, Dizziness, and Syncope    SUBJECTIVE  Cindy Bella presents to [facility]     Pt reports having a dizzy/ syncope spell on 01/20/23 at home. States she told her  she didn't feel well, felt a bit nauseated, states her  told her to go lay down and when he returned she was in the floor by the bed. Pt reports that she doesn't remember passing out, just remembers waking up in the floor. States after that she slept for a while and work up feeling better.     states that since then she has been having a bit of SOB, also having some chest tightness with exertion, no palpitations, no pain    Denies any hx of heart attack or stroke   Pt admits to some chest tightness with exertion   States she has cut back on smoking to a pack lasting about a day and a half.     Denies any current chest pain or SOB     Pt reports she was exposed to covid prior to the syncope episode, she has no symptoms of COVID, had negative home test    Patient denies any continued symptoms other than occasional dizziness, has had no further syncopal or near syncopal episodes,  Denies any strokelike symptoms.      History of Present Illness  Past Medical History:   Diagnosis Date   • Anxiety    • Arthritis    • Brain neoplasm malignant (HCC)    • Depression    • Fibromyalgia, primary    • HL (hearing loss)    • Hyperlipidemia    • Hypertension    • Irritable bowel syndrome    • Low back pain    • Scoliosis    • SVT (supraventricular tachycardia) (HCC)    • Urethral stricture 07/23/2015      Family History   Problem Relation Age of Onset   • Hypertension Mother    • Stroke Mother    • Kidney disease Mother    • Hypertension Sister    • Stroke Sister    • Hypertension Brother    • Hypertension Maternal Grandmother    • Hypertension Maternal Grandfather    • Hypertension Paternal Grandmother         Cerebral hemorrhage   • Breast cancer Other    • Pancreatic cancer  Other    • Thyroid cancer Other         Gland   • Hyperlipidemia Other    • Cancer Other    • Coronary artery disease Other    • Hypertension Other    • Stroke Other    • Hypertension Sister    • Other Sister         Breast Cancer   • Hypertension Sister    • Stroke Sister    • Hypertension Brother    • Other Maternal Aunt    • Other Sister       Past Surgical History:   Procedure Laterality Date   • APPENDECTOMY     • BREAST SURGERY     • CARDIAC SURGERY     •  SECTION     • COLONOSCOPY     • OOPHORECTOMY Right    • UPPER GASTROINTESTINAL ENDOSCOPY          Current Outpatient Medications:   •  albuterol sulfate  (90 Base) MCG/ACT inhaler, INHALE TWO PUFFS BY MOUTH EVERY 4 HOURS AS NEEDED FOR WHEEZING (BULK), Disp: 20.1 g, Rfl: 0  •  amLODIPine (NORVASC) 2.5 MG tablet, Take 1 tablet by mouth Daily., Disp: 90 tablet, Rfl: 1  •  Aspirin Low Dose 81 MG EC tablet, TAKE ONE TABLET BY MOUTH DAILY AT 9 AM (VIAL), Disp: 90 tablet, Rfl: 1  •  busPIRone (BUSPAR) 10 MG tablet, Take 1 tablet by mouth 2 (Two) Times a Day., Disp: 180 tablet, Rfl: 1  •  citalopram (CeleXA) 40 MG tablet, Take 1 tablet by mouth Daily., Disp: 90 tablet, Rfl: 1  •  dicyclomine (BENTYL) 10 MG capsule, TAKE TWO CAPSULES BY MOUTH FOUR TIMES DAILY BEFORE MEALS & AT BEDTIME (VIAL), Disp: 120 capsule, Rfl: 2  •  ipratropium-albuterol (DUO-NEB) 0.5-2.5 mg/3 ml nebulizer, Take 3 mL by nebulization 3 (Three) Times a Day As Needed for Wheezing., Disp: 810 mL, Rfl: 1  •  losartan-hydrochlorothiazide (HYZAAR) 50-12.5 MG per tablet, Take 2 tablets by mouth Daily., Disp: 180 tablet, Rfl: 1  •  methocarbamol (ROBAXIN) 750 MG tablet, Take 1 tablet by mouth 3 (Three) Times a Day As Needed for Muscle Spasms., Disp: 60 tablet, Rfl: 1  •  Multiple Vitamin (multivitamin) capsule, Take 1 capsule by mouth Daily., Disp: 30 capsule, Rfl: 11  •  omeprazole (priLOSEC) 20 MG capsule, TAKE TWO CAPSULES BY MOUTH DAILY (VIAL), Disp: 30 capsule, Rfl: 2  •  ondansetron  "ODT (ZOFRAN-ODT) 4 MG disintegrating tablet, Place 0.25 tablets on the tongue As Needed for Nausea or Vomiting., Disp: 90 tablet, Rfl: 1  •  propranolol (INDERAL) 80 MG tablet, Take 1 tablet by mouth 2 (Two) Times a Day., Disp: 180 tablet, Rfl: 1  •  rosuvastatin (CRESTOR) 5 MG tablet, Take 1 tablet by mouth Daily., Disp: 90 tablet, Rfl: 0  •  Tiotropium Bromide Monohydrate (Spiriva Respimat) 1.25 MCG/ACT aerosol solution inhaler, Inhale 2 puffs Daily., Disp: 12 g, Rfl: 2  •  traZODone (DESYREL) 100 MG tablet, Take 1 tablet by mouth Daily., Disp: 90 tablet, Rfl: 1    OBJECTIVE  Vital Signs:   /54   Pulse 53   Ht 160 cm (63\")   Wt 40.8 kg (90 lb)   SpO2 97%   BMI 15.94 kg/m²    Estimated body mass index is 15.94 kg/m² as calculated from the following:    Height as of this encounter: 160 cm (63\").    Weight as of this encounter: 40.8 kg (90 lb).     Wt Readings from Last 3 Encounters:   01/24/23 40.8 kg (90 lb)   01/05/23 41.3 kg (91 lb)   10/27/22 41.3 kg (91 lb)     BP Readings from Last 3 Encounters:   01/24/23 126/54   01/05/23 110/62   10/27/22 144/68       Physical Exam  Vitals reviewed.   Constitutional:       Appearance: Normal appearance. She is well-developed.   HENT:      Head: Normocephalic and atraumatic.      Right Ear: External ear normal.      Left Ear: External ear normal.   Eyes:      Conjunctiva/sclera: Conjunctivae normal.      Pupils: Pupils are equal, round, and reactive to light.   Cardiovascular:      Rate and Rhythm: Normal rate and regular rhythm.      Heart sounds: No murmur heard.    No friction rub. No gallop.   Pulmonary:      Effort: Pulmonary effort is normal.      Breath sounds: Normal breath sounds. No wheezing or rhonchi.   Skin:     General: Skin is warm and dry.   Neurological:      Mental Status: She is alert and oriented to person, place, and time.      Cranial Nerves: No cranial nerve deficit.   Psychiatric:         Mood and Affect: Mood and affect normal.         " Behavior: Behavior normal.         Thought Content: Thought content normal.         Judgment: Judgment normal.          Result Review                          No Images in the past 120 days found..     The above data has been reviewed by BROOKLYN Bowen 01/24/2023 13:27 EST.      ECG 12 Lead    Date/Time: 1/24/2023 3:37 PM  Performed by: Tina Hartley APRN  Authorized by: Tina Hartley APRN   Comparison: not compared with previous ECG   Rhythm: sinus bradycardia    Clinical impression: abnormal EKG  Comments: Sinus bradycardia             Patient Care Team:  Tina Hartley APRN as PCP - General (Nurse Practitioner)         ASSESSMENT & PLAN    Diagnoses and all orders for this visit:    1. Exposure to COVID-19 virus (Primary)  -     POCT SARS-CoV-2 Antigen JONG + Flu    2. Dizziness  -     Duplex Carotid Ultrasound CAR; Future  -     CT Head With & Without Contrast; Future  -     ECG 12 Lead    3. Syncope, unspecified syncope type  -     CT Head With & Without Contrast; Future  -     ECG 12 Lead    4. Chest tightness  -     ECG 12 Lead    5. Shortness of breath  -     XR Chest 2 View; Future  -     ECG 12 Lead    6. Essential hypertension  Assessment & Plan:  Well-controlled at present, continue current medication       Urgent referral placed for carotid Doppler and CT.    Discussed with patient if she has new or worsening symptoms or recurrence of syncopal episodes she is to seek emergent reevaluation  Tobacco Use: Medium Risk   • Smoking Tobacco Use: Former   • Smokeless Tobacco Use: Never   • Passive Exposure: Not on file       Follow Up     Return in about 4 weeks (around 2/21/2023), or if symptoms worsen or fail to improve.        Patient was given instructions and counseling regarding her condition or for health maintenance advice. Please see specific information pulled into the AVS if appropriate.   I have reviewed information obtained and documented by others and I have confirmed  the accuracy of this documented note.    Tina Hartley, APRN

## 2023-01-25 ENCOUNTER — TELEPHONE (OUTPATIENT)
Dept: FAMILY MEDICINE CLINIC | Facility: CLINIC | Age: 63
End: 2023-01-25
Payer: MEDICARE

## 2023-01-27 ENCOUNTER — TELEPHONE (OUTPATIENT)
Dept: FAMILY MEDICINE CLINIC | Facility: CLINIC | Age: 63
End: 2023-01-27
Payer: MEDICARE

## 2023-01-27 ENCOUNTER — PATIENT MESSAGE (OUTPATIENT)
Dept: FAMILY MEDICINE CLINIC | Facility: CLINIC | Age: 63
End: 2023-01-27
Payer: MEDICARE

## 2023-01-27 NOTE — TELEPHONE ENCOUNTER
Phoned patient  and she just wanted to know that she needs to be fasting for her lab work.  The patient states she was to have a new cholesterol medication due to the Crestor causing muscle aches, she stated that you was going to call in another medication for her.  [facility] [place]

## 2023-01-31 NOTE — TELEPHONE ENCOUNTER
Yes patient needs to be fasting, send Rx for atorvastatin 10 mg 1 tab p.o. nightly #90 with 1 refill.

## 2023-01-31 NOTE — TELEPHONE ENCOUNTER
Pt due labs then can send meds if needed, spoke with Tina after last encounter made. Pt notified via [facility].

## 2023-02-02 ENCOUNTER — HOSPITAL ENCOUNTER (OUTPATIENT)
Dept: CARDIOLOGY | Facility: HOSPITAL | Age: 63
Discharge: HOME | End: 2023-02-02
Admitting: NURSE PRACTITIONER
Payer: MEDICARE

## 2023-02-02 DIAGNOSIS — R42 DIZZINESS: ICD-10-CM

## 2023-02-02 LAB
BH CV XLRA MEAS LEFT CAROTID BULB EDV: 25 CM/SEC
BH CV XLRA MEAS LEFT CAROTID BULB PSV: 73 CM/SEC
BH CV XLRA MEAS LEFT DIST CCA EDV: 35 CM/SEC
BH CV XLRA MEAS LEFT DIST CCA PSV: 95 CM/SEC
BH CV XLRA MEAS LEFT DIST ICA EDV: 35 CM/SEC
BH CV XLRA MEAS LEFT DIST ICA PSV: 81 CM/SEC
BH CV XLRA MEAS LEFT MID ICA EDV: 31 CM/SEC
BH CV XLRA MEAS LEFT MID ICA PSV: 78 CM/SEC
BH CV XLRA MEAS LEFT PROX CCA EDV: 27 CM/SEC
BH CV XLRA MEAS LEFT PROX CCA PSV: 83 CM/SEC
BH CV XLRA MEAS LEFT PROX ECA EDV: 17 CM/SEC
BH CV XLRA MEAS LEFT PROX ECA PSV: 78 CM/SEC
BH CV XLRA MEAS LEFT PROX ICA EDV: 37 CM/SEC
BH CV XLRA MEAS LEFT PROX ICA PSV: 85 CM/SEC
BH CV XLRA MEAS LEFT VERTEBRAL A EDV: 14 CM/SEC
BH CV XLRA MEAS LEFT VERTEBRAL A PSV: 47 CM/SEC
BH CV XLRA MEAS RIGHT CAROTID BULB EDV: 14 CM/SEC
BH CV XLRA MEAS RIGHT CAROTID BULB PSV: 58 CM/SEC
BH CV XLRA MEAS RIGHT DIST CCA EDV: 23 CM/SEC
BH CV XLRA MEAS RIGHT DIST CCA PSV: 68 CM/SEC
BH CV XLRA MEAS RIGHT DIST ICA EDV: 40 CM/SEC
BH CV XLRA MEAS RIGHT DIST ICA PSV: 93 CM/SEC
BH CV XLRA MEAS RIGHT MID ICA EDV: 24 CM/SEC
BH CV XLRA MEAS RIGHT MID ICA PSV: 75 CM/SEC
BH CV XLRA MEAS RIGHT PROX CCA EDV: 27 CM/SEC
BH CV XLRA MEAS RIGHT PROX CCA PSV: 76 CM/SEC
BH CV XLRA MEAS RIGHT PROX ECA EDV: 18 CM/SEC
BH CV XLRA MEAS RIGHT PROX ECA PSV: 93 CM/SEC
BH CV XLRA MEAS RIGHT PROX ICA EDV: 32 CM/SEC
BH CV XLRA MEAS RIGHT PROX ICA PSV: 79 CM/SEC
BH CV XLRA MEAS RIGHT VERTEBRAL A EDV: 13 CM/SEC
BH CV XLRA MEAS RIGHT VERTEBRAL A PSV: 37 CM/SEC
LEFT ARM BP: NORMAL MMHG
MAXIMAL PREDICTED HEART RATE: 157 BPM
RIGHT ARM BP: NORMAL MMHG
STRESS TARGET HR: 133 BPM

## 2023-02-02 PROCEDURE — 93880 EXTRACRANIAL BILAT STUDY: CPT | Performed by: SURGERY

## 2023-02-02 PROCEDURE — 93880 EXTRACRANIAL BILAT STUDY: CPT

## 2023-02-03 DIAGNOSIS — K21.9 GASTROESOPHAGEAL REFLUX DISEASE, UNSPECIFIED WHETHER ESOPHAGITIS PRESENT: ICD-10-CM

## 2023-02-06 RX ORDER — OMEPRAZOLE 20 MG/1
CAPSULE, DELAYED RELEASE ORAL
Qty: 30 CAPSULE | Refills: 2 | Status: SHIPPED | OUTPATIENT
Start: 2023-02-06 | End: 2023-03-06

## 2023-02-17 ENCOUNTER — HOSPITAL ENCOUNTER (OUTPATIENT)
Dept: CT IMAGING | Facility: HOSPITAL | Age: 63
Discharge: HOME | End: 2023-02-17
Admitting: NURSE PRACTITIONER
Payer: MEDICARE

## 2023-02-17 DIAGNOSIS — R42 DIZZINESS: ICD-10-CM

## 2023-02-17 DIAGNOSIS — R55 SYNCOPE, UNSPECIFIED SYNCOPE TYPE: ICD-10-CM

## 2023-02-17 LAB
CREAT BLDA-MCNC: 0.7 MG/DL
EGFRCR SERPLBLD CKD-EPI 2021: 97.3 ML/MIN/1.73

## 2023-02-17 PROCEDURE — 70470 CT HEAD/BRAIN W/O & W/DYE: CPT

## 2023-02-17 PROCEDURE — 0 IOPAMIDOL PER 1 ML: Performed by: NURSE PRACTITIONER

## 2023-02-17 PROCEDURE — 82565 ASSAY OF CREATININE: CPT

## 2023-02-17 RX ADMIN — IOPAMIDOL 50 ML: 755 INJECTION, SOLUTION INTRAVENOUS at 14:21

## 2023-03-02 ENCOUNTER — TELEPHONE (OUTPATIENT)
Dept: FAMILY MEDICINE CLINIC | Facility: CLINIC | Age: 63
End: 2023-03-02
Payer: MEDICARE

## 2023-03-02 NOTE — TELEPHONE ENCOUNTER
Caller: Cindy Bella    Relationship: Self    Best call back number: [phone]    What is the best time to reach you: ANY    Who are you requesting to speak with (clinical staff, provider,  specific staff member): CLINICAL    What was the call regarding: PATIENT WOULD LIKE A CALL BACK TO DISCUSS A MEDICAL MARIJUANA PRESCRIPTION.     Do you require a callback: YES

## 2023-03-04 DIAGNOSIS — K21.9 GASTROESOPHAGEAL REFLUX DISEASE, UNSPECIFIED WHETHER ESOPHAGITIS PRESENT: ICD-10-CM

## 2023-03-06 DIAGNOSIS — R15.2 FECAL URGENCY: ICD-10-CM

## 2023-03-06 DIAGNOSIS — R19.7 DIARRHEA, UNSPECIFIED TYPE: ICD-10-CM

## 2023-03-06 RX ORDER — DICYCLOMINE HYDROCHLORIDE 10 MG/1
CAPSULE ORAL
Qty: 120 CAPSULE | Refills: 2 | Status: SHIPPED | OUTPATIENT
Start: 2023-03-06

## 2023-03-06 RX ORDER — OMEPRAZOLE 20 MG/1
CAPSULE, DELAYED RELEASE ORAL
Qty: 90 CAPSULE | Refills: 1 | Status: SHIPPED | OUTPATIENT
Start: 2023-03-06

## 2023-04-04 DIAGNOSIS — F41.9 ANXIETY: ICD-10-CM

## 2023-04-04 DIAGNOSIS — I10 ESSENTIAL HYPERTENSION: ICD-10-CM

## 2023-04-04 DIAGNOSIS — E78.2 MIXED HYPERLIPIDEMIA: ICD-10-CM

## 2023-04-04 RX ORDER — LOSARTAN POTASSIUM AND HYDROCHLOROTHIAZIDE 12.5; 5 MG/1; MG/1
TABLET ORAL
Qty: 180 TABLET | Refills: 0 | Status: SHIPPED | OUTPATIENT
Start: 2023-04-04

## 2023-04-04 RX ORDER — BUSPIRONE HYDROCHLORIDE 10 MG/1
TABLET ORAL
Qty: 180 TABLET | Refills: 0 | Status: SHIPPED | OUTPATIENT
Start: 2023-04-04

## 2023-04-04 RX ORDER — ALBUTEROL SULFATE 90 UG/1
AEROSOL, METERED RESPIRATORY (INHALATION)
Qty: 20.1 G | Refills: 0 | Status: SHIPPED | OUTPATIENT
Start: 2023-04-04

## 2023-04-04 RX ORDER — CITALOPRAM 40 MG/1
TABLET ORAL
Qty: 90 TABLET | Refills: 0 | Status: SHIPPED | OUTPATIENT
Start: 2023-04-04

## 2023-04-04 RX ORDER — ROSUVASTATIN CALCIUM 5 MG/1
TABLET, COATED ORAL
Qty: 90 TABLET | Refills: 0 | Status: SHIPPED | OUTPATIENT
Start: 2023-04-04

## 2023-04-04 RX ORDER — AMLODIPINE BESYLATE 2.5 MG/1
TABLET ORAL
Qty: 90 TABLET | Refills: 0 | Status: SHIPPED | OUTPATIENT
Start: 2023-04-04

## 2023-04-12 ENCOUNTER — HOSPITAL ENCOUNTER (EMERGENCY)
Facility: HOSPITAL | Age: 63
Discharge: HOME | End: 2023-04-12
Attending: EMERGENCY MEDICINE | Admitting: EMERGENCY MEDICINE
Payer: MEDICARE

## 2023-04-12 ENCOUNTER — APPOINTMENT (OUTPATIENT)
Dept: GENERAL RADIOLOGY | Facility: HOSPITAL | Age: 63
End: 2023-04-12
Payer: MEDICARE

## 2023-04-12 VITALS
WEIGHT: 92.59 LBS | SYSTOLIC BLOOD PRESSURE: 141 MMHG | HEART RATE: 81 BPM | OXYGEN SATURATION: 94 % | RESPIRATION RATE: 22 BRPM | BODY MASS INDEX: 16.41 KG/M2 | HEIGHT: 63 IN | DIASTOLIC BLOOD PRESSURE: 92 MMHG | TEMPERATURE: 98.4 F

## 2023-04-12 DIAGNOSIS — J44.1 ACUTE EXACERBATION OF CHRONIC OBSTRUCTIVE PULMONARY DISEASE (COPD): Primary | ICD-10-CM

## 2023-04-12 LAB
ALBUMIN SERPL-MCNC: 4.2 G/DL (ref 3.5–5.2)
ALBUMIN/GLOB SERPL: 1.2 G/DL
ALP SERPL-CCNC: 97 U/L (ref 39–117)
ALT SERPL W P-5'-P-CCNC: 14 U/L (ref 1–33)
ANION GAP SERPL CALCULATED.3IONS-SCNC: 8.6 MMOL/L (ref 5–15)
AST SERPL-CCNC: 17 U/L (ref 1–32)
BASOPHILS # BLD AUTO: 0.06 10*3/MM3 (ref 0–0.2)
BASOPHILS NFR BLD AUTO: 0.4 % (ref 0–1.5)
BILIRUB SERPL-MCNC: 0.4 MG/DL (ref 0–1.2)
BUN SERPL-MCNC: 12 MG/DL (ref 8–23)
BUN/CREAT SERPL: 19.7 (ref 7–25)
CALCIUM SPEC-SCNC: 10.7 MG/DL (ref 8.6–10.5)
CHLORIDE SERPL-SCNC: 100 MMOL/L (ref 98–107)
CO2 SERPL-SCNC: 32.4 MMOL/L (ref 22–29)
CREAT SERPL-MCNC: 0.61 MG/DL (ref 0.57–1)
D-LACTATE SERPL-SCNC: 1 MMOL/L (ref 0.5–2)
DEPRECATED RDW RBC AUTO: 42.5 FL (ref 37–54)
EGFRCR SERPLBLD CKD-EPI 2021: 100.6 ML/MIN/1.73
EOSINOPHIL # BLD AUTO: 0.15 10*3/MM3 (ref 0–0.4)
EOSINOPHIL NFR BLD AUTO: 0.9 % (ref 0.3–6.2)
ERYTHROCYTE [DISTWIDTH] IN BLOOD BY AUTOMATED COUNT: 12.5 % (ref 12.3–15.4)
GLOBULIN UR ELPH-MCNC: 3.4 GM/DL
GLUCOSE SERPL-MCNC: 107 MG/DL (ref 65–99)
HCT VFR BLD AUTO: 43.6 % (ref 34–46.6)
HGB BLD-MCNC: 14.9 G/DL (ref 12–15.9)
HOLD SPECIMEN: NORMAL
HOLD SPECIMEN: NORMAL
IMM GRANULOCYTES # BLD AUTO: 0.05 10*3/MM3 (ref 0–0.05)
IMM GRANULOCYTES NFR BLD AUTO: 0.3 % (ref 0–0.5)
LYMPHOCYTES # BLD AUTO: 3.14 10*3/MM3 (ref 0.7–3.1)
LYMPHOCYTES NFR BLD AUTO: 18.4 % (ref 19.6–45.3)
MCH RBC QN AUTO: 31.2 PG (ref 26.6–33)
MCHC RBC AUTO-ENTMCNC: 34.2 G/DL (ref 31.5–35.7)
MCV RBC AUTO: 91.4 FL (ref 79–97)
MONOCYTES # BLD AUTO: 1.09 10*3/MM3 (ref 0.1–0.9)
MONOCYTES NFR BLD AUTO: 6.4 % (ref 5–12)
NEUTROPHILS NFR BLD AUTO: 12.59 10*3/MM3 (ref 1.7–7)
NEUTROPHILS NFR BLD AUTO: 73.6 % (ref 42.7–76)
NRBC BLD AUTO-RTO: 0 /100 WBC (ref 0–0.2)
NT-PROBNP SERPL-MCNC: 138.4 PG/ML (ref 0–900)
PLATELET # BLD AUTO: 402 10*3/MM3 (ref 140–450)
PMV BLD AUTO: 9.1 FL (ref 6–12)
POTASSIUM SERPL-SCNC: 3.1 MMOL/L (ref 3.5–5.2)
PROT SERPL-MCNC: 7.6 G/DL (ref 6–8.5)
QT INTERVAL: 397 MS
RBC # BLD AUTO: 4.77 10*6/MM3 (ref 3.77–5.28)
SODIUM SERPL-SCNC: 141 MMOL/L (ref 136–145)
TROPONIN T SERPL HS-MCNC: <6 NG/L
WBC NRBC COR # BLD: 17.08 10*3/MM3 (ref 3.4–10.8)
WHOLE BLOOD HOLD COAG: NORMAL
WHOLE BLOOD HOLD SPECIMEN: NORMAL

## 2023-04-12 PROCEDURE — 84484 ASSAY OF TROPONIN QUANT: CPT | Performed by: EMERGENCY MEDICINE

## 2023-04-12 PROCEDURE — 99284 EMERGENCY DEPT VISIT MOD MDM: CPT

## 2023-04-12 PROCEDURE — 94640 AIRWAY INHALATION TREATMENT: CPT

## 2023-04-12 PROCEDURE — 83880 ASSAY OF NATRIURETIC PEPTIDE: CPT | Performed by: EMERGENCY MEDICINE

## 2023-04-12 PROCEDURE — 94799 UNLISTED PULMONARY SVC/PX: CPT

## 2023-04-12 PROCEDURE — 80053 COMPREHEN METABOLIC PANEL: CPT | Performed by: EMERGENCY MEDICINE

## 2023-04-12 PROCEDURE — 83605 ASSAY OF LACTIC ACID: CPT | Performed by: EMERGENCY MEDICINE

## 2023-04-12 PROCEDURE — 85025 COMPLETE CBC W/AUTO DIFF WBC: CPT | Performed by: EMERGENCY MEDICINE

## 2023-04-12 PROCEDURE — 87040 BLOOD CULTURE FOR BACTERIA: CPT | Performed by: EMERGENCY MEDICINE

## 2023-04-12 PROCEDURE — 71045 X-RAY EXAM CHEST 1 VIEW: CPT

## 2023-04-12 PROCEDURE — 93010 ELECTROCARDIOGRAM REPORT: CPT | Performed by: SPECIALIST

## 2023-04-12 PROCEDURE — 96374 THER/PROPH/DIAG INJ IV PUSH: CPT

## 2023-04-12 PROCEDURE — 25010000002 METHYLPREDNISOLONE PER 125 MG: Performed by: EMERGENCY MEDICINE

## 2023-04-12 PROCEDURE — 93005 ELECTROCARDIOGRAM TRACING: CPT | Performed by: EMERGENCY MEDICINE

## 2023-04-12 RX ORDER — SODIUM CHLORIDE 0.9 % (FLUSH) 0.9 %
10 SYRINGE (ML) INJECTION AS NEEDED
Status: DISCONTINUED | OUTPATIENT
Start: 2023-04-12 | End: 2023-04-12 | Stop reason: HOSPADM

## 2023-04-12 RX ORDER — IPRATROPIUM BROMIDE AND ALBUTEROL SULFATE 2.5; .5 MG/3ML; MG/3ML
3 SOLUTION RESPIRATORY (INHALATION)
Status: COMPLETED | OUTPATIENT
Start: 2023-04-12 | End: 2023-04-12

## 2023-04-12 RX ORDER — ACETAMINOPHEN 325 MG/1
975 TABLET ORAL ONCE
Status: COMPLETED | OUTPATIENT
Start: 2023-04-12 | End: 2023-04-12

## 2023-04-12 RX ORDER — METHYLPREDNISOLONE SODIUM SUCCINATE 125 MG/2ML
125 INJECTION, POWDER, LYOPHILIZED, FOR SOLUTION INTRAMUSCULAR; INTRAVENOUS ONCE
Status: COMPLETED | OUTPATIENT
Start: 2023-04-12 | End: 2023-04-12

## 2023-04-12 RX ORDER — PREDNISONE 50 MG/1
50 TABLET ORAL DAILY
Qty: 4 TABLET | Refills: 0 | Status: SHIPPED | OUTPATIENT
Start: 2023-04-12

## 2023-04-12 RX ORDER — ALBUTEROL SULFATE 0.63 MG/3ML
1 SOLUTION RESPIRATORY (INHALATION) EVERY 6 HOURS PRN
Qty: 30 EACH | Refills: 0 | Status: SHIPPED | OUTPATIENT
Start: 2023-04-12

## 2023-04-12 RX ADMIN — ACETAMINOPHEN 975 MG: 325 TABLET ORAL at 06:33

## 2023-04-12 RX ADMIN — IPRATROPIUM BROMIDE AND ALBUTEROL SULFATE 3 ML: 2.5; .5 SOLUTION RESPIRATORY (INHALATION) at 05:00

## 2023-04-12 RX ADMIN — IPRATROPIUM BROMIDE AND ALBUTEROL SULFATE 3 ML: 2.5; .5 SOLUTION RESPIRATORY (INHALATION) at 05:01

## 2023-04-12 RX ADMIN — METHYLPREDNISOLONE SODIUM SUCCINATE 125 MG: 125 INJECTION INTRAMUSCULAR; INTRAVENOUS at 05:01

## 2023-04-12 RX ADMIN — IPRATROPIUM BROMIDE AND ALBUTEROL SULFATE 3 ML: 2.5; .5 SOLUTION RESPIRATORY (INHALATION) at 04:59

## 2023-04-12 NOTE — ED PROVIDER NOTES
Time: 4:42 AM EDT  Date of encounter:  2023  Independent Historian/Clinical History and Information was obtained by:   Patient  Chief Complaint: Shortness of breath    History is limited by: N/A    History of Present Illness:  Patient is a 62 y.o. year old female who presents to the emergency department for evaluation of shortness of breath    Patient states for greater than 1 week she has had increasing difficulty in breathing.  She has attempted to use her nebulizer and inhalers at home with little relief.  Occasionally its been minimally productive of clear sputum.  She has had no fevers or chills.  She has some tightness in her chest but no pain.      HPI    Patient Care Team  Primary Care Provider: Tina Hartley APRN    Past Medical History:     Allergies   Allergen Reactions   • Sumatriptan Shortness Of Breath   • Latex Itching   • Levofloxacin Nausea Only   • Nsaids Itching   • Amoxicillin-Pot Clavulanate Rash   • Pseudoephedrine Rash     Past Medical History:   Diagnosis Date   • Anxiety    • Arthritis    • Brain neoplasm malignant    • Depression    • Fibromyalgia, primary    • HL (hearing loss)    • Hyperlipidemia    • Hypertension    • Irritable bowel syndrome    • Low back pain    • Scoliosis    • SVT (supraventricular tachycardia)    • Urethral stricture 2015     Past Surgical History:   Procedure Laterality Date   • APPENDECTOMY     • BREAST SURGERY     • CARDIAC SURGERY     •  SECTION     • COLONOSCOPY     • OOPHORECTOMY Right    • UPPER GASTROINTESTINAL ENDOSCOPY       Family History   Problem Relation Age of Onset   • Hypertension Mother    • Stroke Mother    • Kidney disease Mother    • Hypertension Sister    • Stroke Sister    • Hypertension Brother    • Hypertension Maternal Grandmother    • Hypertension Maternal Grandfather    • Hypertension Paternal Grandmother         Cerebral hemorrhage   • Breast cancer Other    • Pancreatic cancer Other    • Thyroid cancer Other          Gland   • Hyperlipidemia Other    • Cancer Other    • Coronary artery disease Other    • Hypertension Other    • Stroke Other    • Hypertension Sister    • Other Sister         Breast Cancer   • Hypertension Sister    • Stroke Sister    • Hypertension Brother    • Other Maternal Aunt    • Other Sister        Home Medications:  Prior to Admission medications    Medication Sig Start Date End Date Taking? Authorizing Provider   albuterol sulfate  (90 Base) MCG/ACT inhaler INHALE TWO PUFFS BY MOUTH EVERY 4 HOURS AS NEEDED FOR WHEEZING (BULK) 4/4/23   Tina Hartley APRN   amLODIPine (NORVASC) 2.5 MG tablet TAKE ONE TABLET BY MOUTH DAILY AT 9 AM (VIAL) 4/4/23   Tina Hartley APRN   Aspirin Low Dose 81 MG EC tablet TAKE ONE TABLET BY MOUTH DAILY AT 9 AM (VIAL) 1/5/23   Tina Hartley APRN   busPIRone (BUSPAR) 10 MG tablet TAKE ONE TABLET BY MOUTH TWICE DAILY (VIAL) 4/4/23   Tina Hartley APRN   citalopram (CeleXA) 40 MG tablet TAKE ONE TABLET BY MOUTH DAILY AT 9 AM (VIAL) 4/4/23   Tina Hartley APRN   dicyclomine (BENTYL) 10 MG capsule TAKE TWO CAPSULES BY MOUTH FOUR TIMES DAILY BEFORE MEALS & AT BEDTIME (VIAL) 3/6/23   Tina Hartley APRN   ipratropium-albuterol (DUO-NEB) 0.5-2.5 mg/3 ml nebulizer Take 3 mL by nebulization 3 (Three) Times a Day As Needed for Wheezing. 9/22/22   Tina Hartley APRN   losartan-hydrochlorothiazide (HYZAAR) 50-12.5 MG per tablet TAKE TWO TABLETS BY MOUTH EVERY DAY (VIAL) 4/4/23   Tina Hartley APRN   methocarbamol (ROBAXIN) 750 MG tablet Take 1 tablet by mouth 3 (Three) Times a Day As Needed for Muscle Spasms. 1/3/23   Tina Hartley APRN   Multiple Vitamin (multivitamin) capsule Take 1 capsule by mouth Daily. 9/22/22 9/22/23  Tina Hartley APRN   omeprazole (priLOSEC) 20 MG capsule TAKE TWO CAPSULES BY MOUTH DAILY (VIAL) 3/6/23   Tina Hartley APRN   ondansetron ODT (ZOFRAN-ODT) 4 MG disintegrating tablet  "Place 0.25 tablets on the tongue As Needed for Nausea or Vomiting. 22   Tina Hartley APRN   propranolol (INDERAL) 80 MG tablet Take 1 tablet by mouth 2 (Two) Times a Day. 22   Tina Hartley APRN   rosuvastatin (CRESTOR) 5 MG tablet TAKE ONE TABLET BY MOUTH EVERY DAY (VIAL) 23   Tina Hartley APRN   Tiotropium Bromide Monohydrate (Spiriva Respimat) 1.25 MCG/ACT aerosol solution inhaler Inhale 2 puffs Daily. 22   Tina Hartley APRN   traZODone (DESYREL) 100 MG tablet Take 1 tablet by mouth Daily. 22   Tina Hartley APRN        Social History:   Social History     Tobacco Use   • Smoking status: Every Day     Packs/day: 1.00     Years: 30.00     Pack years: 30.00     Types: Cigarettes     Start date: 1974     Last attempt to quit: 2021     Years since quittin.4   • Smokeless tobacco: Never   • Tobacco comments:     3/4 ppd   Vaping Use   • Vaping Use: Every day   • Substances: Nicotine   Substance Use Topics   • Alcohol use: Never   • Drug use: Yes     Types: Marijuana         Review of Systems:  Review of Systems   Constitutional: Positive for activity change and fatigue. Negative for chills and fever.   HENT: Positive for congestion. Negative for ear pain and sore throat.    Eyes: Negative for pain.   Respiratory: Positive for cough, chest tightness and shortness of breath.    Cardiovascular: Negative for chest pain.   Gastrointestinal: Negative for abdominal pain, diarrhea, nausea and vomiting.   Genitourinary: Negative for flank pain and hematuria.   Musculoskeletal: Negative for joint swelling.   Skin: Negative for pallor.   Neurological: Negative for seizures and headaches.   All other systems reviewed and are negative.       Physical Exam:  /92   Pulse 81   Temp 98.4 °F (36.9 °C)   Resp 22   Ht 160 cm (63\")   Wt 42 kg (92 lb 9.5 oz)   SpO2 94%   BMI 16.40 kg/m²     Physical Exam  Vitals and nursing note reviewed. "   Constitutional:       General: She is not in acute distress.     Appearance: Normal appearance. She is not toxic-appearing.   HENT:      Head: Normocephalic and atraumatic.      Jaw: There is normal jaw occlusion.   Eyes:      General: Lids are normal.      Extraocular Movements: Extraocular movements intact.      Conjunctiva/sclera: Conjunctivae normal.      Pupils: Pupils are equal, round, and reactive to light.   Cardiovascular:      Rate and Rhythm: Normal rate and regular rhythm.      Pulses: Normal pulses.      Heart sounds: Normal heart sounds.   Pulmonary:      Effort: Pulmonary effort is normal. Tachypnea present. No respiratory distress.      Breath sounds: Decreased breath sounds and wheezing present. No rhonchi.   Abdominal:      General: Abdomen is flat.      Palpations: Abdomen is soft.      Tenderness: There is no abdominal tenderness. There is no guarding or rebound.   Musculoskeletal:         General: Normal range of motion.      Cervical back: Normal range of motion and neck supple.      Right lower leg: No edema.      Left lower leg: No edema.   Skin:     General: Skin is warm and dry.   Neurological:      Mental Status: She is alert and oriented to person, place, and time. Mental status is at baseline.   Psychiatric:         Mood and Affect: Mood normal.                  Procedures:  Procedures      Medical Decision Making:      Comorbidities that affect care:    COPD    External Notes reviewed:    Previous Clinic Note:        The following orders were placed and all results were independently analyzed by me:  Orders Placed This Encounter   Procedures   • Blood Culture - Blood,   • Blood Culture - Blood,   • XR Chest 1 View   • Imperial Draw   • Comprehensive Metabolic Panel   • BNP   • Single High Sensitivity Troponin T   • CBC Auto Differential   • Lactic Acid, Plasma   • NPO Diet NPO Type: Strict NPO   • Undress & Gown   • Cardiac Monitoring   • Continuous Pulse Oximetry   • Vital Signs   •  PO fluids  Misc Nursing Order (Specify)   • Oxygen Therapy- Nasal Cannula; 2 LPM; Titrate for SPO2: equal to or greater than, 92%   • ECG 12 Lead ED Triage Standing Order; SOA   • Insert Peripheral IV   • CBC & Differential   • Green Top (Gel)   • Lavender Top   • Gold Top - SST   • Light Blue Top       Medications Given in the Emergency Department:  Medications   sodium chloride 0.9 % flush 10 mL (has no administration in time range)   ipratropium-albuterol (DUO-NEB) nebulizer solution 3 mL (3 mL Nebulization Given 4/12/23 0501)   methylPREDNISolone sodium succinate (SOLU-Medrol) injection 125 mg (125 mg Intravenous Given 4/12/23 0501)   acetaminophen (TYLENOL) tablet 975 mg (975 mg Oral Given 4/12/23 0633)        ED Course:         Labs:    Lab Results (last 24 hours)     Procedure Component Value Units Date/Time    CBC & Differential [[id number]]  (Abnormal) Collected: 04/12/23 0452    Specimen: Blood Updated: 04/12/23 0515    Narrative:      The following orders were created for panel order CBC & Differential.  Procedure                               Abnormality         Status                     ---------                               -----------         ------                     CBC Auto Differential[[id number]]        Abnormal            Final result                 Please view results for these tests on the individual orders.    Comprehensive Metabolic Panel [[id number]]  (Abnormal) Collected: 04/12/23 0452    Specimen: Blood Updated: 04/12/23 0622     Glucose 107 mg/dL      BUN 12 mg/dL      Creatinine 0.61 mg/dL      Sodium 141 mmol/L      Potassium 3.1 mmol/L      Chloride 100 mmol/L      CO2 32.4 mmol/L      Calcium 10.7 mg/dL      Total Protein 7.6 g/dL      Albumin 4.2 g/dL      ALT (SGPT) 14 U/L      AST (SGOT) 17 U/L      Alkaline Phosphatase 97 U/L      Total Bilirubin 0.4 mg/dL      Globulin 3.4 gm/dL      A/G Ratio 1.2 g/dL      BUN/Creatinine Ratio 19.7     Anion Gap 8.6 mmol/L      eGFR 100.6  mL/min/1.73     Narrative:      GFR Normal >60  Chronic Kidney Disease <60  Kidney Failure <15      BNP [[id number]]  (Normal) Collected: 04/12/23 0452    Specimen: Blood Updated: 04/12/23 0538     proBNP 138.4 pg/mL     Narrative:      Among patients with dyspnea, NT-proBNP is highly sensitive for the detection of acute congestive heart failure. In addition NT-proBNP of <300 pg/ml effectively rules out acute congestive heart failure with 99% negative predictive value.      Single High Sensitivity Troponin T [[id number]]  (Normal) Collected: 04/12/23 0452    Specimen: Blood Updated: 04/12/23 0603     HS Troponin T <6 ng/L     Narrative:      High Sensitive Troponin T Reference Range:  <10.0 ng/L- Negative Female for AMI  <15.0 ng/L- Negative Male for AMI  >=10 - Abnormal Female indicating possible myocardial injury.  >=15 - Abnormal Male indicating possible myocardial injury.   Clinicians would have to utilize clinical acumen, EKG, Troponin, and serial changes to determine if it is an Acute Myocardial Infarction or myocardial injury due to an underlying chronic condition.         CBC Auto Differential [[id number]]  (Abnormal) Collected: 04/12/23 0452    Specimen: Blood Updated: 04/12/23 0515     WBC 17.08 10*3/mm3      RBC 4.77 10*6/mm3      Hemoglobin 14.9 g/dL      Hematocrit 43.6 %      MCV 91.4 fL      MCH 31.2 pg      MCHC 34.2 g/dL      RDW 12.5 %      RDW-SD 42.5 fl      MPV 9.1 fL      Platelets 402 10*3/mm3      Neutrophil % 73.6 %      Lymphocyte % 18.4 %      Monocyte % 6.4 %      Eosinophil % 0.9 %      Basophil % 0.4 %      Immature Grans % 0.3 %      Neutrophils, Absolute 12.59 10*3/mm3      Lymphocytes, Absolute 3.14 10*3/mm3      Monocytes, Absolute 1.09 10*3/mm3      Eosinophils, Absolute 0.15 10*3/mm3      Basophils, Absolute 0.06 10*3/mm3      Immature Grans, Absolute 0.05 10*3/mm3      nRBC 0.0 /100 WBC     Lactic Acid, Plasma [[id number]]  (Normal) Collected: 04/12/23 0452    Specimen: Blood  Updated: 04/12/23 0538     Lactate 1.0 mmol/L     Blood Culture - Blood, Arm, Left [[id number]] Collected: 04/12/23 0452    Specimen: Blood from Arm, Left Updated: 04/12/23 0513    Blood Culture - Blood, Arm, Left [[id number]] Collected: 04/12/23 0452    Specimen: Blood from Arm, Left Updated: 04/12/23 0513           Imaging:    XR Chest 1 View    Result Date: 4/12/2023  PROCEDURE: XR CHEST 1 VW  COMPARISON: 5/3/2022.  INDICATIONS: SHORTNESS OF BREATH.  FINDINGS:  A single AP (or PA) upright portable chest radiograph was performed.  No cardiac enlargement is seen.  No acute infiltrate is appreciated.  No pleural effusion or pneumothorax is identified.  The thoracic aorta is atherosclerotic. Chronic calcified granulomatous disease involves the chest.  External artifacts obscure detail.  Thoracolumbar scoliosis is present, similar to the prior exam.  Overall, no significant interval change is seen since the prior study (or studies).        No acute infiltrate is appreciated.     Please note that portions of this note were completed with a voice recognition program.  ROSELIA IKM JR, MD       Electronically Signed and Approved By: ROSELIA KIM JR, MD on 4/12/2023 at 5:39                  Differential Diagnosis and Discussion:    Dyspnea: Differential diagnosis includes but is not limited to metabolic acidosis, neurological disorders, psychogenic, asthma, pneumothorax, upper airway obstruction, COPD, pneumonia, noncardiogenic pulmonary edema, interstitial lung disease, anemia, congestive heart failure, and pulmonary embolism    All labs were reviewed and interpreted by me.  All X-rays were independently reviewed by me.  EKG was interpreted by me.    MDM         Patient Care Considerations:    CT CHEST: I considered ordering a CT scan of the chest, however Symptoms resolved with nebulizer treatments.      Consultants/Shared Management Plan:    None    Social Determinants of Health:    Patient is independent, reliable,  and has access to care.       Disposition and Care Coordination:    Discharged: I considered escalation of care by admitting this patient for observation, however the patient has improved and is suitable and  stable for discharge.    I have explained the patient´s condition, diagnoses and treatment plan based on the information available to me at this time. I have answered questions and addressed any concerns. The patient has a good  understanding of the patient´s diagnosis, condition, and treatment plan as can be expected at this point. The vital signs have been stable. The patient´s condition is stable and appropriate for discharge from the emergency department.      The patient will pursue further outpatient evaluation with the primary care physician or other designated or consulting physician as outlined in the discharge instructions. They are agreeable to this plan of care and follow-up instructions have been explained in detail. The patient has received these instructions in written format and have expressed an understanding of the discharge instructions. The patient is aware that any significant change in condition or worsening of symptoms should prompt an immediate return to this or the closest emergency department or call to 911.  I have explained discharge medications and the need for follow up with the patient/caretakers. This was also printed in the discharge instructions. Patient was discharged with the following medications and follow up:      Medication List      New Prescriptions    predniSONE 50 MG tablet  Commonly known as: DELTASONE  Take 1 tablet by mouth Daily.        Changed    * albuterol sulfate  (90 Base) MCG/ACT inhaler  Commonly known as: PROVENTIL HFA;VENTOLIN HFA;PROAIR HFA  INHALE TWO PUFFS BY MOUTH EVERY 4 HOURS AS NEEDED FOR WHEEZING (BULK)  What changed: Another medication with the same name was added. Make sure you understand how and when to take each.     * albuterol 0.63  MG/3ML nebulizer solution  Commonly known as: ACCUNEB  Take 3 mL by nebulization Every 6 (Six) Hours As Needed for Wheezing.  What changed: You were already taking a medication with the same name, and this prescription was added. Make sure you understand how and when to take each.     rosuvastatin 5 MG tablet  Commonly known as: CRESTOR  TAKE ONE TABLET BY MOUTH EVERY DAY (VIAL)  What changed: See the new instructions.         * This list has 2 medication(s) that are the same as other medications prescribed for you. Read the directions carefully, and ask your doctor or other care provider to review them with you.               Where to Get Your Medications      These medications were sent to [id number] - [place], [place] - [address] AT [facility] [phone]  - [phone] FX  [address] [place] [address] [address]    Phone: [phone]   · albuterol 0.63 MG/3ML nebulizer solution  · predniSONE 50 MG tablet      Tina Hartley, APRN  [address]  [address]  [place] [address]  [phone]    Schedule an appointment as soon as possible for a visit          Final diagnoses:   Acute exacerbation of chronic obstructive pulmonary disease (COPD)        ED Disposition     ED Disposition   Discharge    Condition   Stable    Comment   --             This medical record created using voice recognition software.           Jose Armijo MD  04/12/23 0887

## 2023-04-13 ENCOUNTER — TELEPHONE (OUTPATIENT)
Dept: FAMILY MEDICINE CLINIC | Facility: CLINIC | Age: 63
End: 2023-04-13
Payer: MEDICARE

## 2023-04-13 NOTE — TELEPHONE ENCOUNTER
Please call patient daughter back, if patient is refusing to go to ER my recommendation would be to go ahead and call an ambulance, an O2 sat that low and those blood pressure readings are critical levels ,  she needs emergent evaluation

## 2023-04-13 NOTE — TELEPHONE ENCOUNTER
Ok per sebastián to have pt come to office to  tubing for neb machine    Some it set aside at the , pt aware and will come in the morning

## 2023-04-13 NOTE — TELEPHONE ENCOUNTER
Patients daughter called stating patient bp was running 204/124-240/147 after starting prednisone that was giving to her at the er, o2 was at 81, worsening copd symptoms, daughter was advised to take patient back to the er for evolution. Daughter stated the patient refused.

## 2023-04-13 NOTE — TELEPHONE ENCOUNTER
Called the # we have on file for daughter Darlene, the person who answered said we have the wrong #     Called pts # again. Spoke with Cindy she states she is feeling better that her O2 is currently at 90. Advised pt to continue monitoring vitals. If continues than to seek ER for eval. Pt believes it is due to her Prednisone she has stopped taking it. Pt is needing tubing for her Neb machine

## 2023-04-17 DIAGNOSIS — I10 ESSENTIAL HYPERTENSION: ICD-10-CM

## 2023-04-17 LAB
BACTERIA SPEC AEROBE CULT: NORMAL
BACTERIA SPEC AEROBE CULT: NORMAL

## 2023-04-17 RX ORDER — PROPRANOLOL HYDROCHLORIDE 80 MG/1
TABLET ORAL
Qty: 180 TABLET | Refills: 0 | Status: SHIPPED | OUTPATIENT
Start: 2023-04-17

## 2023-05-18 RX ORDER — TRAZODONE HYDROCHLORIDE 100 MG/1
TABLET ORAL
Qty: 30 TABLET | Refills: 0 | Status: SHIPPED | OUTPATIENT
Start: 2023-05-18

## 2023-05-18 NOTE — TELEPHONE ENCOUNTER
Called patient and notified her that she needs follow up appt for additional refills.  I transferred her to  to make appt

## 2023-05-30 ENCOUNTER — LAB (OUTPATIENT)
Dept: LAB | Facility: HOSPITAL | Age: 63
End: 2023-05-30

## 2023-05-30 ENCOUNTER — OFFICE VISIT (OUTPATIENT)
Dept: FAMILY MEDICINE CLINIC | Facility: CLINIC | Age: 63
End: 2023-05-30

## 2023-05-30 VITALS
BODY MASS INDEX: 16.87 KG/M2 | HEART RATE: 72 BPM | OXYGEN SATURATION: 94 % | RESPIRATION RATE: 21 BRPM | HEIGHT: 63 IN | DIASTOLIC BLOOD PRESSURE: 71 MMHG | SYSTOLIC BLOOD PRESSURE: 125 MMHG | WEIGHT: 95.2 LBS

## 2023-05-30 DIAGNOSIS — I10 ESSENTIAL HYPERTENSION: Primary | ICD-10-CM

## 2023-05-30 DIAGNOSIS — K21.9 GASTROESOPHAGEAL REFLUX DISEASE, UNSPECIFIED WHETHER ESOPHAGITIS PRESENT: ICD-10-CM

## 2023-05-30 DIAGNOSIS — Z13.29 THYROID DISORDER SCREEN: ICD-10-CM

## 2023-05-30 DIAGNOSIS — G47.00 INSOMNIA, UNSPECIFIED TYPE: ICD-10-CM

## 2023-05-30 DIAGNOSIS — E78.2 MIXED HYPERLIPIDEMIA: ICD-10-CM

## 2023-05-30 DIAGNOSIS — F17.210 CIGARETTE NICOTINE DEPENDENCE WITHOUT COMPLICATION: ICD-10-CM

## 2023-05-30 DIAGNOSIS — Z12.2 ENCOUNTER FOR SCREENING FOR LUNG CANCER: ICD-10-CM

## 2023-05-30 DIAGNOSIS — I10 ESSENTIAL HYPERTENSION: ICD-10-CM

## 2023-05-30 DIAGNOSIS — Z12.31 BREAST CANCER SCREENING BY MAMMOGRAM: ICD-10-CM

## 2023-05-30 DIAGNOSIS — F41.9 ANXIETY: ICD-10-CM

## 2023-05-30 DIAGNOSIS — J44.9 CHRONIC OBSTRUCTIVE PULMONARY DISEASE, UNSPECIFIED COPD TYPE: ICD-10-CM

## 2023-05-30 LAB
ALBUMIN SERPL-MCNC: 4.3 G/DL (ref 3.5–5.2)
ALBUMIN/GLOB SERPL: 1.5 G/DL
ALP SERPL-CCNC: 77 U/L (ref 39–117)
ALT SERPL W P-5'-P-CCNC: 17 U/L (ref 1–33)
ANION GAP SERPL CALCULATED.3IONS-SCNC: 12 MMOL/L (ref 5–15)
AST SERPL-CCNC: 17 U/L (ref 1–32)
BASOPHILS # BLD AUTO: 0.05 10*3/MM3 (ref 0–0.2)
BASOPHILS NFR BLD AUTO: 0.3 % (ref 0–1.5)
BILIRUB SERPL-MCNC: 0.7 MG/DL (ref 0–1.2)
BUN SERPL-MCNC: 12 MG/DL (ref 8–23)
BUN/CREAT SERPL: 18.2 (ref 7–25)
CALCIUM SPEC-SCNC: 10.8 MG/DL (ref 8.6–10.5)
CHLORIDE SERPL-SCNC: 99 MMOL/L (ref 98–107)
CHOLEST SERPL-MCNC: 204 MG/DL (ref 0–200)
CO2 SERPL-SCNC: 28 MMOL/L (ref 22–29)
CREAT SERPL-MCNC: 0.66 MG/DL (ref 0.57–1)
DEPRECATED RDW RBC AUTO: 42.8 FL (ref 37–54)
EGFRCR SERPLBLD CKD-EPI 2021: 98.7 ML/MIN/1.73
EOSINOPHIL # BLD AUTO: 0.04 10*3/MM3 (ref 0–0.4)
EOSINOPHIL NFR BLD AUTO: 0.2 % (ref 0.3–6.2)
ERYTHROCYTE [DISTWIDTH] IN BLOOD BY AUTOMATED COUNT: 12.7 % (ref 12.3–15.4)
GLOBULIN UR ELPH-MCNC: 2.8 GM/DL
GLUCOSE SERPL-MCNC: 93 MG/DL (ref 65–99)
HCT VFR BLD AUTO: 43.6 % (ref 34–46.6)
HDLC SERPL-MCNC: 52 MG/DL (ref 40–60)
HGB BLD-MCNC: 15.1 G/DL (ref 12–15.9)
IMM GRANULOCYTES # BLD AUTO: 0.09 10*3/MM3 (ref 0–0.05)
IMM GRANULOCYTES NFR BLD AUTO: 0.5 % (ref 0–0.5)
LDLC SERPL CALC-MCNC: 130 MG/DL (ref 0–100)
LDLC/HDLC SERPL: 2.45 {RATIO}
LYMPHOCYTES # BLD AUTO: 2.58 10*3/MM3 (ref 0.7–3.1)
LYMPHOCYTES NFR BLD AUTO: 14.4 % (ref 19.6–45.3)
MCH RBC QN AUTO: 31.8 PG (ref 26.6–33)
MCHC RBC AUTO-ENTMCNC: 34.6 G/DL (ref 31.5–35.7)
MCV RBC AUTO: 91.8 FL (ref 79–97)
MONOCYTES # BLD AUTO: 0.99 10*3/MM3 (ref 0.1–0.9)
MONOCYTES NFR BLD AUTO: 5.5 % (ref 5–12)
NEUTROPHILS NFR BLD AUTO: 14.14 10*3/MM3 (ref 1.7–7)
NEUTROPHILS NFR BLD AUTO: 79.1 % (ref 42.7–76)
NRBC BLD AUTO-RTO: 0 /100 WBC (ref 0–0.2)
PLATELET # BLD AUTO: 268 10*3/MM3 (ref 140–450)
PMV BLD AUTO: 10.4 FL (ref 6–12)
POTASSIUM SERPL-SCNC: 3.8 MMOL/L (ref 3.5–5.2)
PROT SERPL-MCNC: 7.1 G/DL (ref 6–8.5)
RBC # BLD AUTO: 4.75 10*6/MM3 (ref 3.77–5.28)
SODIUM SERPL-SCNC: 139 MMOL/L (ref 136–145)
TRIGL SERPL-MCNC: 122 MG/DL (ref 0–150)
TSH SERPL DL<=0.05 MIU/L-ACNC: 1.08 UIU/ML (ref 0.27–4.2)
VLDLC SERPL-MCNC: 22 MG/DL (ref 5–40)
WBC NRBC COR # BLD: 17.89 10*3/MM3 (ref 3.4–10.8)

## 2023-05-30 PROCEDURE — 36415 COLL VENOUS BLD VENIPUNCTURE: CPT

## 2023-05-30 PROCEDURE — 80053 COMPREHEN METABOLIC PANEL: CPT

## 2023-05-30 PROCEDURE — 84443 ASSAY THYROID STIM HORMONE: CPT

## 2023-05-30 PROCEDURE — 80061 LIPID PANEL: CPT

## 2023-05-30 PROCEDURE — 85025 COMPLETE CBC W/AUTO DIFF WBC: CPT

## 2023-05-30 RX ORDER — OMEPRAZOLE 20 MG/1
20 CAPSULE, DELAYED RELEASE ORAL DAILY
Qty: 90 CAPSULE | Refills: 1 | Status: SHIPPED | OUTPATIENT
Start: 2023-05-30

## 2023-05-30 RX ORDER — BUSPIRONE HYDROCHLORIDE 10 MG/1
10 TABLET ORAL 2 TIMES DAILY
Qty: 180 TABLET | Refills: 1 | Status: SHIPPED | OUTPATIENT
Start: 2023-05-30

## 2023-05-30 RX ORDER — TIOTROPIUM BROMIDE INHALATION SPRAY 1.56 UG/1
2 SPRAY, METERED RESPIRATORY (INHALATION)
Qty: 12 G | Refills: 2 | Status: SHIPPED | OUTPATIENT
Start: 2023-05-30

## 2023-05-30 RX ORDER — ATORVASTATIN CALCIUM 10 MG/1
10 TABLET, FILM COATED ORAL DAILY
Qty: 90 TABLET | Refills: 1 | Status: SHIPPED | OUTPATIENT
Start: 2023-05-30

## 2023-05-30 RX ORDER — PROPRANOLOL HYDROCHLORIDE 80 MG/1
80 TABLET ORAL 2 TIMES DAILY
Qty: 180 TABLET | Refills: 1 | Status: SHIPPED | OUTPATIENT
Start: 2023-05-30

## 2023-05-30 RX ORDER — TRAZODONE HYDROCHLORIDE 100 MG/1
100 TABLET ORAL NIGHTLY
Qty: 90 TABLET | Refills: 1 | Status: SHIPPED | OUTPATIENT
Start: 2023-05-30

## 2023-05-30 RX ORDER — METHOCARBAMOL 750 MG/1
750 TABLET, FILM COATED ORAL 3 TIMES DAILY PRN
Qty: 90 TABLET | Refills: 1 | Status: SHIPPED | OUTPATIENT
Start: 2023-05-30

## 2023-05-30 RX ORDER — AMLODIPINE BESYLATE 2.5 MG/1
2.5 TABLET ORAL DAILY
Qty: 90 TABLET | Refills: 1 | Status: SHIPPED | OUTPATIENT
Start: 2023-05-30

## 2023-05-30 RX ORDER — LOSARTAN POTASSIUM AND HYDROCHLOROTHIAZIDE 12.5; 5 MG/1; MG/1
1 TABLET ORAL DAILY
Qty: 180 TABLET | Refills: 1 | Status: SHIPPED | OUTPATIENT
Start: 2023-05-30

## 2023-05-30 RX ORDER — CITALOPRAM 40 MG/1
40 TABLET ORAL DAILY
Qty: 90 TABLET | Refills: 1 | Status: SHIPPED | OUTPATIENT
Start: 2023-05-30

## 2023-05-30 NOTE — PROGRESS NOTES
Chief Complaint  Med Refill, Follow-up, Hypertension, Hyperlipidemia, Heartburn, Anxiety, Depression, and Insomnia    SUBJECTIVE  Cindy Bella presents to [facility]     Patient here today to follow-up on chronic conditions, needing refills.    Patient states she had a COPD exacerbation in April, states this is the first time this is ever happened, states that it scared her and she would like referral to pulmonology.  Still smoking    History of Present Illness  Past Medical History:   Diagnosis Date   • Anxiety    • Arthritis    • Brain neoplasm malignant    • Depression    • Fibromyalgia, primary    • HL (hearing loss)    • Hyperlipidemia    • Hypertension    • Irritable bowel syndrome    • Low back pain    • Scoliosis    • SVT (supraventricular tachycardia)    • Urethral stricture 2015      Family History   Problem Relation Age of Onset   • Hypertension Mother    • Stroke Mother    • Kidney disease Mother    • Hypertension Sister    • Stroke Sister    • Hypertension Brother    • Hypertension Maternal Grandmother    • Hypertension Maternal Grandfather    • Hypertension Paternal Grandmother         Cerebral hemorrhage   • Breast cancer Other    • Pancreatic cancer Other    • Thyroid cancer Other         Gland   • Hyperlipidemia Other    • Cancer Other    • Coronary artery disease Other    • Hypertension Other    • Stroke Other    • Hypertension Sister    • Other Sister         Breast Cancer   • Hypertension Sister    • Stroke Sister    • Hypertension Brother    • Other Maternal Aunt    • Other Sister       Past Surgical History:   Procedure Laterality Date   • APPENDECTOMY     • BREAST SURGERY     • CARDIAC SURGERY     •  SECTION     • COLONOSCOPY     • OOPHORECTOMY Right    • UPPER GASTROINTESTINAL ENDOSCOPY          Current Outpatient Medications:   •  albuterol (ACCUNEB) 0.63 MG/3ML nebulizer solution, Take 3 mL by nebulization Every 6 (Six) Hours As Needed  for Wheezing., Disp: 30 each, Rfl: 0  •  albuterol sulfate  (90 Base) MCG/ACT inhaler, INHALE TWO PUFFS BY MOUTH EVERY 4 HOURS AS NEEDED FOR WHEEZING (BULK), Disp: 20.1 g, Rfl: 0  •  amLODIPine (NORVASC) 2.5 MG tablet, Take 1 tablet by mouth Daily., Disp: 90 tablet, Rfl: 1  •  Aspirin Low Dose 81 MG EC tablet, TAKE ONE TABLET BY MOUTH DAILY AT 9 AM (VIAL), Disp: 90 tablet, Rfl: 1  •  busPIRone (BUSPAR) 10 MG tablet, Take 1 tablet by mouth 2 (Two) Times a Day., Disp: 180 tablet, Rfl: 1  •  citalopram (CeleXA) 40 MG tablet, Take 1 tablet by mouth Daily., Disp: 90 tablet, Rfl: 1  •  dicyclomine (BENTYL) 10 MG capsule, TAKE TWO CAPSULES BY MOUTH FOUR TIMES DAILY BEFORE MEALS & AT BEDTIME (VIAL), Disp: 120 capsule, Rfl: 2  •  ipratropium-albuterol (DUO-NEB) 0.5-2.5 mg/3 ml nebulizer, Take 3 mL by nebulization 3 (Three) Times a Day As Needed for Wheezing., Disp: 810 mL, Rfl: 1  •  losartan-hydrochlorothiazide (HYZAAR) 50-12.5 MG per tablet, Take 1 tablet by mouth Daily., Disp: 180 tablet, Rfl: 1  •  methocarbamol (ROBAXIN) 750 MG tablet, Take 1 tablet by mouth 3 (Three) Times a Day As Needed for Muscle Spasms., Disp: 90 tablet, Rfl: 1  •  Multiple Vitamin (multivitamin) capsule, Take 1 capsule by mouth Daily., Disp: 30 capsule, Rfl: 11  •  omeprazole (priLOSEC) 20 MG capsule, Take 1 capsule by mouth Daily., Disp: 90 capsule, Rfl: 1  •  ondansetron ODT (ZOFRAN-ODT) 4 MG disintegrating tablet, Place 0.25 tablets on the tongue As Needed for Nausea or Vomiting., Disp: 90 tablet, Rfl: 1  •  propranolol (INDERAL) 80 MG tablet, Take 1 tablet by mouth 2 (Two) Times a Day., Disp: 180 tablet, Rfl: 1  •  Tiotropium Bromide Monohydrate (Spiriva Respimat) 1.25 MCG/ACT aerosol solution inhaler, Inhale 2 puffs Daily., Disp: 12 g, Rfl: 2  •  traZODone (DESYREL) 100 MG tablet, Take 1 tablet by mouth Every Night., Disp: 90 tablet, Rfl: 1  •  atorvastatin (LIPITOR) 10 MG tablet, Take 1 tablet by mouth Daily., Disp: 90 tablet, Rfl:  "1    OBJECTIVE  Vital Signs:   /71 (BP Location: Right arm, Patient Position: Sitting, Cuff Size: Large Adult)   Pulse 72   Resp 21   Ht 160 cm (63\")   Wt 43.2 kg (95 lb 3.2 oz)   SpO2 94%   BMI 16.86 kg/m²    Estimated body mass index is 16.86 kg/m² as calculated from the following:    Height as of this encounter: 160 cm (63\").    Weight as of this encounter: 43.2 kg (95 lb 3.2 oz).     Wt Readings from Last 3 Encounters:   05/30/23 43.2 kg (95 lb 3.2 oz)   04/12/23 42 kg (92 lb 9.5 oz)   01/24/23 40.8 kg (90 lb)     BP Readings from Last 3 Encounters:   05/30/23 125/71   04/12/23 141/92   01/24/23 126/54       Physical Exam  Vitals reviewed.   Constitutional:       Appearance: Normal appearance. She is well-developed.   HENT:      Head: Normocephalic and atraumatic.      Right Ear: External ear normal.      Left Ear: External ear normal.   Eyes:      Conjunctiva/sclera: Conjunctivae normal.      Pupils: Pupils are equal, round, and reactive to light.   Cardiovascular:      Rate and Rhythm: Normal rate and regular rhythm.      Heart sounds: No murmur heard.    No friction rub. No gallop.   Pulmonary:      Effort: Pulmonary effort is normal.      Breath sounds: Normal breath sounds. No wheezing or rhonchi.   Skin:     General: Skin is warm and dry.   Neurological:      Mental Status: She is alert and oriented to person, place, and time.      Cranial Nerves: No cranial nerve deficit.   Psychiatric:         Mood and Affect: Mood and affect normal.         Behavior: Behavior normal.         Thought Content: Thought content normal.         Judgment: Judgment normal.          Result Review    CMP        2/17/2023    14:13 4/12/2023    04:52   CMP   Glucose  107     BUN  12     Creatinine 0.70   0.61     EGFR 97.3   100.6     Sodium  141     Potassium  3.1     Chloride  100     Calcium  10.7     Total Protein  7.6     Albumin  4.2     Globulin  3.4     Total Bilirubin  0.4     Alkaline Phosphatase  97     AST " (SGOT)  17     ALT (SGPT)  14     Albumin/Globulin Ratio  1.2     BUN/Creatinine Ratio  19.7     Anion Gap  8.6       CBC        4/12/2023    04:52   CBC   WBC 17.08     RBC 4.77     Hemoglobin 14.9     Hematocrit 43.6     MCV 91.4     MCH 31.2     MCHC 34.2     RDW 12.5     Platelets 402                 CT Head With & Without Contrast    Result Date: 2/17/2023    1. No acute intracranial abnormality 2. Mucosal inflammatory changes in the paranasal sinuses, as above 3. Tiny right mastoid effusion     Lebron Brown M.D.       Electronically Signed and Approved By: Lebron Borwn M.D. on 2/17/2023 at 15:17             XR Chest 1 View    Result Date: 4/12/2023    No acute infiltrate is appreciated.     Please note that portions of this note were completed with a voice recognition program.  ROSELIA KIM JR, MD       Electronically Signed and Approved By: ROSELIA KIM JR, MD on 4/12/2023 at 5:39                 The above data has been reviewed by BROOKLYN Bowen 05/30/2023 07:24 EDT.          Patient Care Team:  Tina Hartley APRN as PCP - General (Nurse Practitioner)           ASSESSMENT & PLAN    Diagnoses and all orders for this visit:    1. Essential hypertension (Primary)  Assessment & Plan:  Blood pressure in excellent control, continue current medications    Orders:  -     amLODIPine (NORVASC) 2.5 MG tablet; Take 1 tablet by mouth Daily.  Dispense: 90 tablet; Refill: 1  -     losartan-hydrochlorothiazide (HYZAAR) 50-12.5 MG per tablet; Take 1 tablet by mouth Daily.  Dispense: 180 tablet; Refill: 1  -     propranolol (INDERAL) 80 MG tablet; Take 1 tablet by mouth 2 (Two) Times a Day.  Dispense: 180 tablet; Refill: 1  -     Comprehensive Metabolic Panel; Future  -     CBC & Differential; Future  -     Lipid Panel; Future    2. Anxiety  Assessment & Plan:  Stable, well-controlled, continue current medications    Orders:  -     busPIRone (BUSPAR) 10 MG tablet; Take 1 tablet by mouth 2 (Two) Times a  Day.  Dispense: 180 tablet; Refill: 1  -     citalopram (CeleXA) 40 MG tablet; Take 1 tablet by mouth Daily.  Dispense: 90 tablet; Refill: 1    3. Gastroesophageal reflux disease, unspecified whether esophagitis present  -     omeprazole (priLOSEC) 20 MG capsule; Take 1 capsule by mouth Daily.  Dispense: 90 capsule; Refill: 1    4. Insomnia, unspecified type  Assessment & Plan:  Stable on trazodone, continue current dose    Orders:  -     traZODone (DESYREL) 100 MG tablet; Take 1 tablet by mouth Every Night.  Dispense: 90 tablet; Refill: 1    5. Cigarette nicotine dependence without complication  Assessment & Plan:  Still smoking about 1/2 ppd , sometimes less, not ready to quit at present     Orders:  -     CT Chest Low Dose Wo; Future    6. Chronic obstructive pulmonary disease, unspecified COPD type  Assessment & Plan:  Patient reporting she had her first significant COPD exacerbation which required a trip to the ER in April, patient continues to smoke, not ready to quit at present, requesting referral to pulmonology, referral placed, low-dose chest CT screening due next month, order placed      Orders:  -     Tiotropium Bromide Monohydrate (Spiriva Respimat) 1.25 MCG/ACT aerosol solution inhaler; Inhale 2 puffs Daily.  Dispense: 12 g; Refill: 2  -     Ambulatory Referral to Pulmonology    7. Breast cancer screening by mammogram  -     Mammo Screening Digital Tomosynthesis Bilateral With CAD; Future    8. Encounter for screening for lung cancer  -     CT Chest Low Dose Wo; Future    9. Mixed hyperlipidemia  Assessment & Plan:  Patient having myalgias with Crestor, we will trial switching to atorvastatin.    Orders:  -     atorvastatin (LIPITOR) 10 MG tablet; Take 1 tablet by mouth Daily.  Dispense: 90 tablet; Refill: 1  -     Lipid Panel; Future    10. Thyroid disorder screen  -     TSH; Future    Other orders  -     methocarbamol (ROBAXIN) 750 MG tablet; Take 1 tablet by mouth 3 (Three) Times a Day As Needed for  Muscle Spasms.  Dispense: 90 tablet; Refill: 1       Tobacco Use: High Risk   • Smoking Tobacco Use: Every Day   • Smokeless Tobacco Use: Never   • Passive Exposure: Not on file       Follow Up     Return in about 6 months (around 11/30/2023), or if symptoms worsen or fail to improve.        Patient was given instructions and counseling regarding her condition or for health maintenance advice. Please see specific information pulled into the AVS if appropriate.   I have reviewed information obtained and documented by others and I have confirmed the accuracy of this documented note.    Tina Hartley APRN

## 2023-05-30 NOTE — ASSESSMENT & PLAN NOTE
Patient reporting she had her first significant COPD exacerbation which required a trip to the ER in April, patient continues to smoke, not ready to quit at present, requesting referral to pulmonology, referral placed, low-dose chest CT screening due next month, order placed

## 2023-06-01 DIAGNOSIS — D72.829 LEUKOCYTOSIS, UNSPECIFIED TYPE: Primary | ICD-10-CM

## 2023-06-01 DIAGNOSIS — E83.52 HYPERCALCEMIA: ICD-10-CM

## 2023-06-05 ENCOUNTER — HOSPITAL ENCOUNTER (OUTPATIENT)
Dept: GENERAL RADIOLOGY | Facility: HOSPITAL | Age: 63
Discharge: HOME | End: 2023-06-05
Payer: MEDICARE

## 2023-06-05 ENCOUNTER — LAB (OUTPATIENT)
Dept: LAB | Facility: HOSPITAL | Age: 63
End: 2023-06-05
Payer: MEDICARE

## 2023-06-05 DIAGNOSIS — D72.829 LEUKOCYTOSIS, UNSPECIFIED TYPE: ICD-10-CM

## 2023-06-05 DIAGNOSIS — E83.52 HYPERCALCEMIA: ICD-10-CM

## 2023-06-05 LAB
BASOPHILS # BLD AUTO: 0.05 10*3/MM3 (ref 0–0.2)
BASOPHILS NFR BLD AUTO: 0.4 % (ref 0–1.5)
CALCIUM SPEC-SCNC: 10.7 MG/DL (ref 8.6–10.5)
DEPRECATED RDW RBC AUTO: 44.1 FL (ref 37–54)
EOSINOPHIL # BLD AUTO: 0.14 10*3/MM3 (ref 0–0.4)
EOSINOPHIL NFR BLD AUTO: 1.3 % (ref 0.3–6.2)
ERYTHROCYTE [DISTWIDTH] IN BLOOD BY AUTOMATED COUNT: 12.8 % (ref 12.3–15.4)
HCT VFR BLD AUTO: 41.4 % (ref 34–46.6)
HGB BLD-MCNC: 14 G/DL (ref 12–15.9)
IMM GRANULOCYTES # BLD AUTO: 0.02 10*3/MM3 (ref 0–0.05)
IMM GRANULOCYTES NFR BLD AUTO: 0.2 % (ref 0–0.5)
LYMPHOCYTES # BLD AUTO: 3.2 10*3/MM3 (ref 0.7–3.1)
LYMPHOCYTES NFR BLD AUTO: 28.6 % (ref 19.6–45.3)
MCH RBC QN AUTO: 31.7 PG (ref 26.6–33)
MCHC RBC AUTO-ENTMCNC: 33.8 G/DL (ref 31.5–35.7)
MCV RBC AUTO: 93.9 FL (ref 79–97)
MONOCYTES # BLD AUTO: 0.82 10*3/MM3 (ref 0.1–0.9)
MONOCYTES NFR BLD AUTO: 7.3 % (ref 5–12)
NEUTROPHILS NFR BLD AUTO: 6.96 10*3/MM3 (ref 1.7–7)
NEUTROPHILS NFR BLD AUTO: 62.2 % (ref 42.7–76)
NRBC BLD AUTO-RTO: 0 /100 WBC (ref 0–0.2)
PLATELET # BLD AUTO: 338 10*3/MM3 (ref 140–450)
PMV BLD AUTO: 9.8 FL (ref 6–12)
PTH-INTACT SERPL-MCNC: 32.4 PG/ML (ref 15–65)
RBC # BLD AUTO: 4.41 10*6/MM3 (ref 3.77–5.28)
WBC NRBC COR # BLD: 11.19 10*3/MM3 (ref 3.4–10.8)

## 2023-06-05 PROCEDURE — 36415 COLL VENOUS BLD VENIPUNCTURE: CPT

## 2023-06-05 PROCEDURE — 85025 COMPLETE CBC W/AUTO DIFF WBC: CPT

## 2023-06-05 PROCEDURE — 83970 ASSAY OF PARATHORMONE: CPT

## 2023-06-05 PROCEDURE — 71046 X-RAY EXAM CHEST 2 VIEWS: CPT

## 2023-06-05 PROCEDURE — 82310 ASSAY OF CALCIUM: CPT

## 2023-06-05 RX ORDER — ALBUTEROL SULFATE 90 UG/1
AEROSOL, METERED RESPIRATORY (INHALATION)
Qty: 20.1 G | Refills: 0 | Status: SHIPPED | OUTPATIENT
Start: 2023-06-05

## 2023-06-06 ENCOUNTER — TELEPHONE (OUTPATIENT)
Dept: FAMILY MEDICINE CLINIC | Facility: CLINIC | Age: 63
End: 2023-06-06
Payer: MEDICARE

## 2023-06-06 DIAGNOSIS — E83.52 SERUM CALCIUM ELEVATED: ICD-10-CM

## 2023-06-06 DIAGNOSIS — R79.89 ABNORMAL CBC: ICD-10-CM

## 2023-06-06 RX ORDER — METHOCARBAMOL 750 MG/1
750 TABLET, FILM COATED ORAL 3 TIMES DAILY PRN
Qty: 90 TABLET | Refills: 1 | OUTPATIENT
Start: 2023-06-06

## 2023-06-06 NOTE — TELEPHONE ENCOUNTER
Caller: Cindy Bella    Relationship to patient: Self    Best call back number: [phone]     Patient is needing: PATIENT IS INQUIRING ABOUT COMING IN FOR SHOTS. ONE IS FOR PNEUMONIA AND SHE IS UNSURE WHAT THE OTHER IS FOR. PLEASE CALL TO ADVISE.

## 2023-06-06 NOTE — TELEPHONE ENCOUNTER
Caller: Cindy Bella    Relationship to patient: Self    Best call back number: [phone]     Patient is needing: PATIENT IS CALLING TO REQUEST CLARIFICATION ON HER LAB RESULTS. PLEASE CALL TO ADVISE.

## 2023-06-19 ENCOUNTER — EXTERNAL PBMM DATA (OUTPATIENT)
Dept: PHARMACY | Facility: OTHER | Age: 63
End: 2023-06-19
Payer: MEDICARE

## 2023-07-24 ENCOUNTER — TELEPHONE (OUTPATIENT)
Dept: OBSTETRICS AND GYNECOLOGY | Facility: CLINIC | Age: 63
End: 2023-07-24
Payer: MEDICARE

## 2023-07-24 DIAGNOSIS — R92.8 ABNORMAL MAMMOGRAM: Primary | ICD-10-CM

## 2023-07-24 NOTE — TELEPHONE ENCOUNTER
Patient called and aware of her results and recommendations. Please sign the attached orders. You were DOTD on the date performed.

## 2023-07-24 NOTE — TELEPHONE ENCOUNTER
----- Message from Amanda Choi sent at 7/24/2023 11:23 AM EDT -----  Naina,  You may have already seen this.  Please take care of.    Thanks,  ----- Message -----  From: Interface, [facility] [place] [facility]  Sent: 7/24/2023   9:05 AM EDT  To: [facility] [place] Manager Results Routing

## 2023-08-02 ENCOUNTER — EXTERNAL PBMM DATA (OUTPATIENT)
Dept: PHARMACY | Facility: OTHER | Age: 63
End: 2023-08-02
Payer: MEDICARE

## 2023-08-04 ENCOUNTER — HOSPITAL ENCOUNTER (OUTPATIENT)
Dept: ULTRASOUND IMAGING | Facility: HOSPITAL | Age: 63
Discharge: HOME | End: 2023-08-04
Payer: MEDICARE

## 2023-08-04 ENCOUNTER — HOSPITAL ENCOUNTER (OUTPATIENT)
Dept: MAMMOGRAPHY | Facility: HOSPITAL | Age: 63
Discharge: HOME | End: 2023-08-04
Payer: MEDICARE

## 2023-08-04 DIAGNOSIS — R92.8 ABNORMAL MAMMOGRAM: ICD-10-CM

## 2023-08-04 PROCEDURE — 77065 DX MAMMO INCL CAD UNI: CPT

## 2023-08-04 PROCEDURE — 76642 ULTRASOUND BREAST LIMITED: CPT

## 2023-08-04 PROCEDURE — G0279 TOMOSYNTHESIS, MAMMO: HCPCS

## 2023-08-08 ENCOUNTER — POP HEALTH PHARMACY (OUTPATIENT)
Dept: PHARMACY | Facility: OTHER | Age: 63
End: 2023-08-08
Payer: MEDICARE

## 2023-08-10 ENCOUNTER — TELEPHONE (OUTPATIENT)
Dept: FAMILY MEDICINE CLINIC | Facility: CLINIC | Age: 63
End: 2023-08-10
Payer: MEDICARE

## 2023-08-10 DIAGNOSIS — I10 ESSENTIAL HYPERTENSION: ICD-10-CM

## 2023-08-10 RX ORDER — LOSARTAN POTASSIUM AND HYDROCHLOROTHIAZIDE 12.5; 5 MG/1; MG/1
1 TABLET ORAL DAILY
Qty: 180 TABLET | Refills: 1 | Status: CANCELLED | OUTPATIENT
Start: 2023-08-10

## 2023-08-10 NOTE — TELEPHONE ENCOUNTER
Caller: Martha Bellaoracheryle QUINTANILLA    Relationship: Self    Best call back number: [phone]     Requested Prescriptions:   Requested Prescriptions     Pending Prescriptions Disp Refills    losartan-hydrochlorothiazide (HYZAAR) 50-12.5 MG per tablet 180 tablet 1     Sig: Take 1 tablet by mouth Daily.        Pharmacy where request should be sent: [facility] [place] - [place], [place] - [address] - [phone] [facility] [phone] FX     Last office visit with prescribing clinician: 5/30/2023   Last telemedicine visit with prescribing clinician: Visit date not found   Next office visit with prescribing clinician: 11/30/2023     Additional details provided by patient: CALLER STATED THEY HAD BEEN TAKING MEDICATION 2 TIMES A DAY FOR A WHILE AND THAT THEY HAD RAN OUT BECAUSE SHE HAD NOT NOTICED THE DOSE CHANGE    Does the patient have less than a 3 day supply:  [x] Yes  [] No    Would you like a call back once the refill request has been completed: [x] Yes [] No    If the office needs to give you a call back, can they leave a voicemail: [x] Yes [] No    Tiffanie Braba Rep   08/10/23 15:20 EDT

## 2023-08-11 DIAGNOSIS — I10 ESSENTIAL HYPERTENSION: ICD-10-CM

## 2023-08-14 RX ORDER — LOSARTAN POTASSIUM AND HYDROCHLOROTHIAZIDE 12.5; 5 MG/1; MG/1
TABLET ORAL
Qty: 180 TABLET | Refills: 1 | Status: SHIPPED | OUTPATIENT
Start: 2023-08-14

## 2023-08-30 ENCOUNTER — EXTERNAL PBMM DATA (OUTPATIENT)
Dept: PHARMACY | Facility: OTHER | Age: 63
End: 2023-08-30
Payer: MEDICARE

## 2023-09-14 RX ORDER — ALBUTEROL SULFATE 90 UG/1
AEROSOL, METERED RESPIRATORY (INHALATION)
Qty: 20.1 G | Refills: 3 | Status: SHIPPED | OUTPATIENT
Start: 2023-09-14

## 2023-09-25 DIAGNOSIS — K21.9 GASTROESOPHAGEAL REFLUX DISEASE, UNSPECIFIED WHETHER ESOPHAGITIS PRESENT: ICD-10-CM

## 2023-09-25 RX ORDER — ONDANSETRON 4 MG/1
TABLET, ORALLY DISINTEGRATING ORAL
Qty: 90 TABLET | Refills: 0 | Status: SHIPPED | OUTPATIENT
Start: 2023-09-25

## 2023-09-28 ENCOUNTER — EXTERNAL PBMM DATA (OUTPATIENT)
Dept: PHARMACY | Facility: OTHER | Age: 63
End: 2023-09-28
Payer: MEDICARE

## 2023-10-11 RX ORDER — METHOCARBAMOL 750 MG/1
TABLET, FILM COATED ORAL
Qty: 90 TABLET | Refills: 0 | Status: SHIPPED | OUTPATIENT
Start: 2023-10-11

## 2023-10-13 DIAGNOSIS — K21.9 GASTROESOPHAGEAL REFLUX DISEASE, UNSPECIFIED WHETHER ESOPHAGITIS PRESENT: ICD-10-CM

## 2023-10-13 NOTE — TELEPHONE ENCOUNTER
Caller: [facility] - [place], [place] - [address] - [phone] [facility] [phone] FX    Relationship: Pharmacy    Best call back number: [phone]     Requested Prescriptions:   Requested Prescriptions     Pending Prescriptions Disp Refills    ondansetron ODT (ZOFRAN-ODT) 4 MG disintegrating tablet 90 tablet 0        Pharmacy where request should be sent: [facility] - [place], [place] - [address] - [phone] [facility] [phone] FX     Last office visit with prescribing clinician: 5/30/2023   Last telemedicine visit with prescribing clinician: Visit date not found   Next office visit with prescribing clinician: 11/30/2023     Does the patient have less than a 3 day supply:  [] Yes  [x] No    Would you like a call back once the refill request has been completed: [] Yes [x] No    If the office needs to give you a call back, can they leave a voicemail: [] Yes [x] No    Tiffanie Barba Rep   10/13/23 10:01 EDT

## 2023-10-16 RX ORDER — ONDANSETRON 4 MG/1
TABLET, ORALLY DISINTEGRATING ORAL
Qty: 90 TABLET | Refills: 0 | OUTPATIENT
Start: 2023-10-16

## 2023-10-25 ENCOUNTER — TELEPHONE (OUTPATIENT)
Dept: FAMILY MEDICINE CLINIC | Facility: CLINIC | Age: 63
End: 2023-10-25

## 2023-10-25 ENCOUNTER — EXTERNAL PBMM DATA (OUTPATIENT)
Dept: PHARMACY | Facility: OTHER | Age: 63
End: 2023-10-25
Payer: MEDICARE

## 2023-10-25 NOTE — TELEPHONE ENCOUNTER
Caller: Cindy Bella    Relationship to patient: Self    Best call back number: [phone]     Chief complaint: SCOLIOSIS AND SOMETHING UNDER RIB CAGE THE SIZE OF A FIST THAT IS PUSHING RIBS OUTWARDS    Type of visit: OFFICE VISIT    Requested date: AS SOON AS POSSIBLE     If rescheduling, when is the original appointment: 11.15.2023     Additional notes:PATIENT REQUESTING APPOINTMENT WITH ADDY MONTOYA. PATIENT STATES THAT SHE IS CONCERNED ABOUT FIST SIZE KNOT PUSHING OUT HER RIBS ON ONE SIDE. ATTEMPTED TO WARM TRANSFER.

## 2023-10-25 NOTE — TELEPHONE ENCOUNTER
Spoke with pt, advised if it causing concern and pain then to go to the ER for evaluation. Pt states its not painful.     Scheduled for Monday

## 2023-10-30 ENCOUNTER — HOSPITAL ENCOUNTER (OUTPATIENT)
Dept: GENERAL RADIOLOGY | Facility: HOSPITAL | Age: 63
Discharge: HOME | End: 2023-10-30
Payer: MEDICARE

## 2023-10-30 ENCOUNTER — LAB (OUTPATIENT)
Dept: LAB | Facility: HOSPITAL | Age: 63
End: 2023-10-30
Payer: MEDICARE

## 2023-10-30 ENCOUNTER — OFFICE VISIT (OUTPATIENT)
Dept: FAMILY MEDICINE CLINIC | Facility: CLINIC | Age: 63
End: 2023-10-30
Payer: MEDICARE

## 2023-10-30 VITALS
WEIGHT: 96.6 LBS | BODY MASS INDEX: 17.12 KG/M2 | SYSTOLIC BLOOD PRESSURE: 120 MMHG | HEART RATE: 71 BPM | OXYGEN SATURATION: 96 % | HEIGHT: 63 IN | DIASTOLIC BLOOD PRESSURE: 59 MMHG

## 2023-10-30 DIAGNOSIS — E83.52 SERUM CALCIUM ELEVATED: ICD-10-CM

## 2023-10-30 DIAGNOSIS — R79.89 ABNORMAL CBC: ICD-10-CM

## 2023-10-30 DIAGNOSIS — Z13.29 THYROID DISORDER SCREEN: ICD-10-CM

## 2023-10-30 DIAGNOSIS — R19.00 SWELLING ABDOMEN: ICD-10-CM

## 2023-10-30 DIAGNOSIS — Q67.8 CHEST WALL ASYMMETRY: ICD-10-CM

## 2023-10-30 DIAGNOSIS — F17.210 CIGARETTE NICOTINE DEPENDENCE WITHOUT COMPLICATION: ICD-10-CM

## 2023-10-30 DIAGNOSIS — Z00.00 MEDICARE ANNUAL WELLNESS VISIT, SUBSEQUENT: Primary | ICD-10-CM

## 2023-10-30 DIAGNOSIS — R07.81 RIB PAIN ON LEFT SIDE: ICD-10-CM

## 2023-10-30 DIAGNOSIS — I10 ESSENTIAL HYPERTENSION: ICD-10-CM

## 2023-10-30 DIAGNOSIS — K21.9 GASTROESOPHAGEAL REFLUX DISEASE, UNSPECIFIED WHETHER ESOPHAGITIS PRESENT: ICD-10-CM

## 2023-10-30 LAB
25(OH)D3 SERPL-MCNC: 39.6 NG/ML (ref 30–100)
ALBUMIN SERPL-MCNC: 4.3 G/DL (ref 3.5–5.2)
ALBUMIN/GLOB SERPL: 2 G/DL
ALP SERPL-CCNC: 70 U/L (ref 39–117)
ALT SERPL W P-5'-P-CCNC: 11 U/L (ref 1–33)
ANION GAP SERPL CALCULATED.3IONS-SCNC: 9.3 MMOL/L (ref 5–15)
AST SERPL-CCNC: 17 U/L (ref 1–32)
BASOPHILS # BLD AUTO: 0.03 10*3/MM3 (ref 0–0.2)
BASOPHILS NFR BLD AUTO: 0.3 % (ref 0–1.5)
BILIRUB SERPL-MCNC: 0.5 MG/DL (ref 0–1.2)
BUN SERPL-MCNC: 10 MG/DL (ref 8–23)
BUN/CREAT SERPL: 12.5 (ref 7–25)
CALCIUM SPEC-SCNC: 10.5 MG/DL (ref 8.6–10.5)
CHLORIDE SERPL-SCNC: 102 MMOL/L (ref 98–107)
CHOLEST SERPL-MCNC: 144 MG/DL (ref 0–200)
CO2 SERPL-SCNC: 29.7 MMOL/L (ref 22–29)
CREAT SERPL-MCNC: 0.8 MG/DL (ref 0.57–1)
DEPRECATED RDW RBC AUTO: 48 FL (ref 37–54)
EGFRCR SERPLBLD CKD-EPI 2021: 82.9 ML/MIN/1.73
EOSINOPHIL # BLD AUTO: 0.08 10*3/MM3 (ref 0–0.4)
EOSINOPHIL NFR BLD AUTO: 0.9 % (ref 0.3–6.2)
ERYTHROCYTE [DISTWIDTH] IN BLOOD BY AUTOMATED COUNT: 13.6 % (ref 12.3–15.4)
GLOBULIN UR ELPH-MCNC: 2.2 GM/DL
GLUCOSE SERPL-MCNC: 94 MG/DL (ref 65–99)
HCT VFR BLD AUTO: 42.9 % (ref 34–46.6)
HDLC SERPL-MCNC: 49 MG/DL (ref 40–60)
HGB BLD-MCNC: 14.3 G/DL (ref 12–15.9)
IMM GRANULOCYTES # BLD AUTO: 0.02 10*3/MM3 (ref 0–0.05)
IMM GRANULOCYTES NFR BLD AUTO: 0.2 % (ref 0–0.5)
LDLC SERPL CALC-MCNC: 70 MG/DL (ref 0–100)
LDLC/HDLC SERPL: 1.36 {RATIO}
LYMPHOCYTES # BLD AUTO: 3.31 10*3/MM3 (ref 0.7–3.1)
LYMPHOCYTES NFR BLD AUTO: 38.4 % (ref 19.6–45.3)
MCH RBC QN AUTO: 31.8 PG (ref 26.6–33)
MCHC RBC AUTO-ENTMCNC: 33.3 G/DL (ref 31.5–35.7)
MCV RBC AUTO: 95.5 FL (ref 79–97)
MONOCYTES # BLD AUTO: 0.42 10*3/MM3 (ref 0.1–0.9)
MONOCYTES NFR BLD AUTO: 4.9 % (ref 5–12)
NEUTROPHILS NFR BLD AUTO: 4.75 10*3/MM3 (ref 1.7–7)
NEUTROPHILS NFR BLD AUTO: 55.3 % (ref 42.7–76)
NRBC BLD AUTO-RTO: 0 /100 WBC (ref 0–0.2)
PLATELET # BLD AUTO: 288 10*3/MM3 (ref 140–450)
PMV BLD AUTO: 10.1 FL (ref 6–12)
POTASSIUM SERPL-SCNC: 3.4 MMOL/L (ref 3.5–5.2)
PROT SERPL-MCNC: 6.5 G/DL (ref 6–8.5)
RBC # BLD AUTO: 4.49 10*6/MM3 (ref 3.77–5.28)
SODIUM SERPL-SCNC: 141 MMOL/L (ref 136–145)
TRIGL SERPL-MCNC: 143 MG/DL (ref 0–150)
TSH SERPL DL<=0.05 MIU/L-ACNC: 0.75 UIU/ML (ref 0.27–4.2)
VLDLC SERPL-MCNC: 25 MG/DL (ref 5–40)
WBC NRBC COR # BLD: 8.61 10*3/MM3 (ref 3.4–10.8)

## 2023-10-30 PROCEDURE — 36415 COLL VENOUS BLD VENIPUNCTURE: CPT

## 2023-10-30 PROCEDURE — 71101 X-RAY EXAM UNILAT RIBS/CHEST: CPT

## 2023-10-30 PROCEDURE — 84443 ASSAY THYROID STIM HORMONE: CPT

## 2023-10-30 PROCEDURE — 80061 LIPID PANEL: CPT

## 2023-10-30 PROCEDURE — 85025 COMPLETE CBC W/AUTO DIFF WBC: CPT

## 2023-10-30 PROCEDURE — 80053 COMPREHEN METABOLIC PANEL: CPT

## 2023-10-30 PROCEDURE — 82306 VITAMIN D 25 HYDROXY: CPT

## 2023-10-30 NOTE — PROGRESS NOTES
The ABCs of the Annual Wellness Visit  Subsequent Medicare Wellness Visit    Subjective    Cindy Bella is a 62 y.o. female who presents for a Subsequent Medicare Wellness Visit.    The following portions of the patient's history were reviewed and   updated as appropriate: allergies, current medications, past family history, past medical history, past social history, past surgical history, and problem list.    Compared to one year ago, the patient feels her physical   health is worse. Scoliosis/pain     Compared to one year ago, the patient feels her mental   health is the same.    Recent Hospitalizations:  She was not admitted to the hospital during the last year.       Current Medical Providers:  Patient Care Team:  Tina Hartley APRN as PCP - General (Nurse Practitioner)    Outpatient Medications Prior to Visit   Medication Sig Dispense Refill    albuterol (ACCUNEB) 0.63 MG/3ML nebulizer solution Take 3 mL by nebulization Every 6 (Six) Hours As Needed for Wheezing. 30 each 0    albuterol sulfate  (90 Base) MCG/ACT inhaler INHALE TWO PUFFS BY MOUTH EVERY 4 HOURS AS NEEDED FOR WHEEZING (BULK) 20.1 g 3    amLODIPine (NORVASC) 2.5 MG tablet Take 1 tablet by mouth Daily. 90 tablet 1    Aspirin Low Dose 81 MG EC tablet TAKE ONE TABLET BY MOUTH DAILY AT 9 AM (VIAL) 90 tablet 11    atorvastatin (LIPITOR) 10 MG tablet Take 1 tablet by mouth Daily. 90 tablet 1    busPIRone (BUSPAR) 10 MG tablet Take 1 tablet by mouth 2 (Two) Times a Day. 180 tablet 1    citalopram (CeleXA) 40 MG tablet Take 1 tablet by mouth Daily. 90 tablet 1    dicyclomine (BENTYL) 10 MG capsule TAKE TWO CAPSULES BY MOUTH FOUR TIMES DAILY BEFORE MEALS & AT BEDTIME (VIAL) 120 capsule 2    losartan-hydrochlorothiazide (HYZAAR) 50-12.5 MG per tablet TAKE ONE TABLET BY MOUTH DAILY (VIAL) 180 tablet 1    methocarbamol (ROBAXIN) 750 MG tablet TAKE ONE TABLET BY MOUTH THREE TIMES DAILY PRIOR TO MEALS FOR MUSCLE SPASMS (VIAL) 90 tablet 0     omeprazole (priLOSEC) 20 MG capsule Take 1 capsule by mouth Daily. 90 capsule 1    ondansetron ODT (ZOFRAN-ODT) 4 MG disintegrating tablet PLACE 1/4 TABLET ON TOP OF TONGUE AS NEEDED FOR NAUSEA AND VOMITING (VIAL) 90 tablet 0    propranolol (INDERAL) 80 MG tablet Take 1 tablet by mouth 2 (Two) Times a Day. 180 tablet 1    Tiotropium Bromide Monohydrate (Spiriva Respimat) 1.25 MCG/ACT aerosol solution inhaler Inhale 2 puffs Daily. 12 g 2    traZODone (DESYREL) 100 MG tablet Take 1 tablet by mouth Every Night. 90 tablet 1    ipratropium-albuterol (DUO-NEB) 0.5-2.5 mg/3 ml nebulizer Take 3 mL by nebulization 3 (Three) Times a Day As Needed for Wheezing. 810 mL 1     No facility-administered medications prior to visit.       No opioid medication identified on active medication list. I have reviewed chart for other potential  high risk medication/s and harmful drug interactions in the elderly.        Aspirin is on active medication list. Aspirin use is indicated based on review of current medical condition/s. Pros and cons of this therapy have been discussed today. Benefits of this medication outweigh potential harm.  Patient has been encouraged to continue taking this medication.  .      Patient Active Problem List   Diagnosis    Chronic obstructive pulmonary disease    Mixed hyperlipidemia    Essential hypertension    Anxiety    Arthritis    Depression    Idiopathic scoliosis and kyphoscoliosis    Malignant neoplasm of brain    Scoliosis    SVT (supraventricular tachycardia)    Urethral stricture    Chronic low back pain without sciatica    Cigarette nicotine dependence without complication    Insomnia    Gastroesophageal reflux disease     Advance Care Planning   Advance Care Planning     Advance Directive is not on file.  ACP discussion was held with the patient during this visit. Patient does not have an advance directive, declines further assistance.     Objective    Vitals:    10/30/23 0820   BP: 120/59   Pulse:  "71   SpO2: 96%   Weight: 43.8 kg (96 lb 9.6 oz)   Height: 160 cm (63\")   PainSc:   6   PainLoc: Rib Cage     Estimated body mass index is 17.11 kg/m² as calculated from the following:    Height as of this encounter: 160 cm (63\").    Weight as of this encounter: 43.8 kg (96 lb 9.6 oz).    BMI is below normal parameters (malnutrition). Recommendations: None, patient's weight is stable      Does the patient have evidence of cognitive impairment? Yes- memory issues           HEALTH RISK ASSESSMENT    Smoking Status:  Social History     Tobacco Use   Smoking Status Every Day    Packs/day: 1.00    Years: 30.00    Additional pack years: 0.00    Total pack years: 30.00    Types: Cigarettes    Start date: 1974    Last attempt to quit: 2021    Years since quittin.9   Smokeless Tobacco Never   Tobacco Comments    3/ ppd     Alcohol Consumption:  Social History     Substance and Sexual Activity   Alcohol Use Never     Fall Risk Screen:    [facility] Fall Risk Assessment was completed, and patient is at LOW risk for falls.Assessment completed on:10/30/2023    Depression Screening:      10/30/2023     8:00 AM   PHQ-2/PHQ-9 Depression Screening   Little Interest or Pleasure in Doing Things 2-->more than half the days   Feeling Down, Depressed or Hopeless 2-->more than half the days   Trouble Falling or Staying Asleep, or Sleeping Too Much 1-->several days   Feeling Tired or Having Little Energy 1-->several days   Poor Appetite or Overeating 1-->several days   Feeling Bad about Yourself - or that You are a Failure or Have Let Yourself or Your Family Down 0-->not at all   Trouble Concentrating on Things, Such as Reading the Newspaper or Watching Television 1-->several days   Moving or Speaking So Slowly that Other People Could Have Noticed? Or the Opposite - Being So Fidgety 1-->several days   Thoughts that You Would be Better Off Dead or of Hurting Yourself in Some Way 0-->not at all   PHQ-9: Brief Depression Severity " Measure Score 9   If You Checked Off Any Problems, How Difficult Have These Problems Made It For You to Do Your Work, Take Care of Things at Home, or Get Along with Other People? somewhat difficult       Health Habits and Functional and Cognitive Screening:      10/30/2023     8:00 AM   Functional & Cognitive Status   Do you have difficulty preparing food and eating? No   Do you have difficulty bathing yourself, getting dressed or grooming yourself? No   Do you have difficulty using the toilet? No   Do you have difficulty moving around from place to place? No   Do you have trouble with steps or getting out of a bed or a chair? No   Current Diet Well Balanced Diet   Dental Exam Up to date   Eye Exam Up to date   Exercise (times per week) 4 times per week        Exercise Comment walking   Do you need help using the phone?  No   Are you deaf or do you have serious difficulty hearing?  Yes   Do you need help to go to places out of walking distance? Yes   Do you need help shopping? No   Do you need help preparing meals?  No   Do you need help with housework?  No   Do you need help with laundry? No   Do you need help taking your medications? No   Do you need help managing money? No   Do you ever drive or ride in a car without wearing a seat belt? No   Have you felt unusual stress, anger or loneliness in the last month? Yes   Who do you live with? Child   If you need help, do you have trouble finding someone available to you? Yes   Have you been bothered in the last four weeks by sexual problems? No   Do you have difficulty concentrating, remembering or making decisions? Yes       Age-appropriate Screening Schedule:  Refer to the list below for future screening recommendations based on patient's age, sex and/or medical conditions. Orders for these recommended tests are listed in the plan section. The patient has been provided with a written plan.    Health Maintenance   Topic Date Due    ZOSTER VACCINE (1 of 2) Never done     HEPATITIS C SCREENING  Never done    PAP SMEAR  Never done    INFLUENZA VACCINE  Never done    ANNUAL WELLNESS VISIT  09/06/2023    BMI FOLLOWUP  09/06/2023    COVID-19 Vaccine (2 - 2023-24 season) 11/30/2023 (Originally 9/1/2023)    TDAP/TD VACCINES (1 - Tdap) 05/30/2024 (Originally 12/16/1978)    Pneumococcal Vaccine 0-64 (1 - PCV) 06/05/2024 (Originally [birth year]/1965)    LIPID PANEL  05/30/2024    LUNG CANCER SCREENING  06/26/2024    MAMMOGRAM  08/04/2025    COLORECTAL CANCER SCREENING  10/01/2030                  CMS Preventative Services Quick Reference  Risk Factors Identified During Encounter  Chronic Pain: Natural history and expected course discussed. Questions answered.  Hearing Problem:  Referral declined  Tobacco Use/Dependance Risk (Cindy Bella  reports that she has been smoking cigarettes. She started smoking about 49 years ago. She has a 30.00 pack-year smoking history. She has never used smokeless tobacco.. I have educated her on the risk of diseases from using tobacco products such as cancer, COPD, and heart disease.     I advised her to quit and she is not willing to quit.    I spent 3  minutes counseling the patient.         The above risks/problems have been discussed with the patient.  Pertinent information has been shared with the patient in the After Visit Summary.  An After Visit Summary and PPPS were made available to the patient.    Follow Up:   Next Medicare Wellness visit to be scheduled in 1 year.       Additional E&M Note during same encounter follows:  Patient has multiple medical problems which are significant and separately identifiable that require additional work above and beyond the Medicare Wellness Visit.      Chief Complaint  Rib Pain and Medicare annual wellness    Subjective        HPI  Cindy Bella is also being seen today for     Left side rib pain and swelling, patient states this is been going on for couple weeks, states she just noticed that it was sticking  "out, is tender to touch, denies any known injury, no abdominal symptoms, no nausea, vomiting, obstipation or otherwise.     Objective   Vital Signs:  /59   Pulse 71   Ht 160 cm (63\")   Wt 43.8 kg (96 lb 9.6 oz)   SpO2 96%   BMI 17.11 kg/m²     Physical Exam  Vitals reviewed.   Constitutional:       Appearance: Normal appearance. She is well-developed.   HENT:      Head: Normocephalic and atraumatic.      Right Ear: External ear normal.      Left Ear: External ear normal.   Eyes:      Conjunctiva/sclera: Conjunctivae normal.      Pupils: Pupils are equal, round, and reactive to light.   Cardiovascular:      Rate and Rhythm: Normal rate and regular rhythm.      Heart sounds: No murmur heard.     No friction rub. No gallop.   Pulmonary:      Effort: Pulmonary effort is normal.      Breath sounds: Normal breath sounds. No wheezing or rhonchi.   Chest:          Comments: Left side of rib cage is noted to be protruding forward somewhat, tender to palpation.  Marked scoliosis noted  Abdominal:      General: There is no distension.      Palpations: There is no mass.      Tenderness: There is no abdominal tenderness.   Skin:     General: Skin is warm and dry.   Neurological:      Mental Status: She is alert and oriented to person, place, and time.      Cranial Nerves: No cranial nerve deficit.   Psychiatric:         Mood and Affect: Mood and affect normal.         Behavior: Behavior normal.         Thought Content: Thought content normal.         Judgment: Judgment normal.          The following data was reviewed by: BROOKLYN Bowen on 10/30/2023:  CMP          4/12/2023    04:52 5/30/2023    07:52 6/5/2023    12:02   CMP   Glucose 107  93     BUN 12  12     Creatinine 0.61  0.66     EGFR 100.6  98.7     Sodium 141  139     Potassium 3.1  3.8     Chloride 100  99     Calcium 10.7  10.8  10.7    Total Protein 7.6  7.1     Albumin 4.2  4.3     Globulin 3.4  2.8     Total Bilirubin 0.4  0.7     Alkaline " Phosphatase 97  77     AST (SGOT) 17  17     ALT (SGPT) 14  17     Albumin/Globulin Ratio 1.2  1.5     BUN/Creatinine Ratio 19.7  18.2     Anion Gap 8.6  12.0       CBC          5/30/2023    07:52 6/5/2023    12:02 10/30/2023    09:43   CBC   WBC 17.89  11.19  8.61    RBC 4.75  4.41  4.49    Hemoglobin 15.1  14.0  14.3    Hematocrit 43.6  41.4  42.9    MCV 91.8  93.9  95.5    MCH 31.8  31.7  31.8    MCHC 34.6  33.8  33.3    RDW 12.7  12.8  13.6    Platelets 268  338  288                 Assessment and Plan   Diagnoses and all orders for this visit:    1. Medicare annual wellness visit, subsequent (Primary)    2. Gastroesophageal reflux disease, unspecified whether esophagitis present  Comments:  Stable on Prilosec, continue current dose    3. Rib pain on left side  Comments:  Discussed possibly due to scoliosis, we will start with x-ray for further evaluation, if negative will seek additional imaging  Orders:  -     XR Ribs Left With PA Chest; Future    4. Swelling abdomen  -     XR Ribs Left With PA Chest; Future    5. Essential hypertension  Assessment & Plan:  Well-controlled at present, continue current medications    Orders:  -     Comprehensive Metabolic Panel; Future  -     CBC & Differential; Future  -     Lipid Panel; Future    6. Thyroid disorder screen  -     TSH Rfx On Abnormal To Free T4; Future    7. Chest wall asymmetry  -     XR Ribs Left With PA Chest; Future    8. Cigarette nicotine dependence without complication  Comments:  Still smoking, not ready to quit             Follow Up   Return in about 3 months (around 1/30/2024), or if symptoms worsen or fail to improve.  Patient was given instructions and counseling regarding her condition or for health maintenance advice. Please see specific information pulled into the AVS if appropriate.

## 2023-10-30 NOTE — PROGRESS NOTES
Chief Complaint  Rib Pain    SUBJECTIVE  Cindy Bella presents to [facility]       History of Present Illness  Pt c/o of rib pain that's been going on for a mo or so. Pt said it's gotten worse over the time. Pt states it's a constant pain.     Pt is due for vaccines and is aware.     Pt due for paps and physical and aware.     Past Medical History:   Diagnosis Date    Anxiety     Arthritis     Brain neoplasm malignant     Depression     Fibromyalgia, primary     HL (hearing loss)     Hyperlipidemia     Hypertension     Irritable bowel syndrome     Low back pain     Scoliosis     SVT (supraventricular tachycardia)     Urethral stricture 2015      Family History   Problem Relation Age of Onset    Hypertension Mother     Stroke Mother     Kidney disease Mother     Hypertension Sister     Stroke Sister     Hypertension Brother     Hypertension Maternal Grandmother     Hypertension Maternal Grandfather     Hypertension Paternal Grandmother         Cerebral hemorrhage    Breast cancer Other     Pancreatic cancer Other     Thyroid cancer Other         Gland    Hyperlipidemia Other     Cancer Other     Coronary artery disease Other     Hypertension Other     Stroke Other     Hypertension Sister     Other Sister         Breast Cancer    Hypertension Sister     Stroke Sister     Hypertension Brother     Other Maternal Aunt     Other Sister       Past Surgical History:   Procedure Laterality Date    APPENDECTOMY      BREAST SURGERY      CARDIAC SURGERY       SECTION      COLONOSCOPY      OOPHORECTOMY Right     UPPER GASTROINTESTINAL ENDOSCOPY          Current Outpatient Medications:     albuterol (ACCUNEB) 0.63 MG/3ML nebulizer solution, Take 3 mL by nebulization Every 6 (Six) Hours As Needed for Wheezing., Disp: 30 each, Rfl: 0    albuterol sulfate  (90 Base) MCG/ACT inhaler, INHALE TWO PUFFS BY MOUTH EVERY 4 HOURS AS NEEDED FOR WHEEZING (BULK), Disp: 20.1 g, Rfl:  "3    amLODIPine (NORVASC) 2.5 MG tablet, Take 1 tablet by mouth Daily., Disp: 90 tablet, Rfl: 1    Aspirin Low Dose 81 MG EC tablet, TAKE ONE TABLET BY MOUTH DAILY AT 9 AM (VIAL), Disp: 90 tablet, Rfl: 11    atorvastatin (LIPITOR) 10 MG tablet, Take 1 tablet by mouth Daily., Disp: 90 tablet, Rfl: 1    busPIRone (BUSPAR) 10 MG tablet, Take 1 tablet by mouth 2 (Two) Times a Day., Disp: 180 tablet, Rfl: 1    citalopram (CeleXA) 40 MG tablet, Take 1 tablet by mouth Daily., Disp: 90 tablet, Rfl: 1    dicyclomine (BENTYL) 10 MG capsule, TAKE TWO CAPSULES BY MOUTH FOUR TIMES DAILY BEFORE MEALS & AT BEDTIME (VIAL), Disp: 120 capsule, Rfl: 2    losartan-hydrochlorothiazide (HYZAAR) 50-12.5 MG per tablet, TAKE ONE TABLET BY MOUTH DAILY (VIAL), Disp: 180 tablet, Rfl: 1    methocarbamol (ROBAXIN) 750 MG tablet, TAKE ONE TABLET BY MOUTH THREE TIMES DAILY PRIOR TO MEALS FOR MUSCLE SPASMS (VIAL), Disp: 90 tablet, Rfl: 0    omeprazole (priLOSEC) 20 MG capsule, Take 1 capsule by mouth Daily., Disp: 90 capsule, Rfl: 1    ondansetron ODT (ZOFRAN-ODT) 4 MG disintegrating tablet, PLACE 1/4 TABLET ON TOP OF TONGUE AS NEEDED FOR NAUSEA AND VOMITING (VIAL), Disp: 90 tablet, Rfl: 0    propranolol (INDERAL) 80 MG tablet, Take 1 tablet by mouth 2 (Two) Times a Day., Disp: 180 tablet, Rfl: 1    Tiotropium Bromide Monohydrate (Spiriva Respimat) 1.25 MCG/ACT aerosol solution inhaler, Inhale 2 puffs Daily., Disp: 12 g, Rfl: 2    traZODone (DESYREL) 100 MG tablet, Take 1 tablet by mouth Every Night., Disp: 90 tablet, Rfl: 1    ipratropium-albuterol (DUO-NEB) 0.5-2.5 mg/3 ml nebulizer, Take 3 mL by nebulization 3 (Three) Times a Day As Needed for Wheezing., Disp: 810 mL, Rfl: 1    OBJECTIVE  Vital Signs:   /59   Pulse 71   Ht 160 cm (63\")   Wt 43.8 kg (96 lb 9.6 oz)   SpO2 96%   BMI 17.11 kg/m²    Estimated body mass index is 17.11 kg/m² as calculated from the following:    Height as of this encounter: 160 cm (63\").    Weight as of this " encounter: 43.8 kg (96 lb 9.6 oz).     Wt Readings from Last 3 Encounters:   10/30/23 43.8 kg (96 lb 9.6 oz)   05/30/23 43.2 kg (95 lb 3.2 oz)   04/12/23 42 kg (92 lb 9.5 oz)     BP Readings from Last 3 Encounters:   10/30/23 120/59   05/30/23 125/71   04/12/23 141/92       Physical Exam     Result Review    {[facility] (Optional):58239}    Mammo Diagnostic Digital Tomosynthesis Left With CAD    Result Date: 8/4/2023   Benign left mammogram and focused left breast ultrasound.  RECOMMENDATION(S):  ROUTINE MAMMOGRAM AND CLINICAL EVALUATION IN 12 MONTHS.   BIRADS:  DIAGNOSTIC CATEGORY 2--BENIGN FINDING   BREAST COMPOSITION: Heterogeneously dense,which may obscure small masses.  PLEASE NOTE:  A NORMAL MAMMOGRAM DOES NOT EXCLUDE THE POSSIBILITY OF BREAST CANCER. ANY CLINICALLY SUSPICIOUS PALPABLE LUMP SHOULD BE BIOPSIED.      ALEJANDRO GARZA MD       Electronically Signed and Approved By: ALEJANDRO GARZA MD on 8/04/2023 at 12:52             Mammo Screening Digital Tomosynthesis Bilateral With CAD    Result Date: 7/24/2023  1. Left breast asymmetry seen only on the MLO view.  Recommend left diagnostic mammogram with possible limited left breast ultrasound. 2. No mammographic evidence of malignancy within the right breast.   RECOMMENDATION(S):  ADDITIONAL MAMMOGRAPHIC VIEWS REQUIRED: LEFT BREAST  --NEED ADDITIONAL IMAGING EVALUATION.   ULTRASOUND: LEFT BREAST  --NEED ADDITIONAL IMAGING EVALUATION.   BIRADS:  DIAGNOSTIC CATEGORY 0--INCOMPLETE: NEEDS ADDITIONAL IMAGING EVALUATION.   BREAST COMPOSITION: Heterogeneously dense,which may obscure small masses.  PLEASE NOTE:  A NORMAL MAMMOGRAM DOES NOT EXCLUDE THE POSSIBILITY OF BREAST CANCER. ANY CLINICALLY SUSPICIOUS PALPABLE LUMP SHOULD BE BIOPSIED.      GIGI DEVINE MD       Electronically Signed and Approved By: GIGI DEVINE MD on 7/24/2023 at 9:02                The above data has been reviewed by Lanette Lawrence MA 10/30/2023 08:15 EDT.           Patient Care Team:  Tina Hartley APRN as PCP - General (Nurse Practitioner)    {BMI is below normal parameters (malnutrition). Recommendations:82965}       ASSESSMENT & PLAN    Diagnoses and all orders for this visit:    1. Gastroesophageal reflux disease, unspecified whether esophagitis present         Tobacco Use: High Risk (10/30/2023)    Patient History     Smoking Tobacco Use: Every Day     Smokeless Tobacco Use: Never     Passive Exposure: Not on file       Follow Up     No follow-ups on file.    {Time Spent (Optional):74339}    Patient was given instructions and counseling regarding her condition or for health maintenance advice. Please see specific information pulled into the AVS if appropriate.   I have reviewed information obtained and documented by others and I have confirmed the accuracy of this documented note.    Lanette Lawrence MA

## 2023-11-09 ENCOUNTER — TELEPHONE (OUTPATIENT)
Dept: FAMILY MEDICINE CLINIC | Facility: CLINIC | Age: 63
End: 2023-11-09
Payer: MEDICARE

## 2023-11-09 DIAGNOSIS — K21.9 GASTROESOPHAGEAL REFLUX DISEASE, UNSPECIFIED WHETHER ESOPHAGITIS PRESENT: ICD-10-CM

## 2023-11-09 NOTE — TELEPHONE ENCOUNTER
Caller: JOSE    Relationship to patient: [facility]    Best call back number: [phone]    Patient is needing: JOSE IS CALLING FOR CLARIFICATION ON [facility]REQU[facility]. PLEASE CALL TO ADVISE.

## 2023-11-10 RX ORDER — ONDANSETRON 4 MG/1
4 TABLET, ORALLY DISINTEGRATING ORAL EVERY 8 HOURS PRN
Qty: 90 TABLET | Refills: 0 | Status: SHIPPED | OUTPATIENT
Start: 2023-11-10

## 2023-11-14 DIAGNOSIS — E78.2 MIXED HYPERLIPIDEMIA: ICD-10-CM

## 2023-11-14 RX ORDER — ATORVASTATIN CALCIUM 10 MG/1
TABLET, FILM COATED ORAL
Qty: 90 TABLET | Refills: 0 | Status: SHIPPED | OUTPATIENT
Start: 2023-11-14

## 2023-11-16 DIAGNOSIS — I10 ESSENTIAL HYPERTENSION: ICD-10-CM

## 2023-11-16 RX ORDER — PROPRANOLOL HYDROCHLORIDE 80 MG/1
TABLET ORAL
Qty: 180 TABLET | Refills: 0 | Status: SHIPPED | OUTPATIENT
Start: 2023-11-16

## 2023-11-27 ENCOUNTER — EXTERNAL PBMM DATA (OUTPATIENT)
Dept: PHARMACY | Facility: OTHER | Age: 63
End: 2023-11-27
Payer: MEDICARE

## 2023-11-30 DIAGNOSIS — R07.89 CHEST WALL PAIN: ICD-10-CM

## 2023-11-30 DIAGNOSIS — R22.2 SOFT TISSUE SWELLING OF CHEST WALL: ICD-10-CM

## 2023-11-30 DIAGNOSIS — R07.81 RIB PAIN ON LEFT SIDE: Primary | ICD-10-CM

## 2023-12-06 ENCOUNTER — POP HEALTH PHARMACY (OUTPATIENT)
Dept: PHARMACY | Facility: OTHER | Age: 63
End: 2023-12-06
Payer: MEDICARE

## 2023-12-06 DIAGNOSIS — E78.2 MIXED HYPERLIPIDEMIA: ICD-10-CM

## 2023-12-06 DIAGNOSIS — I10 ESSENTIAL HYPERTENSION: ICD-10-CM

## 2023-12-06 DIAGNOSIS — F41.9 ANXIETY: ICD-10-CM

## 2023-12-06 DIAGNOSIS — K21.9 GASTROESOPHAGEAL REFLUX DISEASE, UNSPECIFIED WHETHER ESOPHAGITIS PRESENT: ICD-10-CM

## 2023-12-06 DIAGNOSIS — G47.00 INSOMNIA, UNSPECIFIED TYPE: ICD-10-CM

## 2023-12-06 RX ORDER — ATORVASTATIN CALCIUM 10 MG/1
10 TABLET, FILM COATED ORAL NIGHTLY
Qty: 90 TABLET | Refills: 0 | Status: SHIPPED | OUTPATIENT
Start: 2023-12-06

## 2023-12-06 RX ORDER — OMEPRAZOLE 20 MG/1
20 CAPSULE, DELAYED RELEASE ORAL DAILY
Qty: 90 CAPSULE | Refills: 0 | Status: SHIPPED | OUTPATIENT
Start: 2023-12-06

## 2023-12-06 RX ORDER — AMLODIPINE BESYLATE 2.5 MG/1
2.5 TABLET ORAL DAILY
Qty: 90 TABLET | Refills: 0 | Status: SHIPPED | OUTPATIENT
Start: 2023-12-06

## 2023-12-06 RX ORDER — PROPRANOLOL HYDROCHLORIDE 80 MG/1
80 TABLET ORAL 2 TIMES DAILY
Qty: 180 TABLET | Refills: 0 | Status: SHIPPED | OUTPATIENT
Start: 2023-12-06

## 2023-12-06 NOTE — TELEPHONE ENCOUNTER
Caller: [facility] - [place], [place] - [address] - [phone] [facility] [phone] FX    Relationship: Pharmacy      Requested Prescriptions:   Requested Prescriptions     Pending Prescriptions Disp Refills    omeprazole (priLOSEC) 20 MG capsule 90 capsule 1     Sig: Take 1 capsule by mouth Daily.    propranolol (INDERAL) 80 MG tablet 180 tablet 0    citalopram (CeleXA) 40 MG tablet 90 tablet 1     Sig: Take 1 tablet by mouth Daily.    amLODIPine (NORVASC) 2.5 MG tablet 90 tablet 1     Sig: Take 1 tablet by mouth Daily.    methocarbamol (ROBAXIN) 750 MG tablet 90 tablet 0    traZODone (DESYREL) 100 MG tablet 90 tablet 1     Sig: Take 1 tablet by mouth Every Night.    atorvastatin (LIPITOR) 10 MG tablet 90 tablet 0        Pharmacy where request should be sent: [facility] - [place], [place] - [address] - [phone] [facility] [phone] FX     Last office visit with prescribing clinician: 10/30/2023   Last telemedicine visit with prescribing clinician: Visit date not found   Next office visit with prescribing clinician: 12/28/2023       Tiffanie Bond Rep   12/06/23 11:48 EST

## 2023-12-07 RX ORDER — METHOCARBAMOL 750 MG/1
750 TABLET, FILM COATED ORAL 3 TIMES DAILY
Qty: 90 TABLET | Refills: 0 | Status: SHIPPED | OUTPATIENT
Start: 2023-12-07

## 2023-12-07 RX ORDER — CITALOPRAM 40 MG/1
40 TABLET ORAL DAILY
Qty: 90 TABLET | Refills: 0 | Status: SHIPPED | OUTPATIENT
Start: 2023-12-07

## 2023-12-07 RX ORDER — TRAZODONE HYDROCHLORIDE 100 MG/1
100 TABLET ORAL NIGHTLY
Qty: 90 TABLET | Refills: 1 | Status: SHIPPED | OUTPATIENT
Start: 2023-12-07

## 2023-12-11 DIAGNOSIS — F41.9 ANXIETY: ICD-10-CM

## 2023-12-11 RX ORDER — BUSPIRONE HYDROCHLORIDE 10 MG/1
TABLET ORAL
Qty: 180 TABLET | Refills: 0 | Status: SHIPPED | OUTPATIENT
Start: 2023-12-11

## 2023-12-13 DIAGNOSIS — K21.9 GASTROESOPHAGEAL REFLUX DISEASE, UNSPECIFIED WHETHER ESOPHAGITIS PRESENT: ICD-10-CM

## 2023-12-13 RX ORDER — METHOCARBAMOL 750 MG/1
750 TABLET, FILM COATED ORAL 3 TIMES DAILY
Qty: 90 TABLET | Refills: 11 | OUTPATIENT
Start: 2023-12-13

## 2023-12-13 RX ORDER — ONDANSETRON 4 MG/1
TABLET, ORALLY DISINTEGRATING ORAL
Qty: 90 TABLET | Refills: 1 | Status: SHIPPED | OUTPATIENT
Start: 2023-12-13

## 2024-01-03 ENCOUNTER — OFFICE VISIT (OUTPATIENT)
Dept: INTERNAL MEDICINE | Facility: CLINIC | Age: 64
End: 2024-01-03
Payer: MEDICARE

## 2024-01-03 VITALS
SYSTOLIC BLOOD PRESSURE: 90 MMHG | TEMPERATURE: 98.6 F | OXYGEN SATURATION: 92 % | WEIGHT: 95 LBS | HEIGHT: 63 IN | BODY MASS INDEX: 16.83 KG/M2 | DIASTOLIC BLOOD PRESSURE: 59 MMHG | HEART RATE: 61 BPM

## 2024-01-03 DIAGNOSIS — G47.00 INSOMNIA, UNSPECIFIED TYPE: Chronic | ICD-10-CM

## 2024-01-03 DIAGNOSIS — F33.41 RECURRENT MAJOR DEPRESSIVE DISORDER, IN PARTIAL REMISSION: ICD-10-CM

## 2024-01-03 DIAGNOSIS — F17.218 CIGARETTE NICOTINE DEPENDENCE WITH OTHER NICOTINE-INDUCED DISORDER: ICD-10-CM

## 2024-01-03 DIAGNOSIS — Z00.00 ENCOUNTER FOR MEDICAL EXAMINATION TO ESTABLISH CARE: Primary | ICD-10-CM

## 2024-01-03 DIAGNOSIS — F41.1 GAD (GENERALIZED ANXIETY DISORDER): Chronic | ICD-10-CM

## 2024-01-03 DIAGNOSIS — I10 ESSENTIAL HYPERTENSION: ICD-10-CM

## 2024-01-03 DIAGNOSIS — C71.9 MALIGNANT NEOPLASM OF BRAIN, UNSPECIFIED LOCATION: ICD-10-CM

## 2024-01-03 DIAGNOSIS — K21.9 GASTROESOPHAGEAL REFLUX DISEASE, UNSPECIFIED WHETHER ESOPHAGITIS PRESENT: ICD-10-CM

## 2024-01-03 DIAGNOSIS — Z11.59 NEED FOR HEPATITIS C SCREENING TEST: ICD-10-CM

## 2024-01-03 DIAGNOSIS — F17.210 CIGARETTE NICOTINE DEPENDENCE WITHOUT COMPLICATION: ICD-10-CM

## 2024-01-03 DIAGNOSIS — G89.29 CHRONIC LOW BACK PAIN WITHOUT SCIATICA, UNSPECIFIED BACK PAIN LATERALITY: ICD-10-CM

## 2024-01-03 DIAGNOSIS — M54.50 CHRONIC LOW BACK PAIN WITHOUT SCIATICA, UNSPECIFIED BACK PAIN LATERALITY: ICD-10-CM

## 2024-01-03 DIAGNOSIS — Z23 INFLUENZA VACCINE NEEDED: ICD-10-CM

## 2024-01-03 DIAGNOSIS — E78.2 MIXED HYPERLIPIDEMIA: ICD-10-CM

## 2024-01-03 DIAGNOSIS — J43.9 PULMONARY EMPHYSEMA, UNSPECIFIED EMPHYSEMA TYPE: ICD-10-CM

## 2024-01-03 DIAGNOSIS — M41.20 IDIOPATHIC SCOLIOSIS AND KYPHOSCOLIOSIS: Chronic | ICD-10-CM

## 2024-01-03 DIAGNOSIS — R19.7 DIARRHEA, UNSPECIFIED TYPE: ICD-10-CM

## 2024-01-03 DIAGNOSIS — Z23 NEED FOR VACCINATION: ICD-10-CM

## 2024-01-03 DIAGNOSIS — R15.2 FECAL URGENCY: ICD-10-CM

## 2024-01-03 DIAGNOSIS — R22.2 CHEST WALL MASS: ICD-10-CM

## 2024-01-03 DIAGNOSIS — M62.838 MUSCLE SPASM: Chronic | ICD-10-CM

## 2024-01-03 PROBLEM — F17.200 NICOTINE DEPENDENCE: Status: ACTIVE | Noted: 2023-05-30

## 2024-01-03 LAB
ALBUMIN SERPL-MCNC: 4.5 G/DL (ref 3.5–5.2)
ALBUMIN/GLOB SERPL: 1.7 G/DL
ALP SERPL-CCNC: 70 U/L (ref 39–117)
ALT SERPL W P-5'-P-CCNC: 14 U/L (ref 1–33)
ANION GAP SERPL CALCULATED.3IONS-SCNC: 10 MMOL/L (ref 5–15)
AST SERPL-CCNC: 20 U/L (ref 1–32)
BASOPHILS # BLD AUTO: 0.05 10*3/MM3 (ref 0–0.2)
BASOPHILS NFR BLD AUTO: 0.5 % (ref 0–1.5)
BILIRUB SERPL-MCNC: 0.4 MG/DL (ref 0–1.2)
BUN SERPL-MCNC: 10 MG/DL (ref 8–23)
BUN/CREAT SERPL: 16.7 (ref 7–25)
CALCIUM SPEC-SCNC: 10.4 MG/DL (ref 8.6–10.5)
CHLORIDE SERPL-SCNC: 100 MMOL/L (ref 98–107)
CHOLEST SERPL-MCNC: 153 MG/DL (ref 0–200)
CO2 SERPL-SCNC: 29 MMOL/L (ref 22–29)
CREAT SERPL-MCNC: 0.6 MG/DL (ref 0.57–1)
DEPRECATED RDW RBC AUTO: 41.5 FL (ref 37–54)
EGFRCR SERPLBLD CKD-EPI 2021: 100.4 ML/MIN/1.73
EOSINOPHIL # BLD AUTO: 0.11 10*3/MM3 (ref 0–0.4)
EOSINOPHIL NFR BLD AUTO: 1.2 % (ref 0.3–6.2)
ERYTHROCYTE [DISTWIDTH] IN BLOOD BY AUTOMATED COUNT: 12.3 % (ref 12.3–15.4)
GLOBULIN UR ELPH-MCNC: 2.6 GM/DL
GLUCOSE SERPL-MCNC: 85 MG/DL (ref 65–99)
HCT VFR BLD AUTO: 42.6 % (ref 34–46.6)
HDLC SERPL-MCNC: 57 MG/DL (ref 40–60)
HGB BLD-MCNC: 14 G/DL (ref 12–15.9)
IMM GRANULOCYTES # BLD AUTO: 0.03 10*3/MM3 (ref 0–0.05)
IMM GRANULOCYTES NFR BLD AUTO: 0.3 % (ref 0–0.5)
LDLC SERPL CALC-MCNC: 77 MG/DL (ref 0–100)
LDLC/HDLC SERPL: 1.32 {RATIO}
LYMPHOCYTES # BLD AUTO: 4.09 10*3/MM3 (ref 0.7–3.1)
LYMPHOCYTES NFR BLD AUTO: 44.3 % (ref 19.6–45.3)
MCH RBC QN AUTO: 30.6 PG (ref 26.6–33)
MCHC RBC AUTO-ENTMCNC: 32.9 G/DL (ref 31.5–35.7)
MCV RBC AUTO: 93 FL (ref 79–97)
MONOCYTES # BLD AUTO: 0.44 10*3/MM3 (ref 0.1–0.9)
MONOCYTES NFR BLD AUTO: 4.8 % (ref 5–12)
NEUTROPHILS NFR BLD AUTO: 4.52 10*3/MM3 (ref 1.7–7)
NEUTROPHILS NFR BLD AUTO: 48.9 % (ref 42.7–76)
NRBC BLD AUTO-RTO: 0 /100 WBC (ref 0–0.2)
PLATELET # BLD AUTO: 310 10*3/MM3 (ref 140–450)
PMV BLD AUTO: 10.4 FL (ref 6–12)
POTASSIUM SERPL-SCNC: 3.8 MMOL/L (ref 3.5–5.2)
PROT SERPL-MCNC: 7.1 G/DL (ref 6–8.5)
RBC # BLD AUTO: 4.58 10*6/MM3 (ref 3.77–5.28)
SODIUM SERPL-SCNC: 139 MMOL/L (ref 136–145)
TRIGL SERPL-MCNC: 103 MG/DL (ref 0–150)
VLDLC SERPL-MCNC: 19 MG/DL (ref 5–40)
WBC NRBC COR # BLD AUTO: 9.24 10*3/MM3 (ref 3.4–10.8)

## 2024-01-03 PROCEDURE — 85025 COMPLETE CBC W/AUTO DIFF WBC: CPT | Performed by: NURSE PRACTITIONER

## 2024-01-03 PROCEDURE — 80061 LIPID PANEL: CPT | Performed by: NURSE PRACTITIONER

## 2024-01-03 PROCEDURE — 86803 HEPATITIS C AB TEST: CPT | Performed by: NURSE PRACTITIONER

## 2024-01-03 PROCEDURE — 80053 COMPREHEN METABOLIC PANEL: CPT | Performed by: NURSE PRACTITIONER

## 2024-01-03 RX ORDER — BUDESONIDE, GLYCOPYRROLATE, AND FORMOTEROL FUMARATE 160; 9; 4.8 UG/1; UG/1; UG/1
2 AEROSOL, METERED RESPIRATORY (INHALATION) 2 TIMES DAILY
Qty: 10.7 G | Refills: 0 | Status: SHIPPED | OUTPATIENT
Start: 2024-01-03

## 2024-01-03 RX ORDER — LOSARTAN POTASSIUM AND HYDROCHLOROTHIAZIDE 12.5; 5 MG/1; MG/1
1 TABLET ORAL 2 TIMES DAILY
Qty: 180 TABLET | Refills: 1 | Status: SHIPPED | OUTPATIENT
Start: 2024-01-03

## 2024-01-03 RX ORDER — OMEPRAZOLE 20 MG/1
20 CAPSULE, DELAYED RELEASE ORAL 2 TIMES DAILY
Qty: 180 CAPSULE | Refills: 1 | Status: SHIPPED | OUTPATIENT
Start: 2024-01-03 | End: 2024-01-03 | Stop reason: SDUPTHER

## 2024-01-03 RX ORDER — OMEPRAZOLE 20 MG/1
20 CAPSULE, DELAYED RELEASE ORAL 2 TIMES DAILY
Qty: 180 CAPSULE | Refills: 1 | Status: SHIPPED | OUTPATIENT
Start: 2024-01-03

## 2024-01-03 RX ORDER — BUSPIRONE HYDROCHLORIDE 15 MG/1
15 TABLET ORAL 3 TIMES DAILY
Qty: 90 TABLET | Refills: 1 | Status: SHIPPED | OUTPATIENT
Start: 2024-01-03

## 2024-01-03 NOTE — PROGRESS NOTES
Chief Complaint  Establish Care and Weight Loss (Patient is unsure why she has lost so much weight. She states it is concerning. )    Subjective          Cindy Bella presents to [facility]  Hyperlipidemia  This is a chronic problem. The problem is controlled. She has no history of chronic renal disease, diabetes, hypothyroidism, liver disease, obesity or nephrotic syndrome. Pertinent negatives include no chest pain, focal sensory loss, focal weakness, leg pain, myalgias or shortness of breath. Compliance problems include adherence to diet and adherence to exercise.    Hypertension  This is a chronic problem. Associated symptoms include anxiety. Pertinent negatives include no blurred vision, chest pain, headaches, malaise/fatigue, neck pain, orthopnea, palpitations, peripheral edema, PND, shortness of breath or sweats. Compliance problems include diet and exercise.  There is no history of angina, kidney disease or CAD/MI. There is no history of chronic renal disease.   Anxiety  Symptoms include irritability. Patient reports no chest pain, decreased concentration, insomnia, palpitations or shortness of breath.     Her past medical history is significant for depression.   DepressionPatient presents with the following symptoms: irritability.  Patient is not experiencing: decreased concentration, insomnia, palpitations, shortness of breath and weight loss.      Diarrhea   This is a recurrent problem. The stool consistency is described as Watery. Pertinent negatives include no abdominal pain, arthralgias, bloating, chills, coughing, fever, headaches, increased  flatus, myalgias, sweats, URI, vomiting or weight loss. Treatments tried: dicyclomine. caused constipation - has not been taking it.   COPD  There is no chest tightness, cough, difficulty breathing, frequent throat clearing, hemoptysis or shortness of breath. Pertinent negatives include no chest pain, fever,  headaches, malaise/fatigue, myalgias, PND, sore throat, sweats or weight loss.   Heartburn  She reports no abdominal pain, no chest pain, no coughing or no sore throat. Pertinent negatives include no fatigue or weight loss.   Nicotine Dependence  Presents for initial visit. Symptoms include cravings and irritability. Symptoms are negative for decreased concentration, fatigue, headache, insomnia and sore throat. Preferred tobacco types include cigarettes. Preferred cigarette types include filtered. Preferred strength is light. Preferred cigarettes are non-menthol. Preferred brands include [place]. Number of cigarettes per day: between 1/2-3/4 pack per day. Age when first smoked: 20. Treatments tried: quit during pregnancies. Cindy is ready to quit.       Previous PCP: BROOKLYN Koehler  Specialist(s): Dr. Balderas (Neurologist), Dr. David ([facility])  COVID vaccine: Patient refused   Pneumonia vaccine: Patient would like to do today  Shingles vaccine: Patient is not eligible to get in clinic but made aware to go to pharmacy.   Colon cancer screenin2020  Mammogram: 2023  Pap Smear: 2 years ago. Dr. Manning told her she was done after that.   DEXA/Bone Density: Never done.     Patient's medical history states she has malignant neoplasm of brain and patient states that she does not have brain cancer but she does have a brain tumor but was told it was benign.     Migraines: propranolol     Lost 50+ lbs in 6 months without trying.     Smokes weed and feels hungry       Current Outpatient Medications   Medication Instructions    albuterol (ACCUNEB) 0.63 mg, Nebulization, Every 6 Hours PRN    albuterol sulfate  (90 Base) MCG/ACT inhaler INHALE TWO PUFFS BY MOUTH EVERY 4 HOURS AS NEEDED FOR WHEEZING (BULK)    amLODIPine (NORVASC) 2.5 mg, Oral, Daily    Aspirin Low Dose 81 MG EC tablet TAKE ONE TABLET BY MOUTH DAILY AT 9 AM (VIAL)    atorvastatin (LIPITOR) 10 mg, Oral, Nightly     "Budeson-Glycopyrrol-Formoterol (Breztri Aerosphere) 160-9-4.8 MCG/ACT aerosol inhaler 2 puffs, Inhalation, 2 Times Daily    busPIRone (BUSPAR) 15 mg, Oral, 3 Times Daily    citalopram (CELEXA) 40 mg, Oral, Daily    dicyclomine (BENTYL) 10 MG capsule TAKE TWO CAPSULES BY MOUTH FOUR TIMES DAILY BEFORE MEALS & AT BEDTIME (VIAL)    ipratropium-albuterol (DUO-NEB) 0.5-2.5 mg/3 ml nebulizer 3 mL, Nebulization, 3 Times Daily PRN    losartan-hydrochlorothiazide (HYZAAR) 50-12.5 MG per tablet 1 tablet, Oral, 2 Times Daily    methocarbamol (ROBAXIN) 750 mg, Oral, 3 Times Daily    nicotine (NICODERM CQ) 21 MG/24HR patch 1 patch, Transdermal, Every 24 Hours    nicotine polacrilex (NICORETTE) 2 mg, Mouth/Throat, As Needed    omeprazole (PRILOSEC) 20 mg, Oral, 2 Times Daily    ondansetron ODT (ZOFRAN-ODT) 4 MG disintegrating tablet PLACE ONE TABLET ON TOP OF TONGUE EVERY 8 HOURS AS NEEDED FOR NAUSEA AND VOMITING    propranolol (INDERAL) 80 mg, Oral, 2 Times Daily    traZODone (DESYREL) 100 mg, Oral, Nightly       The following portions of the patient's history were reviewed and updated as appropriate: allergies, current medications, past family history, past medical history, past social history, past surgical history, and problem list.    Objective   Vital Signs:   BP 90/59 (BP Location: Left arm, Patient Position: Sitting, Cuff Size: Adult)   Pulse 61   Temp 98.6 °F (37 °C) (Temporal)   Ht 160 cm (63\")   Wt 43.1 kg (95 lb)   SpO2 92%   BMI 16.83 kg/m²     BP Readings from Last 3 Encounters:   01/03/24 90/59   10/30/23 120/59   05/30/23 125/71     Wt Readings from Last 3 Encounters:   01/03/24 43.1 kg (95 lb)   10/30/23 43.8 kg (96 lb 9.6 oz)   05/30/23 43.2 kg (95 lb 3.2 oz)         Physical Exam  Constitutional:       Appearance: She is overweight.          Appearance: No acute distress, well-nourished  Head: normocephalic, atraumatic  Eyes: extraocular movements intact, no scleral icterus, no conjunctival " injection  Ears, Nose, and Throat: external ears normal, nares patent, moist mucous membranes  Cardiovascular: regular rate and rhythm. no murmurs, rubs, or gallops. no edema  Respiratory: breathing comfortably, symmetric chest rise, clear to auscultation bilaterally. No wheezes, rales, or rhonchi.  Neuro: alert and oriented to time, place, and person. Normal gait  Psych: normal mood and affect     Result Review :   The following data was reviewed by: BROOKLYN Guillaume on 01/03/2024:  Common labs          6/5/2023    12:02 10/30/2023    09:43 1/3/2024    12:54   Common Labs   Glucose  94  85    BUN  10  10    Creatinine  0.80  0.60    Sodium  141  139    Potassium  3.4  3.8    Chloride  102  100    Calcium 10.7  10.5  10.4    Albumin  4.3  4.5    Total Bilirubin  0.5  0.4    Alkaline Phosphatase  70  70    AST (SGOT)  17  20    ALT (SGPT)  11  14    WBC 11.19  8.61  9.24    Hemoglobin 14.0  14.3  14.0    Hematocrit 41.4  42.9  42.6    Platelets 338  288  310    Total Cholesterol  144  153    Triglycerides  143  103    HDL Cholesterol  49  57    LDL Cholesterol   70  77             CT Chest Low Dose Cancer Screening WO (06/26/2023 14:13)   XR Ribs Left With PA Chest (10/30/2023 09:09)   Mammo Diagnostic Digital Tomosynthesis Left With CAD (08/04/2023 12:30)   US Breast Left Limited (08/04/2023 12:55)   Duplex Carotid Ultrasound CAR (02/02/2023 11:32)   CT Head With & Without Contrast (02/17/2023 14:21)   US aaa screen limited (09/03/2021 08:02)   Lab Results   Component Value Date    SARSANTIGEN Not Detected 01/24/2023    COVID19 NOT DETECTED 10/30/2020    FLUAAG Not Detected 01/24/2023    FLUBAG Not Detected 01/24/2023       Procedures        Assessment and Plan    Diagnoses and all orders for this visit:    1. Encounter for medical examination to establish care (Primary)    2. Need for hepatitis C screening test  -     HCV Antibody Rfx To Qnt PCR    3. Essential hypertension  Comments:  keep BP log at home  x1-2 weeks and send me readings ; if remains low will d/c 2.5 amlodipine.  Orders:  -     losartan-hydrochlorothiazide (HYZAAR) 50-12.5 MG per tablet; Take 1 tablet by mouth 2 (Two) Times a Day.  Dispense: 180 tablet; Refill: 1  -     Comprehensive Metabolic Panel  -     CBC & Differential    4. Gastroesophageal reflux disease, unspecified whether esophagitis present  Overview:  Stable on Prilosec, continue current dose    Orders:  -     Discontinue: omeprazole (priLOSEC) 20 MG capsule; Take 1 capsule by mouth 2 (Two) Times a Day.  Dispense: 180 capsule; Refill: 1  -     Comprehensive Metabolic Panel  -     CBC & Differential  -     omeprazole (priLOSEC) 20 MG capsule; Take 1 capsule by mouth 2 (Two) Times a Day.  Dispense: 180 capsule; Refill: 1    5. Need for vaccination  -     Pneumococcal Conjugate Vaccine 20-Valent (PCV20)    6. Influenza vaccine needed  -     Fluzone (or Fluarix & Flulaval for VFC) >6mos    7. Mixed hyperlipidemia  Comments:  thinks 10 gm script is oncorrect. will recheck lipids today and determine correct dose  Orders:  -     Lipid Panel  -     Comprehensive Metabolic Panel  -     CBC & Differential    8. Cigarette nicotine dependence without complication  -     Comprehensive Metabolic Panel  -     CBC & Differential  -     Budeson-Glycopyrrol-Formoterol (Breztri Aerosphere) 160-9-4.8 MCG/ACT aerosol inhaler; Inhale 2 puffs 2 (Two) Times a Day.  Dispense: 10.7 g; Refill: 0  -     nicotine (NICODERM CQ) 21 MG/24HR patch; Place 1 patch on the skin as directed by provider Daily.  Dispense: 28 each; Refill: 1  -     nicotine polacrilex (NICORETTE) 2 MG gum; Chew 1 each As Needed for Smoking Cessation.  Dispense: 150 each; Refill: 1    9. Chronic low back pain without sciatica, unspecified back pain laterality    10. Pulmonary emphysema, unspecified emphysema type  -     Complete PFT - Pre & Post Bronchodilator; Future    11. Diarrhea, unspecified type  -     Ambulatory Referral to  Gastroenterology  -     Enteric Bacterial Panel - Stool, Per Rectum; Future  -     Enteric Parasite Panel - Stool, Per Rectum; Future  -     Clostridioides difficile Toxin, PCR - Stool, Per Rectum; Future    12. Fecal urgency  -     Ambulatory Referral to Gastroenterology  -     Enteric Bacterial Panel - Stool, Per Rectum; Future  -     Enteric Parasite Panel - Stool, Per Rectum; Future  -     Clostridioides difficile Toxin, PCR - Stool, Per Rectum; Future    13. Recurrent major depressive disorder, in partial remission    14. CHRISS (generalized anxiety disorder)  Comments:  uncontrolled; increasing buspar today  Overview:  uncontrolled; increasing buspar today    Orders:  -     busPIRone (BUSPAR) 15 MG tablet; Take 1 tablet by mouth 3 (Three) Times a Day.  Dispense: 90 tablet; Refill: 1    15. Insomnia, unspecified type  Comments:  well controlled on current regimen; sleep hygeine    16. Muscle spasm  Comments:  robaxin works well for her  Orders:  -     Ambulatory Referral to Spine Surgery    17. Idiopathic scoliosis and kyphoscoliosis  Comments:  has been to PT without relief; Moderate levo convex thoracolumbar curvature measures about 35 degrees.  Orders:  -     Ambulatory Referral to Spine Surgery    18. Chest wall mass  Comments:  left; has CT scheduled to evaluate, possibly from scoliosis. Will look at scan when returns.    19. Cigarette nicotine dependence with other nicotine-induced disorder    20. Malignant neoplasm of brain, unspecified location  Comments:  patient denies this diagnosis, will request neurology records.      - will send nicotine patch and gum   - breztri samples     Cindy SOCORRO Bella  reports that she has been smoking cigarettes. She started smoking about 49 years ago. She has a 30.00 pack-year smoking history. She has never used smokeless tobacco.. I have educated her on the risk of diseases from using tobacco products such as cancer, COPD, heart disease, and cataracts.     I advised her to  quit and she is willing to quit. We have discussed the following method/s for tobacco cessation:  Education Material Prescription Medicaiton.  Together we have set a quit date for 1 month from today.  She will follow up with me in 1 month or sooner to check on her progress.    I spent 5 minutes counseling the patient.         Medications Discontinued During This Encounter   Medication Reason    losartan-hydrochlorothiazide (HYZAAR) 50-12.5 MG per tablet Reorder    omeprazole (priLOSEC) 20 MG capsule Reorder    Tiotropium Bromide Monohydrate (Spiriva Respimat) 1.25 MCG/ACT aerosol solution inhaler Historical Med - Therapy completed    busPIRone (BUSPAR) 10 MG tablet     omeprazole (priLOSEC) 20 MG capsule Reorder        I spent 45 minutes caring for Cindy on this date of service. This time includes time spent by me in the following activities:preparing for the visit, reviewing tests, obtaining and/or reviewing a separately obtained history, performing a medically appropriate examination and/or evaluation , counseling and educating the patient/family/caregiver, ordering medications, tests, or procedures, referring and communicating with other health care professionals , documenting information in the medical record, independently interpreting results and communicating that information with the patient/family/caregiver, and care coordination  Follow Up   Return in about 4 weeks (around 1/31/2024) for Hypertension; breztri samples .  Patient was given instructions and counseling regarding her condition or for health maintenance advice. Please see specific information pulled into the AVS if appropriate.       Alivia Burgess, APRN  01/05/24  07:53 EST

## 2024-01-03 NOTE — LETTER
[facility]  Vaccine Consent Form    Patient Name:  Cindy Bella  Patient :  1960     Vaccine(s) Ordered    Pneumococcal Conjugate Vaccine 20-Valent (PCV20)  Fluzone (or Fluarix & Flulaval for VFC) >6mos        Screening Checklist  The following questions should be completed prior to vaccination. If you answer “yes” to any question, it does not necessarily mean you should not be vaccinated. It just means we may need to clarify or ask more questions. If a question is unclear, please ask your healthcare provider to explain it.    Yes No   Any fever or moderate to severe illness today (mild illness and/or antibiotic treatment are not contraindications)?     Do you have a history of a serious reaction to any previous vaccinations, such as anaphylaxis, encephalopathy within 7 days, Guillain-[place] syndrome within 6 weeks, seizure?     Have you received any live vaccine(s) (e.g MMR, CHUCKY) or any other vaccines in the last month (to ensure duplicate doses aren't given)?     Do you have an anaphylactic allergy to latex (DTaP, DTaP-IPV, Hep A, Hep B, MenB, RV, Td, Tdap), baker’s yeast (Hep B, HPV), polysorbates (RSV, nirsevimab, PCV 20, Rotavirrus, Tdap, Shingrix), or gelatin (CHUCKY, MMR)?     Do you have an anaphylactic allergy to neomycin (Rabies, CHUCKY, MMR, IPV, Hep A), polymyxin B (IPV), or streptomycin (IPV)?      Any cancer, leukemia, AIDS, or other immune system disorder? (CHUCKY, MMR, RV)     Do you have a parent, brother, or sister with an immune system problem (if immune competence of vaccine recipient clinically verified, can proceed)? (MMR, CHUCKY)     Any recent steroid treatments for >2 weeks, chemotherapy, or radiation treatment? (CHUCKY, MMR)     Have you received antibody-containing blood transfusions or IVIG in the past 11 months (recommended interval is dependent on product)? (MMR, CHUCKY)     Have you taken antiviral drugs (acyclovir, famciclovir, valacyclovir for CHUCKY) in the last 24 or 48 hours,  "respectively?      Are you pregnant or planning to become pregnant within 1 month? (CHUCKY, MMR, HPV, IPV, MenB, Abrexvy; For Hep B- refer to Engerix-B; For RSV - Abrysvo is indicated for 32-36 weeks of pregnancy from September to January)     For infants, have you ever been told your child has had intussusception or a medical emergency involving obstruction of the intestine (Rotavirus)? If not for an infant, can skip this question.         *Ordering Physicians/APC should be consulted if \"yes\" is checked by the patient or guardian above.  I have received, read, and understand the Vaccine Information Statement (VIS) for each vaccine ordered.  I have considered my or my child's health status as well as the health status of my close contacts.  I have taken the opportunity to discuss my vaccine questions with my or my child's health care provider.   I have requested that the ordered vaccine(s) be given to me or my child.  I understand the benefits and risks of the vaccines.  I understand that I should remain in the clinic for 15 minutes after receiving the vaccine(s).  _________________________________________________________  Signature of Patient or Parent/Legal Guardian ____________________  Date     "

## 2024-01-05 ENCOUNTER — TELEPHONE (OUTPATIENT)
Dept: FAMILY MEDICINE CLINIC | Facility: CLINIC | Age: 64
End: 2024-01-05
Payer: MEDICARE

## 2024-01-05 ENCOUNTER — TELEPHONE (OUTPATIENT)
Dept: INTERNAL MEDICINE | Facility: CLINIC | Age: 64
End: 2024-01-05
Payer: MEDICARE

## 2024-01-05 ENCOUNTER — PATIENT ROUNDING (BHMG ONLY) (OUTPATIENT)
Dept: INTERNAL MEDICINE | Facility: CLINIC | Age: 64
End: 2024-01-05
Payer: MEDICARE

## 2024-01-05 LAB
HCV AB SERPL QL IA: NORMAL
HCV IGG SERPL QL IA: NON REACTIVE

## 2024-01-05 RX ORDER — NICOTINE 21 MG/24HR
1 PATCH, TRANSDERMAL 24 HOURS TRANSDERMAL EVERY 24 HOURS
Qty: 28 EACH | Refills: 1 | Status: SHIPPED | OUTPATIENT
Start: 2024-01-05

## 2024-01-05 NOTE — TELEPHONE ENCOUNTER
Fax received from insurance stating that the following medication has a quant limit of 30 tabs per 30 days.     losartan-hydrochlorothiazide (HYZAAR) 50-12.5 MG per tablet (01/03/2024)     Indexed via onbase

## 2024-01-05 NOTE — PROGRESS NOTES
".\"A netTALK message has been sent to the patient for PATIENT ROUNDING with [facility]\"  "

## 2024-01-05 NOTE — TELEPHONE ENCOUNTER
----- Message from BROOKLYN Guillaume sent at 1/3/2024 12:22 PM EST -----  Dr. Rutledge records.   Including imaging, office notes, and diagnosis    Additional Area 1 Location: [facility]

## 2024-01-05 NOTE — TELEPHONE ENCOUNTER
----- Message from Tiffanie Hung sent at 1/4/2024  8:41 AM EST -----  May we cancel this order, patient is no longer our patient.  ----- Message -----  From: SYSTEM  Sent: 1/4/2024   1:47 AM EST  To: [facility]

## 2024-01-05 NOTE — TELEPHONE ENCOUNTER
It appears that the patient does have this scheduled for this month, I reviewed her new PCPs most recent office note shows that they are waiting for the results of this, if it is possible to change the ordering provider and keep her current date and time and her new PCP will sign off on that, lets get the ordering provider switch to her new PCP so she will get the results, however if that is going to delay the patient in getting this done, we will keep myself as the ordering provider and I will forward the results to her new PCP once completed if we have to.  This will need to be discussed first with her PCP, then with referral/scheduling

## 2024-01-08 NOTE — TELEPHONE ENCOUNTER
Will keep original order as it since it is scheduled for 01/16/24.   Pts new PCP will be going on maternity leave soon and can forward the results to her if still in office or to who is covering.

## 2024-01-16 ENCOUNTER — HOSPITAL ENCOUNTER (OUTPATIENT)
Dept: CT IMAGING | Facility: HOSPITAL | Age: 64
Discharge: HOME | End: 2024-01-16
Payer: MEDICARE

## 2024-01-16 ENCOUNTER — HOSPITAL ENCOUNTER (OUTPATIENT)
Dept: RESPIRATORY THERAPY | Facility: HOSPITAL | Age: 64
Discharge: HOME | End: 2024-01-16
Payer: MEDICARE

## 2024-01-16 DIAGNOSIS — R07.81 RIB PAIN ON LEFT SIDE: ICD-10-CM

## 2024-01-16 DIAGNOSIS — J43.9 PULMONARY EMPHYSEMA, UNSPECIFIED EMPHYSEMA TYPE: ICD-10-CM

## 2024-01-16 DIAGNOSIS — R22.2 SOFT TISSUE SWELLING OF CHEST WALL: ICD-10-CM

## 2024-01-16 DIAGNOSIS — R07.89 CHEST WALL PAIN: ICD-10-CM

## 2024-01-16 PROCEDURE — 94729 DIFFUSING CAPACITY: CPT

## 2024-01-16 PROCEDURE — 71260 CT THORAX DX C+: CPT

## 2024-01-16 PROCEDURE — 94060 EVALUATION OF WHEEZING: CPT

## 2024-01-16 PROCEDURE — 94726 PLETHYSMOGRAPHY LUNG VOLUMES: CPT

## 2024-01-16 PROCEDURE — 25510000001 IOPAMIDOL 61 % SOLUTION: Performed by: NURSE PRACTITIONER

## 2024-01-16 RX ORDER — ALBUTEROL SULFATE 2.5 MG/3ML
2.5 SOLUTION RESPIRATORY (INHALATION) ONCE
Status: COMPLETED | OUTPATIENT
Start: 2024-01-16 | End: 2024-01-16

## 2024-01-16 RX ADMIN — ALBUTEROL SULFATE 2.5 MG: 2.5 SOLUTION RESPIRATORY (INHALATION) at 13:04

## 2024-01-16 RX ADMIN — IOPAMIDOL 100 ML: 612 INJECTION, SOLUTION INTRAVENOUS at 14:08

## 2024-01-23 ENCOUNTER — TELEPHONE (OUTPATIENT)
Dept: INTERNAL MEDICINE | Facility: CLINIC | Age: 64
End: 2024-01-23

## 2024-01-24 ENCOUNTER — TELEPHONE (OUTPATIENT)
Dept: INTERNAL MEDICINE | Facility: CLINIC | Age: 64
End: 2024-01-24
Payer: MEDICARE

## 2024-01-24 NOTE — TELEPHONE ENCOUNTER
Spoke with patient and informed of stool collection instructions. Patient is going to stop by office to get the kit.

## 2024-01-24 NOTE — TELEPHONE ENCOUNTER
"    Caller: Cindy Bella \"Bridgette\"    Relationship to patient: Self    Best call back number: [phone]    Patient is needing: PATIENT CALLED STATING BROOKLYN ARANGO HAD ORDERED FOR HER TO HAVE A TEST TO HAVE HER STOOL TESTED DUE TO SOME SYMPTOMS SHE HAD BEEN HAVING BUT SHE NEVER RECEIVED THE TEST OR ANYTHING TO COLLECT HER STOOL SAMPLE. PATIENT IS NEEDING TO KNOW WAS SHE SUPPOSED TO GET SOMETHING AT THE OFFICE TO COMPLETE THESE ORDERS OR DOES SHE NEED TO GO INTO ONE OF THE LABS. PATIENT IS REQUESTING A CALL BACK AS SOON AS POSSIBLE SO SHE CAN HAVE THIS DONE BEFORE HER UPCOMING FOLLOW UP ON 1.29.24      "

## 2024-01-30 RX ORDER — METHOCARBAMOL 750 MG/1
750 TABLET, FILM COATED ORAL 3 TIMES DAILY
Qty: 90 TABLET | Refills: 1 | Status: SHIPPED | OUTPATIENT
Start: 2024-01-30

## 2024-02-01 ENCOUNTER — TELEPHONE (OUTPATIENT)
Dept: INTERNAL MEDICINE | Facility: CLINIC | Age: 64
End: 2024-02-01
Payer: MEDICARE

## 2024-02-01 DIAGNOSIS — F17.210 CIGARETTE NICOTINE DEPENDENCE WITHOUT COMPLICATION: ICD-10-CM

## 2024-02-01 RX ORDER — BUDESONIDE, GLYCOPYRROLATE, AND FORMOTEROL FUMARATE 160; 9; 4.8 UG/1; UG/1; UG/1
2 AEROSOL, METERED RESPIRATORY (INHALATION) 2 TIMES DAILY
Qty: 10.7 G | Refills: 0 | Status: SHIPPED | OUTPATIENT
Start: 2024-02-01 | End: 2024-02-05

## 2024-02-05 DIAGNOSIS — I10 ESSENTIAL HYPERTENSION: ICD-10-CM

## 2024-02-05 DIAGNOSIS — F41.9 ANXIETY: ICD-10-CM

## 2024-02-05 DIAGNOSIS — F17.210 CIGARETTE NICOTINE DEPENDENCE WITHOUT COMPLICATION: ICD-10-CM

## 2024-02-05 DIAGNOSIS — K21.9 GASTROESOPHAGEAL REFLUX DISEASE, UNSPECIFIED WHETHER ESOPHAGITIS PRESENT: ICD-10-CM

## 2024-02-05 DIAGNOSIS — E78.2 MIXED HYPERLIPIDEMIA: ICD-10-CM

## 2024-02-05 RX ORDER — BUSPIRONE HYDROCHLORIDE 10 MG/1
10 TABLET ORAL 2 TIMES DAILY
Qty: 180 TABLET | Refills: 0 | Status: SHIPPED | OUTPATIENT
Start: 2024-02-05

## 2024-02-05 RX ORDER — BUDESONIDE, GLYCOPYRROLATE, AND FORMOTEROL FUMARATE 160; 9; 4.8 UG/1; UG/1; UG/1
2 AEROSOL, METERED RESPIRATORY (INHALATION) 2 TIMES DAILY
Qty: 10.7 G | Refills: 0 | Status: SHIPPED | OUTPATIENT
Start: 2024-02-05

## 2024-02-05 RX ORDER — ATORVASTATIN CALCIUM 10 MG/1
TABLET, FILM COATED ORAL
Qty: 90 TABLET | Refills: 0 | Status: SHIPPED | OUTPATIENT
Start: 2024-02-05

## 2024-02-05 RX ORDER — ONDANSETRON 4 MG/1
TABLET, ORALLY DISINTEGRATING ORAL
Qty: 90 TABLET | Refills: 1 | Status: SHIPPED | OUTPATIENT
Start: 2024-02-05

## 2024-02-05 RX ORDER — PROPRANOLOL HYDROCHLORIDE 80 MG/1
TABLET ORAL
Qty: 180 TABLET | Refills: 0 | Status: SHIPPED | OUTPATIENT
Start: 2024-02-05

## 2024-02-07 DIAGNOSIS — F41.9 ANXIETY: ICD-10-CM

## 2024-02-07 DIAGNOSIS — I10 ESSENTIAL HYPERTENSION: ICD-10-CM

## 2024-02-07 RX ORDER — CITALOPRAM 40 MG/1
40 TABLET ORAL DAILY
Qty: 90 TABLET | Refills: 11 | Status: SHIPPED | OUTPATIENT
Start: 2024-02-07

## 2024-02-07 RX ORDER — AMLODIPINE BESYLATE 2.5 MG/1
2.5 TABLET ORAL DAILY
Qty: 90 TABLET | Refills: 11 | Status: SHIPPED | OUTPATIENT
Start: 2024-02-07

## 2024-03-08 DIAGNOSIS — F17.210 CIGARETTE NICOTINE DEPENDENCE WITHOUT COMPLICATION: ICD-10-CM

## 2024-03-11 RX ORDER — METHOCARBAMOL 750 MG/1
750 TABLET, FILM COATED ORAL 3 TIMES DAILY
Qty: 90 TABLET | Refills: 1 | Status: SHIPPED | OUTPATIENT
Start: 2024-03-11

## 2024-03-11 RX ORDER — BUDESONIDE, GLYCOPYRROLATE, AND FORMOTEROL FUMARATE 160; 9; 4.8 UG/1; UG/1; UG/1
2 AEROSOL, METERED RESPIRATORY (INHALATION) 2 TIMES DAILY
Qty: 10.7 G | Refills: 11 | Status: SHIPPED | OUTPATIENT
Start: 2024-03-11

## 2024-03-12 ENCOUNTER — TELEPHONE (OUTPATIENT)
Dept: GASTROENTEROLOGY | Facility: CLINIC | Age: 64
End: 2024-03-12
Payer: MEDICARE

## 2024-03-12 NOTE — TELEPHONE ENCOUNTER
TRIED CALLING PATIENT FROM THE CANCELLATION LIST TO SEE IF SHE WOULD LIKE A SOONER APPOINTMENT WITH CALIN MCCARTHY. SHE HAS OPENINGS ON 3/12 AND 3  /14.

## 2024-04-19 NOTE — PROGRESS NOTES
Chief Complaint  Hypertension (Follow-up) and Skin Problem (Patient is requesting Retin-a cream)    Subjective          Cindy Bella presents to [facility]  History of Present Illness    Patient would like a tens unit.    Hyperlipidemia  This is a chronic problem. The problem is controlled. She has no history of chronic renal disease, diabetes, hypothyroidism, liver disease, obesity or nephrotic syndrome. Pertinent negatives include no chest pain, focal sensory loss, focal weakness, leg pain, myalgias or shortness of breath. Compliance problems include adherence to diet and adherence to exercise.    Hypertension  This is a chronic problem. Associated symptoms include anxiety. Pertinent negatives include no blurred vision, chest pain, headaches, malaise/fatigue, neck pain, orthopnea, palpitations, peripheral edema, PND, shortness of breath or sweats. Compliance problems include diet and exercise.  There is no history of angina, kidney disease or CAD/MI. There is no history of chronic renal disease.     Has not been taking blood pressure at home.     Anxiety  Symptoms include irritability. Patient reports no chest pain, decreased concentration, insomnia, palpitations or shortness of breath.     Her past medical history is significant for depression.   DepressionPatient presents with the following symptoms: irritability.  Patient is not experiencing: decreased concentration, insomnia, palpitations, shortness of breath and weight loss.      Diarrhea   This is a recurrent problem. The stool consistency is described as Watery. Pertinent negatives include no abdominal pain, arthralgias, bloating, chills, coughing, fever, headaches, increased  flatus, myalgias, sweats, URI, vomiting or weight loss. Treatments tried: dicyclomine. caused constipation - has not been taking it.   COPD  There is no chest tightness, cough, difficulty breathing, frequent throat clearing,  hemoptysis or shortness of breath. Pertinent negatives include no chest pain, fever, headaches, malaise/fatigue, myalgias, PND, sore throat, sweats or weight loss.   Heartburn  She reports no abdominal pain, no chest pain, no coughing or no sore throat. Pertinent negatives include no fatigue or weight loss.   Nicotine Dependence  Presents for initial visit. Symptoms include cravings and irritability. Symptoms are negative for decreased concentration, fatigue, headache, insomnia and sore throat. Preferred tobacco types include cigarettes. Preferred cigarette types include filtered. Preferred strength is light. Preferred cigarettes are non-menthol. Preferred brands include [place]. Number of cigarettes per day: between 1/2-3/4 pack per day. Age when first smoked: 20. Treatments tried: quit during pregnancies. Cindy is ready to quit.                                                                                                                                                                                                                        [id number] x 2 years. Has lost 40 lbs since this first started.   No blood in stools. Has never had cscope.   Has appt in June [facility].   Has not collected stool studies yet       Muscle spasms   Current Outpatient Medications   Medication Instructions    albuterol (ACCUNEB) 0.63 mg, Nebulization, Every 6 Hours PRN    albuterol sulfate  (90 Base) MCG/ACT inhaler INHALE TWO PUFFS BY MOUTH EVERY 4 HOURS AS NEEDED FOR WHEEZING (BULK)    amLODIPine (NORVASC) 2.5 mg, Oral, Daily    Aspirin Low Dose 81 MG EC tablet TAKE ONE TABLET BY MOUTH DAILY AT 9 AM (VIAL)    atorvastatin (LIPITOR) 10 MG tablet TAKE ONE TABLET BY MOUTH DAILY AT 9 PM EVERY NIGHT    Breztri Aerosphere 160-9-4.8 MCG/ACT aerosol inhaler 2 puffs, Inhalation, 2 Times Daily    busPIRone (BUSPAR) 15 mg, Oral, 3 Times Daily    busPIRone (BUSPAR) 10 mg, Oral, 2 Times Daily    citalopram (CELEXA) 40 mg, Oral, Daily     "hydroCHLOROthiazide 25 mg, Oral, Daily    ipratropium-albuterol (DUO-NEB) 0.5-2.5 mg/3 ml nebulizer 3 mL, Nebulization, 3 Times Daily PRN    losartan (COZAAR) 100 mg, Oral, Daily    methocarbamol (ROBAXIN) 750 mg, Oral, 3 Times Daily    nicotine (NICODERM CQ) 21 MG/24HR patch 1 patch, Transdermal, Every 24 Hours    nicotine polacrilex (NICORETTE) 2 mg, Mouth/Throat, As Needed    omeprazole (PRILOSEC) 20 mg, Oral, 2 Times Daily    propranolol (INDERAL) 80 MG tablet TAKE ONE TABLET BY MOUTH TWICE DAILY @9AM & 5PM    traZODone (DESYREL) 100 mg, Oral, Nightly    tretinoin (Retin-A) 0.01 % gel Topical, Nightly       The following portions of the patient's history were reviewed and updated as appropriate: allergies, current medications, past family history, past medical history, past social history, past surgical history, and problem list.    Objective   Vital Signs:   /88 (BP Location: Right arm, Patient Position: Sitting, Cuff Size: Adult)   Pulse 62   Temp 96.9 °F (36.1 °C) (Temporal)   Ht 160 cm (63\")   Wt 45.5 kg (100 lb 4 oz)   SpO2 96%   BMI 17.76 kg/m²     BP Readings from Last 3 Encounters:   04/23/24 146/88   01/03/24 90/59   10/30/23 120/59     Wt Readings from Last 3 Encounters:   04/23/24 45.5 kg (100 lb 4 oz)   01/03/24 43.1 kg (95 lb)   10/30/23 43.8 kg (96 lb 9.6 oz)     BMI is below normal parameters (malnutrition). Recommendations: Information on healthy weight added to patient's after visit summary     Physical Exam  Pulmonary:      Breath sounds: Decreased breath sounds present.          Appearance: No acute distress, well-nourished  Head: normocephalic, atraumatic  Eyes: extraocular movements intact, no scleral icterus, no conjunctival injection  Ears, Nose, and Throat: external ears normal, nares patent, moist mucous membranes  Cardiovascular: regular rate and rhythm. no murmurs, rubs, or gallops. no edema  Respiratory: breathing comfortably, symmetric chest rise, clear to auscultation " bilaterally. No wheezes, rales, or rhonchi.  Neuro: alert and oriented to time, place, and person. Normal gait  Psych: normal mood and affect     Result Review :   The following data was reviewed by: BROOKLYN Guillaume on 04/23/2024:  Common labs          6/5/2023    12:02 10/30/2023    09:43 1/3/2024    12:54   Common Labs   Glucose  94  85    BUN  10  10    Creatinine  0.80  0.60    Sodium  141  139    Potassium  3.4  3.8    Chloride  102  100    Calcium 10.7  10.5  10.4    Albumin  4.3  4.5    Total Bilirubin  0.5  0.4    Alkaline Phosphatase  70  70    AST (SGOT)  17  20    ALT (SGPT)  11  14    WBC 11.19  8.61  9.24    Hemoglobin 14.0  14.3  14.0    Hematocrit 41.4  42.9  42.6    Platelets 338  288  310    Total Cholesterol  144  153    Triglycerides  143  103    HDL Cholesterol  49  57    LDL Cholesterol   70  77             Lab Results   Component Value Date    SARSANTIGEN Not Detected 01/24/2023    COVID19 NOT DETECTED 10/30/2020    FLUAAG Not Detected 01/24/2023    FLUBAG Not Detected 01/24/2023       Procedures        Assessment and Plan    Diagnoses and all orders for this visit:    1. Mixed hyperlipidemia (Primary)  Assessment & Plan:   Lipid abnormalities are  will recheck lipids today    Plan:  Continue same medication/s without change.      Discussed medication dosage, use, side effects, and goals of treatment in detail.    Counseled patient on lifestyle modifications to help control hyperlipidemia.   Cholesterol lowering dietary information shared with patient.    Patient Treatment Goals:   LDL goal is less than 70    Followup in 3 months.    Orders:  -     Lipid Panel  -     Comprehensive Metabolic Panel  -     CBC & Differential    2. Essential hypertension  Assessment & Plan:  Hypertension is uncontrolled  Continue current treatment regimen.  Stop smoking.  Ambulatory blood pressure monitoring.  Blood pressure will be reassessedin 4 weeks.    - patient having episodes of dizziness. Keep log  x2 weeks and bring to clinic or send via [employer]     Orders:  -     Comprehensive Metabolic Panel  -     CBC & Differential    3. Gastroesophageal reflux disease, unspecified whether esophagitis present  Overview:  Stable on Prilosec, continue current dose    Orders:  -     Comprehensive Metabolic Panel  -     CBC & Differential    4. CHRISS (generalized anxiety disorder)  Assessment & Plan:  Psychological condition is improving with treatment.  Continue current treatment regimen.  Regular aerobic exercise.  Psychological condition  will be reassessed in 3 months.    Orders:  -     Comprehensive Metabolic Panel  -     CBC & Differential    5. Chronic low back pain without sciatica, unspecified back pain laterality  -     Miscellaneous DME    6. Pulmonary emphysema, unspecified emphysema type  Assessment & Plan:  COPD is improving with treatment.    Plan:  Continue same medication/s without change.    Discussed medication dosage, use, side effects, and goals of treatment in detail.    Discussed monitoring symptoms and use of quick-relief medications and maintenance medication..    Patient Treatment Goals:   symptom prevention, minimizing limitation in activity, prevention of exacerbations and use of ER/inpatient care, maintenance of optimal pulmonary function, and minimization of adverse effects of treatment    Followup in 3 months.    Pulm referral placed.       Orders:  -     Ambulatory Referral to Pulmonology    7. Insomnia, unspecified type  -     Comprehensive Metabolic Panel  -     CBC & Differential    8. Cigarette nicotine dependence with other nicotine-induced disorder  Assessment & Plan:  Tobacco use is unchanged.  Smoking cessation counseling was provided.  Tobacco use will be reassessed in 6 months.    - could not afford cessation products/                                          Orders:  -     Comprehensive Metabolic Panel  -     CBC & Differential  -     Ambulatory Referral to Pulmonology    9. Diarrhea,  unspecified type  -     Clostridioides difficile Toxin, PCR - Stool, Per Rectum; Future    10. Skin lesion  -     Ambulatory Referral to Dermatology    11. Underweight    12. Decreased appetite    13. Muscle spasm  Assessment & Plan:  Continue the Robaxin. Will reassess at next appt.       14. Rosacea  -     tretinoin (Retin-A) 0.01 % gel; Apply  topically to the appropriate area as directed Every Night.  Dispense: 45 g; Refill: 0          Medications Discontinued During This Encounter   Medication Reason    ondansetron ODT (ZOFRAN-ODT) 4 MG disintegrating tablet Historical Med - Therapy completed    dicyclomine (BENTYL) 10 MG capsule Historical Med - Therapy completed          Follow Up   Return in about 4 weeks (around 5/21/2024) for Hypertension.  Patient was given instructions and counseling regarding her condition or for health maintenance advice. Please see specific information pulled into the AVS if appropriate.       Alivia Burgess, BROOKLYN  04/23/24  09:45 EDT

## 2024-04-23 ENCOUNTER — OFFICE VISIT (OUTPATIENT)
Dept: INTERNAL MEDICINE | Facility: CLINIC | Age: 64
End: 2024-04-23
Payer: MEDICARE

## 2024-04-23 VITALS
HEIGHT: 63 IN | OXYGEN SATURATION: 96 % | WEIGHT: 100.25 LBS | TEMPERATURE: 96.9 F | BODY MASS INDEX: 17.76 KG/M2 | HEART RATE: 62 BPM | DIASTOLIC BLOOD PRESSURE: 88 MMHG | SYSTOLIC BLOOD PRESSURE: 146 MMHG

## 2024-04-23 DIAGNOSIS — E78.2 MIXED HYPERLIPIDEMIA: Primary | ICD-10-CM

## 2024-04-23 DIAGNOSIS — G89.29 CHRONIC LOW BACK PAIN WITHOUT SCIATICA, UNSPECIFIED BACK PAIN LATERALITY: ICD-10-CM

## 2024-04-23 DIAGNOSIS — F17.218 CIGARETTE NICOTINE DEPENDENCE WITH OTHER NICOTINE-INDUCED DISORDER: ICD-10-CM

## 2024-04-23 DIAGNOSIS — G47.00 INSOMNIA, UNSPECIFIED TYPE: ICD-10-CM

## 2024-04-23 DIAGNOSIS — R19.7 DIARRHEA, UNSPECIFIED TYPE: ICD-10-CM

## 2024-04-23 DIAGNOSIS — R63.6 UNDERWEIGHT: ICD-10-CM

## 2024-04-23 DIAGNOSIS — M62.838 MUSCLE SPASM: ICD-10-CM

## 2024-04-23 DIAGNOSIS — R63.0 DECREASED APPETITE: ICD-10-CM

## 2024-04-23 DIAGNOSIS — I10 ESSENTIAL HYPERTENSION: ICD-10-CM

## 2024-04-23 DIAGNOSIS — F41.1 GAD (GENERALIZED ANXIETY DISORDER): ICD-10-CM

## 2024-04-23 DIAGNOSIS — J43.9 PULMONARY EMPHYSEMA, UNSPECIFIED EMPHYSEMA TYPE: ICD-10-CM

## 2024-04-23 DIAGNOSIS — L71.9 ROSACEA: ICD-10-CM

## 2024-04-23 DIAGNOSIS — M54.50 CHRONIC LOW BACK PAIN WITHOUT SCIATICA, UNSPECIFIED BACK PAIN LATERALITY: ICD-10-CM

## 2024-04-23 DIAGNOSIS — L98.9 SKIN LESION: ICD-10-CM

## 2024-04-23 DIAGNOSIS — K21.9 GASTROESOPHAGEAL REFLUX DISEASE, UNSPECIFIED WHETHER ESOPHAGITIS PRESENT: ICD-10-CM

## 2024-04-23 PROBLEM — C71.9 MALIGNANT NEOPLASM OF BRAIN: Status: RESOLVED | Noted: 2021-11-11 | Resolved: 2024-04-23

## 2024-04-23 LAB
ALBUMIN SERPL-MCNC: 4.2 G/DL (ref 3.5–5.2)
ALBUMIN/GLOB SERPL: 1.4 G/DL
ALP SERPL-CCNC: 75 U/L (ref 39–117)
ALT SERPL W P-5'-P-CCNC: 16 U/L (ref 1–33)
ANION GAP SERPL CALCULATED.3IONS-SCNC: 11.2 MMOL/L (ref 5–15)
AST SERPL-CCNC: 23 U/L (ref 1–32)
BASOPHILS # BLD AUTO: 0.05 10*3/MM3 (ref 0–0.2)
BASOPHILS NFR BLD AUTO: 0.5 % (ref 0–1.5)
BILIRUB SERPL-MCNC: 0.6 MG/DL (ref 0–1.2)
BUN SERPL-MCNC: 9 MG/DL (ref 8–23)
BUN/CREAT SERPL: 12.2 (ref 7–25)
CALCIUM SPEC-SCNC: 10.6 MG/DL (ref 8.6–10.5)
CHLORIDE SERPL-SCNC: 103 MMOL/L (ref 98–107)
CHOLEST SERPL-MCNC: 170 MG/DL (ref 0–200)
CO2 SERPL-SCNC: 25.8 MMOL/L (ref 22–29)
CREAT SERPL-MCNC: 0.74 MG/DL (ref 0.57–1)
DEPRECATED RDW RBC AUTO: 43.3 FL (ref 37–54)
EGFRCR SERPLBLD CKD-EPI 2021: 90.5 ML/MIN/1.73
EOSINOPHIL # BLD AUTO: 0.11 10*3/MM3 (ref 0–0.4)
EOSINOPHIL NFR BLD AUTO: 1.2 % (ref 0.3–6.2)
ERYTHROCYTE [DISTWIDTH] IN BLOOD BY AUTOMATED COUNT: 12.5 % (ref 12.3–15.4)
GLOBULIN UR ELPH-MCNC: 2.9 GM/DL
GLUCOSE SERPL-MCNC: 85 MG/DL (ref 65–99)
HCT VFR BLD AUTO: 44.5 % (ref 34–46.6)
HDLC SERPL-MCNC: 49 MG/DL (ref 40–60)
HGB BLD-MCNC: 15 G/DL (ref 12–15.9)
IMM GRANULOCYTES # BLD AUTO: 0.04 10*3/MM3 (ref 0–0.05)
IMM GRANULOCYTES NFR BLD AUTO: 0.4 % (ref 0–0.5)
LDLC SERPL CALC-MCNC: 97 MG/DL (ref 0–100)
LDLC/HDLC SERPL: 1.91 {RATIO}
LYMPHOCYTES # BLD AUTO: 2.9 10*3/MM3 (ref 0.7–3.1)
LYMPHOCYTES NFR BLD AUTO: 30.9 % (ref 19.6–45.3)
MCH RBC QN AUTO: 32 PG (ref 26.6–33)
MCHC RBC AUTO-ENTMCNC: 33.7 G/DL (ref 31.5–35.7)
MCV RBC AUTO: 94.9 FL (ref 79–97)
MONOCYTES # BLD AUTO: 0.45 10*3/MM3 (ref 0.1–0.9)
MONOCYTES NFR BLD AUTO: 4.8 % (ref 5–12)
NEUTROPHILS NFR BLD AUTO: 5.85 10*3/MM3 (ref 1.7–7)
NEUTROPHILS NFR BLD AUTO: 62.2 % (ref 42.7–76)
NRBC BLD AUTO-RTO: 0 /100 WBC (ref 0–0.2)
PLATELET # BLD AUTO: 269 10*3/MM3 (ref 140–450)
PMV BLD AUTO: 10.5 FL (ref 6–12)
POTASSIUM SERPL-SCNC: 4.2 MMOL/L (ref 3.5–5.2)
PROT SERPL-MCNC: 7.1 G/DL (ref 6–8.5)
RBC # BLD AUTO: 4.69 10*6/MM3 (ref 3.77–5.28)
SODIUM SERPL-SCNC: 140 MMOL/L (ref 136–145)
TRIGL SERPL-MCNC: 138 MG/DL (ref 0–150)
VLDLC SERPL-MCNC: 24 MG/DL (ref 5–40)
WBC NRBC COR # BLD AUTO: 9.4 10*3/MM3 (ref 3.4–10.8)

## 2024-04-23 PROCEDURE — 3077F SYST BP >= 140 MM HG: CPT | Performed by: NURSE PRACTITIONER

## 2024-04-23 PROCEDURE — 80053 COMPREHEN METABOLIC PANEL: CPT | Performed by: NURSE PRACTITIONER

## 2024-04-23 PROCEDURE — 1159F MED LIST DOCD IN RCRD: CPT | Performed by: NURSE PRACTITIONER

## 2024-04-23 PROCEDURE — 99214 OFFICE O/P EST MOD 30 MIN: CPT | Performed by: NURSE PRACTITIONER

## 2024-04-23 PROCEDURE — 3079F DIAST BP 80-89 MM HG: CPT | Performed by: NURSE PRACTITIONER

## 2024-04-23 PROCEDURE — 80061 LIPID PANEL: CPT | Performed by: NURSE PRACTITIONER

## 2024-04-23 PROCEDURE — 1160F RVW MEDS BY RX/DR IN RCRD: CPT | Performed by: NURSE PRACTITIONER

## 2024-04-23 PROCEDURE — 85025 COMPLETE CBC W/AUTO DIFF WBC: CPT | Performed by: NURSE PRACTITIONER

## 2024-04-23 RX ORDER — TRETINOIN 0.1 MG/G
GEL TOPICAL NIGHTLY
Qty: 45 G | Refills: 0 | Status: SHIPPED | OUTPATIENT
Start: 2024-04-23

## 2024-04-23 NOTE — ASSESSMENT & PLAN NOTE
Patient's depression is a recurrent episode that is mild without psychosis. Depression is in partial remission and stable.    Plan:   Continue current medication therapy     Followup in 3 months.

## 2024-04-23 NOTE — ASSESSMENT & PLAN NOTE
Tobacco use is unchanged.  Smoking cessation counseling was provided.  Tobacco use will be reassessed in 6 months.    - could not afford cessation products/

## 2024-04-23 NOTE — ASSESSMENT & PLAN NOTE
COPD is improving with treatment.    Plan:  Continue same medication/s without change.    Discussed medication dosage, use, side effects, and goals of treatment in detail.    Discussed monitoring symptoms and use of quick-relief medications and maintenance medication..    Patient Treatment Goals:   symptom prevention, minimizing limitation in activity, prevention of exacerbations and use of ER/inpatient care, maintenance of optimal pulmonary function, and minimization of adverse effects of treatment    Followup in 3 months.    Pulm referral placed.

## 2024-04-23 NOTE — ASSESSMENT & PLAN NOTE
Lipid abnormalities are  will recheck lipids today    Plan:  Continue same medication/s without change.      Discussed medication dosage, use, side effects, and goals of treatment in detail.    Counseled patient on lifestyle modifications to help control hyperlipidemia.   Cholesterol lowering dietary information shared with patient.    Patient Treatment Goals:   LDL goal is less than 70    Followup in 3 months.

## 2024-04-23 NOTE — ASSESSMENT & PLAN NOTE
Hypertension is uncontrolled  Continue current treatment regimen.  Stop smoking.  Ambulatory blood pressure monitoring.  Blood pressure will be reassessedin 4 weeks.    - patient having episodes of dizziness. Keep log x2 weeks and bring to clinic or send via [employer]

## 2024-04-24 ENCOUNTER — TELEPHONE (OUTPATIENT)
Dept: INTERNAL MEDICINE | Facility: CLINIC | Age: 64
End: 2024-04-24
Payer: MEDICARE

## 2024-04-24 NOTE — TELEPHONE ENCOUNTER
----- Message from BROOKLYN Guillaume sent at 4/24/2024  8:36 AM EDT -----  Patient has 24-hour calcium urine ordered. Needs to get jug from lab.     LDL went up would like to see less than 70. Watch saturated fat and cholesterol intake.

## 2024-04-29 DIAGNOSIS — K21.9 GASTROESOPHAGEAL REFLUX DISEASE, UNSPECIFIED WHETHER ESOPHAGITIS PRESENT: ICD-10-CM

## 2024-04-30 ENCOUNTER — TELEPHONE (OUTPATIENT)
Dept: INTERNAL MEDICINE | Facility: CLINIC | Age: 64
End: 2024-04-30
Payer: MEDICARE

## 2024-04-30 RX ORDER — ONDANSETRON 4 MG/1
TABLET, ORALLY DISINTEGRATING ORAL
Qty: 30 TABLET | Refills: 1 | Status: SHIPPED | OUTPATIENT
Start: 2024-04-30

## 2024-04-30 NOTE — TELEPHONE ENCOUNTER
"Caller: Cindy Bella \"Bridgette\"    Relationship: Self    Best call back number: [phone]     What medication are you requesting: APPETITE STIMULANT    If a prescription is needed, what is your preferred pharmacy and phone number: [facility] [place] - [place], [place] - [address] - [phone] [facility] [phone] FX     Additional notes: PER PATIENT, THIS MEDICATION WAS DISCUSSED WHEN SHE SAW PROVIDER LAST. PATIENT IS ALSO MAKING SURE THAT THE RETIN A CREAM IS SENT IN AND NOT A GEL.           "

## 2024-05-01 RX ORDER — MEGESTROL ACETATE 20 MG/1
20 TABLET ORAL DAILY
Qty: 30 TABLET | Refills: 1 | Status: SHIPPED | OUTPATIENT
Start: 2024-05-01

## 2024-05-08 ENCOUNTER — TELEPHONE (OUTPATIENT)
Dept: INTERNAL MEDICINE | Facility: CLINIC | Age: 64
End: 2024-05-08
Payer: MEDICARE

## 2024-05-08 DIAGNOSIS — K21.9 GASTROESOPHAGEAL REFLUX DISEASE, UNSPECIFIED WHETHER ESOPHAGITIS PRESENT: ICD-10-CM

## 2024-05-08 RX ORDER — OMEPRAZOLE 20 MG/1
20 CAPSULE, DELAYED RELEASE ORAL 2 TIMES DAILY
Qty: 180 CAPSULE | Refills: 1 | Status: SHIPPED | OUTPATIENT
Start: 2024-05-08

## 2024-05-09 ENCOUNTER — PRIOR AUTHORIZATION (OUTPATIENT)
Dept: INTERNAL MEDICINE | Facility: CLINIC | Age: 64
End: 2024-05-09
Payer: MEDICARE

## 2024-05-09 DIAGNOSIS — G47.00 INSOMNIA, UNSPECIFIED TYPE: ICD-10-CM

## 2024-05-09 DIAGNOSIS — F41.9 ANXIETY: ICD-10-CM

## 2024-05-09 DIAGNOSIS — I10 ESSENTIAL HYPERTENSION: ICD-10-CM

## 2024-05-09 RX ORDER — BUSPIRONE HYDROCHLORIDE 10 MG/1
10 TABLET ORAL 2 TIMES DAILY
Qty: 180 TABLET | Refills: 11 | Status: SHIPPED | OUTPATIENT
Start: 2024-05-09

## 2024-05-09 RX ORDER — TRAZODONE HYDROCHLORIDE 100 MG/1
100 TABLET ORAL
Qty: 90 TABLET | Refills: 11 | Status: SHIPPED | OUTPATIENT
Start: 2024-05-09

## 2024-05-09 RX ORDER — METHOCARBAMOL 750 MG/1
750 TABLET, FILM COATED ORAL 3 TIMES DAILY
Qty: 90 TABLET | Refills: 11 | Status: SHIPPED | OUTPATIENT
Start: 2024-05-09

## 2024-05-09 RX ORDER — PROPRANOLOL HYDROCHLORIDE 80 MG/1
TABLET ORAL
Qty: 180 TABLET | Refills: 11 | Status: SHIPPED | OUTPATIENT
Start: 2024-05-09

## 2024-05-24 DIAGNOSIS — L71.9 ROSACEA: ICD-10-CM

## 2024-05-24 RX ORDER — TRETINOIN 0.1 MG/G
GEL TOPICAL NIGHTLY
Qty: 45 G | Refills: 2 | Status: SHIPPED | OUTPATIENT
Start: 2024-05-24

## 2024-06-03 RX ORDER — MEGESTROL ACETATE 20 MG/1
20 TABLET ORAL DAILY
Qty: 30 TABLET | Refills: 1 | Status: SHIPPED | OUTPATIENT
Start: 2024-06-03

## 2024-06-05 NOTE — TELEPHONE ENCOUNTER
Caller: [facility] [place] - [place], [place] - [address] - [phone] [facility] [phone] FX    Relationship: Pharmacy    Best call back number: [phone]     Requested Prescriptions:   Requested Prescriptions     Pending Prescriptions Disp Refills    losartan (COZAAR) 100 MG tablet 90 tablet 1     Sig: Take 1 tablet by mouth Daily.    megestrol (MEGACE) 20 MG tablet 30 tablet 1     Sig: Take 1 tablet by mouth Daily.   AS WELL AS   HCTZ 25 MG      Pharmacy where request should be sent: [facility] ([place] - [place], [place] - [address] [facility] - [phone] [facility] [phone] FX     Last office visit with prescribing clinician: 4/23/2024   Last telemedicine visit with prescribing clinician: Visit date not found   Next office visit with prescribing clinician: 6/5/2024     Additional details provided by patient: CALLER STATED THAT THE PATIENT IS REQUESTING  A 90 DAY SUPPLY AND OUT OF MEDICATION     Does the patient have less than a 3 day supply:  [x] Yes  [] No      Tiffanie Spann   06/05/24 13:10 EDT

## 2024-06-06 DIAGNOSIS — E78.2 MIXED HYPERLIPIDEMIA: ICD-10-CM

## 2024-06-06 RX ORDER — LOSARTAN POTASSIUM 100 MG/1
100 TABLET ORAL DAILY
Qty: 90 TABLET | Refills: 1 | Status: SHIPPED | OUTPATIENT
Start: 2024-06-06

## 2024-06-06 RX ORDER — MEGESTROL ACETATE 20 MG/1
20 TABLET ORAL DAILY
Qty: 30 TABLET | Refills: 1 | OUTPATIENT
Start: 2024-06-06

## 2024-06-06 RX ORDER — ATORVASTATIN CALCIUM 10 MG/1
TABLET, FILM COATED ORAL
Qty: 90 TABLET | Refills: 1 | Status: SHIPPED | OUTPATIENT
Start: 2024-06-06

## 2024-06-18 ENCOUNTER — OFFICE VISIT (OUTPATIENT)
Dept: INTERNAL MEDICINE | Facility: CLINIC | Age: 64
End: 2024-06-18
Payer: MEDICARE

## 2024-06-18 VITALS
WEIGHT: 92 LBS | HEART RATE: 52 BPM | OXYGEN SATURATION: 96 % | HEIGHT: 63 IN | SYSTOLIC BLOOD PRESSURE: 149 MMHG | BODY MASS INDEX: 16.3 KG/M2 | DIASTOLIC BLOOD PRESSURE: 76 MMHG

## 2024-06-18 DIAGNOSIS — E78.2 MIXED HYPERLIPIDEMIA: Primary | ICD-10-CM

## 2024-06-18 DIAGNOSIS — J18.9 PNEUMONIA DUE TO INFECTIOUS ORGANISM, UNSPECIFIED LATERALITY, UNSPECIFIED PART OF LUNG: ICD-10-CM

## 2024-06-18 DIAGNOSIS — I10 ESSENTIAL HYPERTENSION: ICD-10-CM

## 2024-06-18 PROCEDURE — 3077F SYST BP >= 140 MM HG: CPT | Performed by: NURSE PRACTITIONER

## 2024-06-18 PROCEDURE — 1125F AMNT PAIN NOTED PAIN PRSNT: CPT | Performed by: NURSE PRACTITIONER

## 2024-06-18 PROCEDURE — G2211 COMPLEX E/M VISIT ADD ON: HCPCS | Performed by: NURSE PRACTITIONER

## 2024-06-18 PROCEDURE — 99213 OFFICE O/P EST LOW 20 MIN: CPT | Performed by: NURSE PRACTITIONER

## 2024-06-18 PROCEDURE — 3078F DIAST BP <80 MM HG: CPT | Performed by: NURSE PRACTITIONER

## 2024-06-18 RX ORDER — METHYLPREDNISOLONE 4 MG/1
TABLET ORAL
Qty: 21 TABLET | Refills: 0 | Status: SHIPPED | OUTPATIENT
Start: 2024-06-18

## 2024-06-18 RX ORDER — AMLODIPINE BESYLATE 5 MG/1
5 TABLET ORAL DAILY
Qty: 30 TABLET | Refills: 1 | Status: SHIPPED | OUTPATIENT
Start: 2024-06-18

## 2024-06-18 RX ORDER — LEVOFLOXACIN 750 MG/1
750 TABLET, FILM COATED ORAL DAILY
Qty: 5 TABLET | Refills: 0 | Status: SHIPPED | OUTPATIENT
Start: 2024-06-18 | End: 2024-06-23

## 2024-06-18 NOTE — PROGRESS NOTES
Chief Complaint  Cough    Subjective          Cindy Bella presents to [facility]  Cough  Associated symptoms include shortness of breath. Pertinent negatives include no chest pain, chills, ear congestion, ear pain, fever, headaches, heartburn, hemoptysis, myalgias, nasal congestion, postnasal drip, rash, rhinorrhea, sore throat, sweats, weight loss or wheezing. Treatments tried: zpak. Improvement on treatment: none; worsening. Her past medical history is significant for COPD.   Hypertension  This is a chronic problem. Associated symptoms include shortness of breath. Pertinent negatives include no anxiety, blurred vision, chest pain, headaches, malaise/fatigue, neck pain, orthopnea, palpitations, peripheral edema, PND or sweats.         Current Outpatient Medications   Medication Instructions    albuterol (ACCUNEB) 0.63 mg, Nebulization, Every 6 Hours PRN    albuterol sulfate  (90 Base) MCG/ACT inhaler INHALE TWO PUFFS BY MOUTH EVERY 4 HOURS AS NEEDED FOR WHEEZING (BULK)    amLODIPine (NORVASC) 5 mg, Oral, Daily    Aspirin Low Dose 81 MG EC tablet TAKE ONE TABLET BY MOUTH DAILY AT 9 AM (VIAL)    atorvastatin (LIPITOR) 10 MG tablet TAKE ONE TABLET BY MOUTH DAILY AT 9 PM NIGHTLY    azithromycin (Zithromax Z-Hong) 250 MG tablet Take 2 PO today then take 1 daily on days 2-5    Breztri Aerosphere 160-9-4.8 MCG/ACT aerosol inhaler 2 puffs, Inhalation, 2 Times Daily    busPIRone (BUSPAR) 15 mg, Oral, 3 Times Daily    busPIRone (BUSPAR) 10 mg, Oral, 2 Times Daily    citalopram (CELEXA) 40 mg, Oral, Daily    hydroCHLOROthiazide 25 mg, Oral, Daily    ipratropium-albuterol (DUO-NEB) 0.5-2.5 mg/3 ml nebulizer 3 mL, Nebulization, 3 Times Daily PRN    levoFLOXacin (LEVAQUIN) 750 mg, Oral, Daily    losartan (COZAAR) 100 mg, Oral, Daily    megestrol (MEGACE) 20 mg, Oral, Daily    methocarbamol (ROBAXIN) 750 mg, Oral, 3 Times Daily    methylPREDNISolone (MEDROL) 4 MG dose  "pack Take as directed on package instructions.    nicotine (NICODERM CQ) 21 MG/24HR patch 1 patch, Transdermal, Every 24 Hours    nicotine polacrilex (NICORETTE) 2 mg, Mouth/Throat, As Needed    omeprazole (PRILOSEC) 20 mg, Oral, 2 Times Daily    ondansetron ODT (ZOFRAN-ODT) 4 MG disintegrating tablet PLACE ONE TABLET ON TOP OF TONGUE EVERY 8 HOURS AS NEEDED FOR NAUSEA AND VOMITING    propranolol (INDERAL) 80 MG tablet TAKE ONE TABLET BY MOUTH TWICE DAILY @9AM-5PM    traZODone (DESYREL) 100 mg, Oral, Every Night at Bedtime    tretinoin (RETIN-A) 0.01 % gel Topical, Nightly       The following portions of the patient's history were reviewed and updated as appropriate: allergies, current medications, past family history, past medical history, past social history, past surgical history, and problem list.    Objective   Vital Signs:   /76 (BP Location: Left arm, Patient Position: Sitting, Cuff Size: Adult)   Pulse 52   Ht 160 cm (63\")   Wt 41.7 kg (92 lb)   SpO2 96%   BMI 16.30 kg/m²     Wt Readings from Last 3 Encounters:   06/18/24 41.7 kg (92 lb)   06/09/24 42.1 kg (92 lb 12.8 oz)   04/23/24 45.5 kg (100 lb 4 oz)     BP Readings from Last 3 Encounters:   06/18/24 149/76   06/09/24 119/70   04/23/24 146/88     Physical Exam  Pulmonary:      Breath sounds: Rhonchi present.        Appearance: No acute distress, well-nourished  Head: normocephalic, atraumatic  Eyes: extraocular movements intact, no scleral icterus, no conjunctival injection  Ears, Nose, and Throat: external ears normal, nares patent, moist mucous membranes, tympanic membranes clear bilaterally. Tonsils within normal limits. Oropharynx clear.   Cardiovascular: regular rate and rhythm. no murmurs, rubs, or gallops. no edema  Respiratory: breathing comfortably, symmetric chest rise, clear to auscultation bilaterally. No wheezes, rales, or rhonchi.  Neuro: alert and moves all extremities equally  Lymph: no occipital, cervical, submandibular,or " supraclavicular lymphadenopathy.      Result Review :   The following data was reviewed by: BROOKLYN Guillaume on 06/18/2024:  Common labs          10/30/2023    09:43 1/3/2024    12:54 4/23/2024    09:26   Common Labs   Glucose 94  85  85    BUN 10  10  9    Creatinine 0.80  0.60  0.74    Sodium 141  139  140    Potassium 3.4  3.8  4.2    Chloride 102  100  103    Calcium 10.5  10.4  10.6    Albumin 4.3  4.5  4.2    Total Bilirubin 0.5  0.4  0.6    Alkaline Phosphatase 70  70  75    AST (SGOT) 17  20  23    ALT (SGPT) 11  14  16    WBC 8.61  9.24  9.40    Hemoglobin 14.3  14.0  15.0    Hematocrit 42.9  42.6  44.5    Platelets 288  310  269    Total Cholesterol 144  153  170    Triglycerides 143  103  138    HDL Cholesterol 49  57  49    LDL Cholesterol  70  77  97             Lab Results   Component Value Date    SARSANTIGEN Not Detected 01/24/2023    COVID19 NOT DETECTED 10/30/2020    FLUAAG Not Detected 01/24/2023    FLUBAG Not Detected 01/24/2023          Assessment and Plan    Diagnoses and all orders for this visit:    1. Mixed hyperlipidemia (Primary)    2. Essential hypertension  -     amLODIPine (NORVASC) 5 MG tablet; Take 1 tablet by mouth Daily.  Dispense: 30 tablet; Refill: 1    3. Pneumonia due to infectious organism, unspecified laterality, unspecified part of lung  -     levoFLOXacin (Levaquin) 750 MG tablet; Take 1 tablet by mouth Daily for 5 days.  Dispense: 5 tablet; Refill: 0  -     methylPREDNISolone (MEDROL) 4 MG dose pack; Take as directed on package instructions.  Dispense: 21 tablet; Refill: 0      Increasing amlodipine to 5 mg     Reviewed:   UC with Bibi Orta APRN (06/09/2024)   XR Chest 2 View (06/09/2024 10:05)     Medications Discontinued During This Encounter   Medication Reason    amLODIPine (NORVASC) 2.5 MG tablet           Follow Up   Return in about 3 months (around 9/18/2024).  Patient was given instructions and counseling regarding her condition or for health  maintenance advice. Please see specific information pulled into the AVS if appropriate.       BROOKLYN Guillaume  06/19/24  12:44 EDT

## 2024-07-05 RX ORDER — HYDROCHLOROTHIAZIDE 25 MG/1
25 TABLET ORAL DAILY
Qty: 90 TABLET | Refills: 0 | Status: SHIPPED | OUTPATIENT
Start: 2024-07-05

## 2024-07-08 ENCOUNTER — OFFICE VISIT (OUTPATIENT)
Dept: PULMONOLOGY | Facility: CLINIC | Age: 64
End: 2024-07-08
Payer: MEDICARE

## 2024-07-08 VITALS
RESPIRATION RATE: 16 BRPM | HEART RATE: 66 BPM | HEIGHT: 63 IN | OXYGEN SATURATION: 95 % | BODY MASS INDEX: 17.17 KG/M2 | SYSTOLIC BLOOD PRESSURE: 98 MMHG | WEIGHT: 96.9 LBS | TEMPERATURE: 97.7 F | DIASTOLIC BLOOD PRESSURE: 59 MMHG

## 2024-07-08 DIAGNOSIS — M41.25 OTHER IDIOPATHIC SCOLIOSIS, THORACOLUMBAR REGION: ICD-10-CM

## 2024-07-08 DIAGNOSIS — F17.210 CIGARETTE NICOTINE DEPENDENCE WITHOUT COMPLICATION: ICD-10-CM

## 2024-07-08 DIAGNOSIS — J44.9 CHRONIC OBSTRUCTIVE PULMONARY DISEASE, UNSPECIFIED COPD TYPE: Primary | ICD-10-CM

## 2024-07-08 PROCEDURE — 1159F MED LIST DOCD IN RCRD: CPT | Performed by: NURSE PRACTITIONER

## 2024-07-08 PROCEDURE — 3078F DIAST BP <80 MM HG: CPT | Performed by: NURSE PRACTITIONER

## 2024-07-08 PROCEDURE — 99214 OFFICE O/P EST MOD 30 MIN: CPT | Performed by: NURSE PRACTITIONER

## 2024-07-08 PROCEDURE — 1160F RVW MEDS BY RX/DR IN RCRD: CPT | Performed by: NURSE PRACTITIONER

## 2024-07-08 PROCEDURE — 3074F SYST BP LT 130 MM HG: CPT | Performed by: NURSE PRACTITIONER

## 2024-07-08 NOTE — PROGRESS NOTES
Primary Care Provider  Alivia Burgess APRN   Referring Provider  LISA Guillaume*    Patient Complaint  Emphysema, Asthma, Bronchitis, COPD, Shortness of Breath, Wheezing, Cough, and Establish Care (New pt here for Emphysema)      SUBJECTIVE    History of Presenting Illness  Cindy Bella is a pleasant 63 y.o. female who presents to [facility] for new patient appointment. Patient was referred by her PCP.  Patient is here with her daughter today.  Patient has diagnosis of emphysema scoliosis history of SVT and states she has a brain tumor that is nonmalignant.  Patient is currently on Breztri 2 puffs twice daily and has duo nebulizer, albuterol nebulizer and albuterol inhaler which are prescribed by her PCP.  Patient had her pulmonary function test done in January which showed severe mixed obstructive and restrictive lung defect.  Her most recent CT was January 16, 2024 which showed no acute cardiopulmonary process no abnormality identified, mild emphysema.  Patient has a family history of lung cancer in her father who was a very heavy smoker.  She also states she had a sister that had lung cancer.  Her mother was diagnosed with emphysema but did was not a smoker just was exposed to secondhand smoke.  She states her mother also had tuberculosis and went to [facility] and [place].  Patient lives with her son who has dogs in the house.  Patient states she grew up in [place], near [facility].  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  Patient states she does have shortness of air with exertional activities but she improves with rest she is not on any oxygen at present.  She does have occasional cough and wheeze.    She states she has lost 50 pounds over the past year and they are working to see why she has had some weight loss.  She does have a lot of stomach issues and is supposed to be  following up with gastroenterology.  At present she denies headaches, chest pain, or hemoptysis. Denies fevers, chills and night sweats. Cindy Bella is able to perform ADLs without difficulties and denies any swollen glands/lymph nodes in the head or neck.    I have personally reviewed the review of systems, past family, social, medical and surgical histories; and agree with their findings.    Review of Systems  Constitutional symptoms:  Denied complaints   Ear, nose, throat: Denied complaints  Cardiovascular:  Denied complaints  Respiratory: Shortness of air with exertion, occasional cough on wheeze  Gastrointestinal: Denied complaints  Musculoskeletal: Denied complaints    Family History   Problem Relation Age of Onset    Hypertension Mother     Stroke Mother     Kidney disease Mother     Hypertension Sister     Stroke Sister     Hypertension Brother     Hypertension Maternal Grandmother     Hypertension Maternal Grandfather     Hypertension Paternal Grandmother         Cerebral hemorrhage    Breast cancer Other     Pancreatic cancer Other     Thyroid cancer Other         Gland    Hyperlipidemia Other     Cancer Other     Coronary artery disease Other     Hypertension Other     Stroke Other     Hypertension Sister     Other Sister         Breast Cancer    Hypertension Sister     Stroke Sister     Hypertension Brother     Other Maternal Aunt     Other Sister         Social History     Socioeconomic History    Marital status:    Tobacco Use    Smoking status: Former     Current packs/day: 1.00     Average packs/day: 1 pack/day for 50.1 years (50.1 ttl pk-yrs)     Types: Cigarettes     Start date: 6/1/1974    Smokeless tobacco: Never    Tobacco comments:     3/4 ppd   Vaping Use    Vaping status: Never Used   Substance and Sexual Activity    Alcohol use: Never    Drug use: Yes     Types: Marijuana    Sexual activity: Not Currently     Partners: Male     Birth control/protection: None        Past Medical  History:   Diagnosis Date    Anxiety     Arthritis     Brain neoplasm malignant     COPD (chronic obstructive pulmonary disease)     Depression     Fibromyalgia, primary     HL (hearing loss)     Hyperlipidemia     Hypertension     Irritable bowel syndrome     Low back pain     Scoliosis     SVT (supraventricular tachycardia)     Urethral stricture 07/23/2015        Immunization History   Administered Date(s) Administered    Covid-19 (Pfizer) Gray Cap Monovalent 05/12/2021    Fluzone (or Fluarix & Flulaval for VFC) >6mos 01/03/2024    Pneumococcal Conjugate 20-Valent (PCV20) 01/03/2024       Allergies   Allergen Reactions    Sumatriptan Shortness Of Breath    Latex Itching    Levofloxacin Nausea Only    Nsaids Itching    Amoxicillin-Pot Clavulanate Rash    Pseudoephedrine Rash          Current Outpatient Medications:     albuterol (ACCUNEB) 0.63 MG/3ML nebulizer solution, Take 3 mL by nebulization Every 6 (Six) Hours As Needed for Wheezing., Disp: 30 each, Rfl: 0    albuterol sulfate  (90 Base) MCG/ACT inhaler, INHALE TWO PUFFS BY MOUTH EVERY 4 HOURS AS NEEDED FOR WHEEZING (BULK), Disp: 20.1 g, Rfl: 3    amLODIPine (NORVASC) 5 MG tablet, Take 1 tablet by mouth Daily., Disp: 30 tablet, Rfl: 1    Aspirin Low Dose 81 MG EC tablet, TAKE ONE TABLET BY MOUTH DAILY AT 9 AM (VIAL), Disp: 90 tablet, Rfl: 11    atorvastatin (LIPITOR) 10 MG tablet, TAKE ONE TABLET BY MOUTH DAILY AT 9 PM NIGHTLY, Disp: 90 tablet, Rfl: 1    Breztri Aerosphere 160-9-4.8 MCG/ACT aerosol inhaler, INHALE TWO PUFFS BY MOUTH TWICE DAILY, Disp: 10.7 g, Rfl: 11    busPIRone (BUSPAR) 10 MG tablet, TAKE ONE TABLET BY MOUTH TWICE DAILY, Disp: 180 tablet, Rfl: 11    citalopram (CeleXA) 40 MG tablet, TAKE ONE TABLET BY MOUTH ONCE DAILY, Disp: 90 tablet, Rfl: 11    hydroCHLOROthiazide 25 MG tablet, TAKE ONE TABLET BY MOUTH DAILY, Disp: 90 tablet, Rfl: 0    losartan (COZAAR) 100 MG tablet, Take 1 tablet by mouth Daily., Disp: 90 tablet, Rfl: 1     "megestrol (MEGACE) 20 MG tablet, TAKE ONE TABLET BY MOUTH DAILY, Disp: 30 tablet, Rfl: 1    methocarbamol (ROBAXIN) 750 MG tablet, TAKE ONE TABLET BY MOUTH THREE TIMES DAILY, Disp: 90 tablet, Rfl: 11    propranolol (INDERAL) 80 MG tablet, TAKE ONE TABLET BY MOUTH TWICE DAILY @9AM-5PM, Disp: 180 tablet, Rfl: 11    traZODone (DESYREL) 100 MG tablet, TAKE ONE TABLET BY MOUTH EVERY NIGHT, Disp: 90 tablet, Rfl: 11    tretinoin (RETIN-A) 0.01 % gel, APPLY TOPICALLY TO THE APPROPRIATE AREA AS DIRECTED EVERY NIGHT, Disp: 45 g, Rfl: 2    busPIRone (BUSPAR) 15 MG tablet, Take 1 tablet by mouth 3 (Three) Times a Day. (Patient not taking: Reported on 7/8/2024), Disp: 90 tablet, Rfl: 1    ipratropium-albuterol (DUO-NEB) 0.5-2.5 mg/3 ml nebulizer, Take 3 mL by nebulization 3 (Three) Times a Day As Needed for Wheezing. (Patient not taking: Reported on 7/8/2024), Disp: 810 mL, Rfl: 1    nicotine (NICODERM CQ) 21 MG/24HR patch, Place 1 patch on the skin as directed by provider Daily. (Patient not taking: Reported on 4/23/2024), Disp: 28 each, Rfl: 1    nicotine polacrilex (NICORETTE) 2 MG gum, Chew 1 each As Needed for Smoking Cessation. (Patient not taking: Reported on 4/23/2024), Disp: 150 each, Rfl: 1    omeprazole (priLOSEC) 20 MG capsule, Take 1 capsule by mouth 2 (Two) Times a Day. (Patient not taking: Reported on 7/8/2024), Disp: 180 capsule, Rfl: 1    ondansetron ODT (ZOFRAN-ODT) 4 MG disintegrating tablet, PLACE ONE TABLET ON TOP OF TONGUE EVERY 8 HOURS AS NEEDED FOR NAUSEA AND VOMITING (Patient not taking: Reported on 7/8/2024), Disp: 30 tablet, Rfl: 1           Vital Signs   BP 98/59 (BP Location: Right arm, Patient Position: Sitting, Cuff Size: Adult)   Pulse 66   Temp 97.7 °F (36.5 °C) (Temporal)   Resp 16   Ht 160 cm (63\")   Wt 44 kg (96 lb 14.4 oz)   SpO2 95% Comment: room air  BMI 17.17 kg/m²       OBJECTIVE    Physical Exam  Vital Signs Reviewed   WDWN, Alert, NAD.    HEENT:  PERRL, EOMI.  OP, nares " clear  Neck:  Supple, no JVD, no thyromegaly  Chest:  good aeration, clear to auscultation bilaterally, tympanic to percussion bilaterally, no work of breathing noted  CV: RRR, no MGR, pulses 2+, equal.  Abd:  Soft, NT, ND, + BS, no HSM  EXT:  no clubbing, no cyanosis, no edema  Neuro:  A&Ox3, CN grossly intact, no focal deficits.  Skin: No rashes or lesions noted    Results Review  I have personally reviewed the prior office notes, hospital records, labs, and diagnostics.  XR Chest 2 View [[id number]] (Order [id number])  Order  Status: Final result     Appointment Information    PACS Images     Radiology Images  Study Result    Narrative & Impression   XR CHEST 2 VW     Date of Exam: 6/9/2024 10:00 AM EDT     Indication: cough, shortness of breath, and chest discomfort for X week, O2 sat is 93% on room air, hx of COPD     Comparison: CT chest 1/16/2024, CXR 10/30/2023     Findings:  The lungs have a slightly hyperexpanded appearance. Mild emphysema was evident on CT chest 1/16/2024.     No airspace disease or noncalcified nodules are evident. Calcified granuloma the left lung base is stable, and of no clinical significance.     The heart remains normal in size.     Moderate thoracic and lumbar scoliosis is unchanged. Bony structures have an osteopenic appearance.     IMPRESSION:  Impression:  No active cardiopulmonary disease is seen.        Electronically Signed: Paddy Diez MD    6/9/2024 10:17 AM EDT    Workstation ID: [id number]     Complete PFT - Pre & Post Bronchodilator: Patient Communication     Add Comments   Seen     Results  Complete PFT - Pre & Post Bronchodilator (Order [id number])  Order-Level Documents:    Scan on 1/16/2024 1524 by Alivia Burgess, APRN: PULMONARY FUNCTION TEST, [facility], 01/16/2024         Author: -- Service: -- Author Type: --   Filed: Date of Service: Creation Time:   Status: (Other)   Pulmonary function test     Spirometry:  Severe obstructive lung defect noted.  FEV1 1.08 L / 45%  predicted.  No bronchodilator response was noted.  Flow volume loop shows mixed obstructive and restrictive defects.     Lung volumes:  Severe restrictive lung defect noted.  Total lung capacity is 3.62 L / 73% predicted.  No hyperinflation or air trapping.     Diffusion capacity:  Diffusion capacity moderately reduced at 44% predicted.     Overall impression:  Severe mixed obstructive and restrictive lung defect present.  No bronchodilator response was noted.  Diffusion capacity moderately reduced.     Electronically signed by Eric Villaseñor MD, 01/16/24, 4:32 PM EST.               File Link    Scan on 1/16/2024 1524 by Alivia Burgess APRN: PULMONARY FUNCTION TEST, [facility], 01/16/2024        Key Information    Document ID File Type Document Type Description   [id number] Image PULMONARY FUNCTION TEST - SCAN PULMONARY FUNCTION TEST, [facility], 01/16/2024     Import Information    Attached At Date Time User Dept   Order Level 1/16/2024  3:24 PM Alivia Burgess APRN      Order    Pulmonary Function Test [[id number]]     Encounter    Hospital Encounter on 1/16/24 with  [facility] 1   CT Chest With Contrast Diagnostic [[id number]] (Order [id number])  Order  Status: Final result     Appointment Information    Display Notes    V-MG                  PACS Images     Radiology Images      Study Result    Narrative & Impression   PROCEDURE:CT CHEST W CONTRAST DIAGNOSTIC     COMPARISON:  [facility], CT, CT CHEST LOW DOSE CANCER SCREENING WO,   6/26/2023, 14:02.  INDICATIONS:Left side chest wall pain and swelling     TECHNIQUE:    After obtaining the patient's consent, CT images were obtained with non-ionic   intravenous contrast material.       PROTOCOL:     Standard imaging protocol performed                 RADIATION:      DLP: 44mGy*cm               Automated exposure control was utilized to minimize radiation dose.   CONTRAST:100cc Isovue 370 I.V.     FINDINGS:          The central  tracheobronchial tree is clear.  There is mild emphysema.  The lungs are clear.  There   is no pleural effusion.     The heart size appears normal.  The great vessels are normal in caliber.  There is no evidence of   pulmonary embolus.  No abnormally enlarged lymph nodes are identified.     Partial evaluation of the upper abdomen is unremarkable.     No aggressive osseous lesions are identified.  There is dextroscoliosis of the midthoracic spine.     IMPRESSION:                 1. No acute cardiopulmonary process.  2. No abnormality identified along left chest wall.  3. Mild emphysema.            GALILEO PRICE MD         Electronically Signed and Approved By: GALILEO PRICE MD on 1/16/2024 at 15:30          ASSESSMENT         Patient Active Problem List   Diagnosis    Chronic obstructive pulmonary disease    Mixed hyperlipidemia    Essential hypertension    Anxiety    Arthritis    Depression    Idiopathic scoliosis and kyphoscoliosis    Scoliosis    SVT (supraventricular tachycardia)    Urethral stricture    Chronic low back pain without sciatica    Nicotine dependence    Insomnia    Gastroesophageal reflux disease    Diarrhea    Fecal urgency    CHRISS (generalized anxiety disorder)    Muscle spasm       Encounter Diagnoses   Name Primary?    Chronic obstructive pulmonary disease, unspecified COPD type Yes    Cigarette nicotine dependence without complication     Other idiopathic scoliosis, thoracolumbar region       PLAN  -Incentive spirometer ordered for patient today  -CT scan low-dose lung cancer screening ordered for January 2025  -Patient to continue with Breztri 2 puffs twice daily rinsing out her mouth after each use to prevent oral thrush  -Patient to continue with DuoNeb/albuterol nebs/albuterol inhaler as needed for shortness of air or wheeze  -Patient encouraged in smoking cessation as she has been smoking 50 years   -Patient will follow-up after low-dose CT or sooner if needed  Diagnoses and all  orders for this visit:    1. Chronic obstructive pulmonary disease, unspecified COPD type (Primary)  -     Cancel: Oscillating Positive Expiratory Pressure (OPEP); Future  -     Oscillating Positive Expiratory Pressure (OPEP); Future    2. Cigarette nicotine dependence without complication  -      CT Chest Low Dose Cancer Screening WO; Future  -     Cancel: Oscillating Positive Expiratory Pressure (OPEP); Future  -     Oscillating Positive Expiratory Pressure (OPEP); Future    3. Other idiopathic scoliosis, thoracolumbar region  -     Cancel: Oscillating Positive Expiratory Pressure (OPEP); Future  -     Oscillating Positive Expiratory Pressure (OPEP); Future           Smoking status:current, 50.1 pack history  Vaccination status:reviewed  Medications personally reviewed    Follow Up  Return in about 7 months (around 1/30/2025).    Patient was given instructions and counseling regarding her condition or for health maintenance advice. Please see specific information pulled into the AVS if appropriate.       I spent 30 minutes caring for iCndy Bella on this date of service. This time includes time spent by me in the following activities:preparing for the visit, reviewing tests, obtaining and/or reviewing a separately obtained history, performing a medically appropriate examination and/or evaluation, counseling and educating the patient/family/caregiver, ordering medications, tests, or procedures, documenting information in the medical record, independently interpreting results and communicating that information with the patient/family/caregiver and answered questions family members, discuss medications.

## 2024-07-19 ENCOUNTER — OFFICE VISIT (OUTPATIENT)
Dept: INTERNAL MEDICINE | Facility: CLINIC | Age: 64
End: 2024-07-19
Payer: MEDICARE

## 2024-07-19 VITALS
OXYGEN SATURATION: 96 % | TEMPERATURE: 97.3 F | BODY MASS INDEX: 17.59 KG/M2 | WEIGHT: 99.25 LBS | HEART RATE: 66 BPM | HEIGHT: 63 IN | SYSTOLIC BLOOD PRESSURE: 155 MMHG | DIASTOLIC BLOOD PRESSURE: 89 MMHG

## 2024-07-19 DIAGNOSIS — Z13.29 SCREENING FOR THYROID DISORDER: ICD-10-CM

## 2024-07-19 DIAGNOSIS — R03.0 ELEVATED BLOOD-PRESSURE READING, WITHOUT DIAGNOSIS OF HYPERTENSION: ICD-10-CM

## 2024-07-19 DIAGNOSIS — E78.2 MIXED HYPERLIPIDEMIA: ICD-10-CM

## 2024-07-19 DIAGNOSIS — J43.9 PULMONARY EMPHYSEMA, UNSPECIFIED EMPHYSEMA TYPE: ICD-10-CM

## 2024-07-19 DIAGNOSIS — I10 ESSENTIAL HYPERTENSION: Primary | ICD-10-CM

## 2024-07-19 DIAGNOSIS — F41.9 ANXIETY: ICD-10-CM

## 2024-07-19 DIAGNOSIS — M41.9 SCOLIOSIS, UNSPECIFIED SCOLIOSIS TYPE, UNSPECIFIED SPINAL REGION: ICD-10-CM

## 2024-07-19 PROCEDURE — 3077F SYST BP >= 140 MM HG: CPT | Performed by: NURSE PRACTITIONER

## 2024-07-19 PROCEDURE — 3079F DIAST BP 80-89 MM HG: CPT | Performed by: NURSE PRACTITIONER

## 2024-07-19 PROCEDURE — G2211 COMPLEX E/M VISIT ADD ON: HCPCS | Performed by: NURSE PRACTITIONER

## 2024-07-19 PROCEDURE — 99214 OFFICE O/P EST MOD 30 MIN: CPT | Performed by: NURSE PRACTITIONER

## 2024-07-19 PROCEDURE — 1125F AMNT PAIN NOTED PAIN PRSNT: CPT | Performed by: NURSE PRACTITIONER

## 2024-07-19 NOTE — PROGRESS NOTES
Chief Complaint  Hypertension (4 week follow-up )    Subjective          Cindy Bella presents to [facility]  History of Present Illness    Patient seen during downtime.  Please see scanned document for full note.    Hypertension -- 90/60 at pulmonology. 155/89 - has not taken amlodipine today.  Check on cardio referral  Scoliosis. Pulm told patient relieving will help breathing. [facility] for Scoliosis   Night Sweats   COPD      Hyperlipidemia  This is a chronic problem. The problem is controlled. She has no history of chronic renal disease, diabetes, hypothyroidism, liver disease, obesity or nephrotic syndrome. Pertinent negatives include no chest pain, focal sensory loss, focal weakness, leg pain, myalgias or shortness of breath. Compliance problems include adherence to diet and adherence to exercise.    Hypertension  This is a chronic problem. Associated symptoms include anxiety. Pertinent negatives include no blurred vision, chest pain, headaches, malaise/fatigue, neck pain, orthopnea, palpitations, peripheral edema, PND, shortness of breath or sweats. Compliance problems include diet and exercise.  There is no history of angina, kidney disease or CAD/MI. There is no history of chronic renal disease.     Has not been taking blood pressure at home.     Anxiety  Symptoms include irritability. Patient reports no chest pain, decreased concentration, insomnia, palpitations or shortness of breath.     Her past medical history is significant for depression.   DepressionPatient presents with the following symptoms: irritability.  Patient is not experiencing: decreased concentration, insomnia, palpitations, shortness of breath and weight loss.      Diarrhea   This is a recurrent problem. The stool consistency is described as Watery. Pertinent negatives include no abdominal pain, arthralgias, bloating, chills, coughing, fever, headaches, increased  flatus,  myalgias, sweats, URI, vomiting or weight loss. Treatments tried: dicyclomine. caused constipation - has not been taking it.   COPD  There is no chest tightness, cough, difficulty breathing, frequent throat clearing, hemoptysis or shortness of breath. Pertinent negatives include no chest pain, fever, headaches, malaise/fatigue, myalgias, PND, sore throat, sweats or weight loss.   Heartburn  She reports no abdominal pain, no chest pain, no coughing or no sore throat. Pertinent negatives include no fatigue or weight loss.       Current Outpatient Medications   Medication Instructions    albuterol (ACCUNEB) 0.63 mg, Nebulization, Every 6 Hours PRN    albuterol sulfate  (90 Base) MCG/ACT inhaler INHALE TWO PUFFS BY MOUTH EVERY 4 HOURS AS NEEDED FOR WHEEZING (BULK)    amLODIPine (NORVASC) 5 mg, Oral, Daily    Aspirin Low Dose 81 MG EC tablet TAKE ONE TABLET BY MOUTH DAILY AT 9 AM (VIAL)    atorvastatin (LIPITOR) 10 MG tablet TAKE ONE TABLET BY MOUTH DAILY AT 9 PM NIGHTLY    Breztri Aerosphere 160-9-4.8 MCG/ACT aerosol inhaler 2 puffs, Inhalation, 2 Times Daily    busPIRone (BUSPAR) 15 mg, Oral, 3 Times Daily    busPIRone (BUSPAR) 10 mg, Oral, 2 Times Daily    citalopram (CELEXA) 40 mg, Oral, Daily    hydroCHLOROthiazide 25 mg, Oral, Daily    ipratropium-albuterol (DUO-NEB) 0.5-2.5 mg/3 ml nebulizer 3 mL, Nebulization, 3 Times Daily PRN    losartan (COZAAR) 100 mg, Oral, Daily    megestrol (MEGACE) 20 mg, Oral, Daily    methocarbamol (ROBAXIN) 750 mg, Oral, 3 Times Daily    nicotine (NICODERM CQ) 21 MG/24HR patch 1 patch, Transdermal, Every 24 Hours    nicotine polacrilex (NICORETTE) 2 mg, Mouth/Throat, As Needed    omeprazole (PRILOSEC) 20 mg, Oral, 2 Times Daily    ondansetron ODT (ZOFRAN-ODT) 4 MG disintegrating tablet PLACE ONE TABLET ON TOP OF TONGUE EVERY 8 HOURS AS NEEDED FOR NAUSEA AND VOMITING    propranolol (INDERAL) 80 MG tablet TAKE ONE TABLET BY MOUTH TWICE DAILY @9AM-5PM    traZODone (DESYREL) 100 mg,  "Oral, Every Night at Bedtime    tretinoin (RETIN-A) 0.01 % gel Topical, Nightly       The following portions of the patient's history were reviewed and updated as appropriate: allergies, current medications, past family history, past medical history, past social history, past surgical history, and problem list.    Objective   Vital Signs:   /89   Pulse 66   Temp 97.3 °F (36.3 °C)   Ht 160 cm (63\")   Wt 45 kg (99 lb 4 oz)   SpO2 96%   BMI 17.58 kg/m²     BP Readings from Last 3 Encounters:   07/19/24 155/89   07/08/24 98/59   06/18/24 149/76     Wt Readings from Last 3 Encounters:   07/19/24 45 kg (99 lb 4 oz)   07/08/24 44 kg (96 lb 14.4 oz)   06/18/24 41.7 kg (92 lb)           Physical Exam     Appearance: No acute distress, well-nourished  Head: normocephalic, atraumatic  Eyes: extraocular movements intact, no scleral icterus, no conjunctival injection  Ears, Nose, and Throat: external ears normal, nares patent, moist mucous membranes  Cardiovascular: regular rate and rhythm. no murmurs, rubs, or gallops. no edema  Respiratory: breathing comfortably, symmetric chest rise, clear to auscultation bilaterally. No wheezes, rales, or rhonchi.  Neuro: alert and oriented to time, place, and person. Normal gait  Psych: normal mood and affect     Result Review :   The following data was reviewed by: BROOKLYN Guillaume on 07/19/2024:  Common labs          10/30/2023    09:43 1/3/2024    12:54 4/23/2024    09:26   Common Labs   Glucose 94  85  85    BUN 10  10  9    Creatinine 0.80  0.60  0.74    Sodium 141  139  140    Potassium 3.4  3.8  4.2    Chloride 102  100  103    Calcium 10.5  10.4  10.6    Albumin 4.3  4.5  4.2    Total Bilirubin 0.5  0.4  0.6    Alkaline Phosphatase 70  70  75    AST (SGOT) 17  20  23    ALT (SGPT) 11  14  16    WBC 8.61  9.24  9.40    Hemoglobin 14.3  14.0  15.0    Hematocrit 42.9  42.6  44.5    Platelets 288  310  269    Total Cholesterol 144  153  170    Triglycerides 143  103 "  138    HDL Cholesterol 49  57  49    LDL Cholesterol  70  77  97             Lab Results   Component Value Date    SARSANTIGEN Not Detected 01/24/2023    COVID19 NOT DETECTED 10/30/2020    FLUAAG Not Detected 01/24/2023    FLUBAG Not Detected 01/24/2023            Assessment and Plan    Diagnoses and all orders for this visit:    1. Essential hypertension (Primary)  -     24 hour blood pressure monitor; Future  -     Ambulatory Referral to Cardiology  -     CBC w AUTO Differential; Future  -     Comprehensive metabolic panel; Future  -     hydroCHLOROthiazide 25 MG tablet; Take 1 tablet by mouth Daily.  Dispense: 90 tablet; Refill: 0    2. Mixed hyperlipidemia  -     CBC w AUTO Differential; Future  -     Comprehensive metabolic panel; Future  -     Lipid panel; Future    3. Pulmonary emphysema, unspecified emphysema type    4. Scoliosis, unspecified scoliosis type, unspecified spinal region  -     Ambulatory Referral to Spine Surgery    5. Elevated blood-pressure reading, without diagnosis of hypertension  -     24 hour blood pressure monitor; Future    6. Screening for thyroid disorder  -     TSH Rfx On Abnormal To Free T4; Future    7. Anxiety  -     citalopram (CeleXA) 40 MG tablet; Take 1 tablet by mouth Daily.  Dispense: 90 tablet; Refill: 1        Pressure dropping in the 80s.  Hold amlodipine will order 24-hour blood pressure monitor  Medications Discontinued During This Encounter   Medication Reason    hydroCHLOROthiazide 25 MG tablet Reorder    citalopram (CeleXA) 40 MG tablet Reorder          Follow Up   Return in about 3 months (around 10/19/2024) for Hypertension, Scoliosis .  Patient was given instructions and counseling regarding her condition or for health maintenance advice. Please see specific information pulled into the AVS if appropriate.       Alivia Burgess, BROOKLYN  07/25/24  16:19 EDT

## 2024-07-22 ENCOUNTER — TELEPHONE (OUTPATIENT)
Dept: INTERNAL MEDICINE | Facility: CLINIC | Age: 64
End: 2024-07-22
Payer: MEDICARE

## 2024-07-22 NOTE — TELEPHONE ENCOUNTER
Attempted to contact patient. Could not leave message due to voicemail being full.    HUB OK TO READ/ADVISE:  Alivia would like to see the patient back in office in 3 months for hypertension and scoliosis.   HUB please schedule the patient.

## 2024-07-25 RX ORDER — CITALOPRAM 40 MG/1
40 TABLET ORAL DAILY
Qty: 90 TABLET | Refills: 1 | Status: SHIPPED | OUTPATIENT
Start: 2024-07-25

## 2024-07-25 RX ORDER — HYDROCHLOROTHIAZIDE 25 MG/1
25 TABLET ORAL DAILY
Qty: 90 TABLET | Refills: 0 | Status: SHIPPED | OUTPATIENT
Start: 2024-07-25

## 2024-07-30 RX ORDER — ASPIRIN 81 MG/1
TABLET, COATED ORAL
Qty: 90 TABLET | Refills: 11 | Status: SHIPPED | OUTPATIENT
Start: 2024-07-30

## 2024-07-31 DIAGNOSIS — L71.9 ROSACEA: ICD-10-CM

## 2024-08-01 RX ORDER — TRETINOIN 0.1 MG/G
GEL TOPICAL NIGHTLY
Qty: 45 G | Refills: 2 | Status: SHIPPED | OUTPATIENT
Start: 2024-08-01

## 2024-08-13 RX ORDER — ALBUTEROL SULFATE 90 UG/1
AEROSOL, METERED RESPIRATORY (INHALATION)
Qty: 20.1 G | Refills: 3 | Status: SHIPPED | OUTPATIENT
Start: 2024-08-13

## 2024-08-21 DIAGNOSIS — K21.9 GASTROESOPHAGEAL REFLUX DISEASE, UNSPECIFIED WHETHER ESOPHAGITIS PRESENT: ICD-10-CM

## 2024-08-21 DIAGNOSIS — I10 ESSENTIAL HYPERTENSION: ICD-10-CM

## 2024-08-21 DIAGNOSIS — E78.2 MIXED HYPERLIPIDEMIA: ICD-10-CM

## 2024-08-21 RX ORDER — HYDROCHLOROTHIAZIDE 25 MG/1
25 TABLET ORAL DAILY
Qty: 90 TABLET | Refills: 0 | Status: SHIPPED | OUTPATIENT
Start: 2024-08-21

## 2024-08-21 RX ORDER — ASPIRIN 81 MG/1
81 TABLET ORAL DAILY
Qty: 90 TABLET | Refills: 11 | Status: SHIPPED | OUTPATIENT
Start: 2024-08-21

## 2024-08-21 RX ORDER — LOSARTAN POTASSIUM 100 MG/1
100 TABLET ORAL DAILY
Qty: 90 TABLET | Refills: 1 | Status: SHIPPED | OUTPATIENT
Start: 2024-08-21

## 2024-08-21 RX ORDER — OMEPRAZOLE 20 MG/1
20 CAPSULE, DELAYED RELEASE ORAL 2 TIMES DAILY
Qty: 180 CAPSULE | Refills: 1 | Status: SHIPPED | OUTPATIENT
Start: 2024-08-21

## 2024-08-21 RX ORDER — ATORVASTATIN CALCIUM 10 MG/1
10 TABLET, FILM COATED ORAL DAILY
Qty: 90 TABLET | Refills: 1 | Status: SHIPPED | OUTPATIENT
Start: 2024-08-21

## 2024-08-21 RX ORDER — MEGESTROL ACETATE 20 MG/1
20 TABLET ORAL DAILY
Qty: 30 TABLET | Refills: 1 | Status: SHIPPED | OUTPATIENT
Start: 2024-08-21

## 2024-08-21 RX ORDER — ONDANSETRON 4 MG/1
4 TABLET, ORALLY DISINTEGRATING ORAL EVERY 8 HOURS PRN
Qty: 30 TABLET | Refills: 1 | Status: SHIPPED | OUTPATIENT
Start: 2024-08-21

## 2024-08-21 NOTE — TELEPHONE ENCOUNTER
Caller: [facility] [place] - [place], [place] - [address] [facility] - [phone] [facility] [phone] FX DEBORAH FROM [facility]    Relationship: Pharmacy    Best call back number: [phone]     Requested Prescriptions:   Requested Prescriptions     Pending Prescriptions Disp Refills    aspirin (Aspirin Low Dose) 81 MG EC tablet 90 tablet 11    atorvastatin (LIPITOR) 10 MG tablet 90 tablet 1    losartan (COZAAR) 100 MG tablet 90 tablet 1     Sig: Take 1 tablet by mouth Daily.    hydroCHLOROthiazide 25 MG tablet 90 tablet 0     Sig: Take 1 tablet by mouth Daily.    megestrol (MEGACE) 20 MG tablet 30 tablet 1     Sig: Take 1 tablet by mouth Daily.    omeprazole (priLOSEC) 20 MG capsule 180 capsule 1     Sig: Take 1 capsule by mouth 2 (Two) Times a Day.    ondansetron ODT (ZOFRAN-ODT) 4 MG disintegrating tablet 30 tablet 1        Pharmacy where request should be sent: [facility] [place] - [place], [place] - [address] [facility] - [phone] [facility] [place] [phone] FX     Last office visit with prescribing clinician: 7/19/2024   Last telemedicine visit with prescribing clinician: Visit date not found   Next office visit with prescribing clinician: 10/22/2024     Additional details provided by patient: CALLER STATES PATIENT IS OUT OF REFILLS.    Does the patient have less than a 3 day supply:  [] Yes  [] No    Would you like a call back once the refill request has been completed: [] Yes [] No    If the office needs to give you a call back, can they leave a voicemail: [] Yes [] No    Tiffanie Gómez Rep   08/21/24 09:48 EDT

## 2024-09-03 DIAGNOSIS — F17.210 CIGARETTE NICOTINE DEPENDENCE WITHOUT COMPLICATION: ICD-10-CM

## 2024-09-03 RX ORDER — BUDESONIDE, GLYCOPYRROLATE, AND FORMOTEROL FUMARATE 160; 9; 4.8 UG/1; UG/1; UG/1
2 AEROSOL, METERED RESPIRATORY (INHALATION) 2 TIMES DAILY
Qty: 32.1 G | Refills: 1 | Status: SHIPPED | OUTPATIENT
Start: 2024-09-03

## 2024-09-03 RX ORDER — MEGESTROL ACETATE 20 MG/1
20 TABLET ORAL DAILY
Qty: 90 TABLET | Refills: 1 | Status: SHIPPED | OUTPATIENT
Start: 2024-09-03

## 2024-09-05 DIAGNOSIS — K21.9 GASTROESOPHAGEAL REFLUX DISEASE, UNSPECIFIED WHETHER ESOPHAGITIS PRESENT: ICD-10-CM

## 2024-09-05 NOTE — TELEPHONE ENCOUNTER
Caller: [facility]    Relationship: [facility]    Best call back number: [phone]     Requested Prescriptions:   Requested Prescriptions     Pending Prescriptions Disp Refills    omeprazole (priLOSEC) 20 MG capsule 180 capsule 1     Sig: Take 1 capsule by mouth 2 (Two) Times a Day.      Pharmacy where request should be sent: [facility] ([place]) - [place], [place] - [address] [facility] - [phone]  - [phone] FX     Last office visit with prescribing clinician: 7/19/2024   Last telemedicine visit with prescribing clinician: Visit date not found   Next office visit with prescribing clinician: 10/22/2024     Does the patient have less than a 3 day supply:  [] Yes  [x] No    Would you like a call back once the refill request has been completed: [] Yes [] No    If the office needs to give you a call back, can they leave a voicemail: [] Yes [] No    Tiffanie Holder Rep   09/05/24 09:25 EDT

## 2024-10-04 DIAGNOSIS — I10 ESSENTIAL HYPERTENSION: ICD-10-CM

## 2024-10-04 RX ORDER — METHOCARBAMOL 750 MG/1
750 TABLET, FILM COATED ORAL 3 TIMES DAILY
Qty: 90 TABLET | Refills: 1 | Status: SHIPPED | OUTPATIENT
Start: 2024-10-04

## 2024-10-04 RX ORDER — HYDROCHLOROTHIAZIDE 25 MG/1
25 TABLET ORAL DAILY
Qty: 90 TABLET | Refills: 0 | Status: SHIPPED | OUTPATIENT
Start: 2024-10-04

## 2024-10-21 NOTE — PROGRESS NOTES
Chief Complaint  Hypertension, Hyperlipidemia, and Medication Problem (Patient is requesting higher dose of retin A cream )    Subjective          Cindy Bella presents to [facility]  History of Present Illness      Hyperlipidemia  This is a chronic problem. The problem is controlled. She has no history of chronic renal disease, diabetes, hypothyroidism, liver disease, obesity or nephrotic syndrome. Pertinent negatives include no chest pain, focal sensory loss, focal weakness, leg pain, myalgias or shortness of breath. Compliance problems include adherence to diet and adherence to exercise.      Hypertension  This is a chronic problem. Associated symptoms include anxiety. Pertinent negatives include no blurred vision, chest pain, headaches, malaise/fatigue, neck pain, orthopnea, palpitations, peripheral edema, PND, shortness of breath or sweats. Compliance problems include diet and exercise.  There is no history of angina, kidney disease or CAD/MI. There is no history of chronic renal disease.     Has not been taking blood pressure at home.     Anxiety  Symptoms include irritability. Patient reports no chest pain, decreased concentration, insomnia, palpitations or shortness of breath.     Her past medical history is significant for depression.   DepressionPatient presents with the following symptoms: irritability.  Patient is not experiencing: decreased concentration, insomnia, palpitations, shortness of breath and weight loss.      Diarrhea   This is a recurrent problem. The stool consistency is described as Watery. Pertinent negatives include no abdominal pain, arthralgias, bloating, chills, coughing, fever, headaches, increased  flatus, myalgias, sweats, URI, vomiting or weight loss. Treatments tried: dicyclomine. caused constipation - has not been taking it.   COPD  There is no chest tightness, cough, difficulty breathing, frequent throat clearing, hemoptysis  "or shortness of breath. Pertinent negatives include no chest pain, fever, headaches, malaise/fatigue, myalgias, PND, sore throat, sweats or weight loss.   Heartburn  She reports no abdominal pain, no chest pain, no coughing or no sore throat. Pertinent negatives include no fatigue or weight loss.     Current Outpatient Medications   Medication Instructions    albuterol (ACCUNEB) 0.63 mg, Nebulization, Every 6 Hours PRN    albuterol sulfate  (90 Base) MCG/ACT inhaler INHALE TWO PUFFS BY MOUTH EVERY 4 HOURS AS NEEDED FOR WHEEZING (BULK)    aspirin (ASPIRIN LOW DOSE) 81 mg, Oral, Daily    atorvastatin (LIPITOR) 10 mg, Oral, Daily    Budeson-Glycopyrrol-Formoterol (Breztri Aerosphere) 160-9-4.8 MCG/ACT aerosol inhaler 2 puffs, Inhalation, 2 Times Daily    busPIRone (BUSPAR) 15 mg, Oral, 3 Times Daily    citalopram (CELEXA) 40 mg, Oral, Daily    hydroCHLOROthiazide 25 mg, Oral, Daily    ipratropium-albuterol (DUO-NEB) 0.5-2.5 mg/3 ml nebulizer 3 mL, Nebulization, 3 Times Daily PRN    losartan (COZAAR) 100 mg, Oral, Daily    megestrol (MEGACE) 20 mg, Oral, Daily    methocarbamol (ROBAXIN) 750 mg, Oral, 3 Times Daily    omeprazole (PRILOSEC) 20 mg, Oral, 2 Times Daily    propranolol (INDERAL) 80 MG tablet TAKE ONE TABLET BY MOUTH TWICE DAILY @9AM-5PM    traZODone (DESYREL) 100 mg, Oral, Every Night at Bedtime    tretinoin (Retin-A) 0.1 % cream 1 Application, Topical, Nightly       The following portions of the patient's history were reviewed and updated as appropriate: allergies, current medications, past family history, past medical history, past social history, past surgical history, and problem list.    Objective   Vital Signs:   /69 (BP Location: Left arm, Patient Position: Sitting, Cuff Size: Small Adult)   Pulse 67   Temp 98.7 °F (37.1 °C) (Temporal)   Ht 160 cm (63\")   Wt 43.4 kg (95 lb 9.6 oz)   SpO2 94%   BMI 16.93 kg/m²     BP Readings from Last 3 Encounters:   10/22/24 112/69   07/19/24 155/89 "   07/08/24 98/59     Wt Readings from Last 3 Encounters:   10/22/24 43.4 kg (95 lb 9.6 oz)   07/19/24 45 kg (99 lb 4 oz)   07/08/24 44 kg (96 lb 14.4 oz)           Physical Exam     Appearance: No acute distress, well-nourished  Head: normocephalic, atraumatic  Eyes: extraocular movements intact, no scleral icterus, no conjunctival injection  Ears, Nose, and Throat: external ears normal, nares patent, moist mucous membranes  Cardiovascular: regular rate and rhythm. no murmurs, rubs, or gallops. no edema  Respiratory: breathing comfortably, symmetric chest rise, clear to auscultation bilaterally. No wheezes, rales, or rhonchi.  Neuro: alert and oriented to time, place, and person. Normal gait  Psych: normal mood and affect     Result Review :   The following data was reviewed by: BROOKLYN Guillaume on 10/22/2024:  Common labs          1/3/2024    12:54 4/23/2024    09:26   Common Labs   Glucose 85  85    BUN 10  9    Creatinine 0.60  0.74    Sodium 139  140    Potassium 3.8  4.2    Chloride 100  103    Calcium 10.4  10.6    Albumin 4.5  4.2    Total Bilirubin 0.4  0.6    Alkaline Phosphatase 70  75    AST (SGOT) 20  23    ALT (SGPT) 14  16    WBC 9.24  9.40    Hemoglobin 14.0  15.0    Hematocrit 42.6  44.5    Platelets 310  269    Total Cholesterol 153  170    Triglycerides 103  138    HDL Cholesterol 57  49    LDL Cholesterol  77  97             Lab Results   Component Value Date    SARSANTIGEN Not Detected 01/24/2023    COVID19 NOT DETECTED 10/30/2020    FLUAAG Not Detected 01/24/2023    FLUBAG Not Detected 01/24/2023            Assessment and Plan    Diagnoses and all orders for this visit:    1. Essential hypertension (Primary)  Assessment & Plan:  Hypertension is controlled  Continue current treatment regimen.  Stop smoking.  Ambulatory blood pressure monitoring.  Blood pressure will be reassessedin 4 weeks.        Orders:  -     CBC w AUTO Differential  -     Comprehensive metabolic panel  -     Lipid  panel    2. Mixed hyperlipidemia  Assessment & Plan:   Lipid abnormalities are  will recheck lipids today    Plan:  Continue same medication/s without change.      Discussed medication dosage, use, side effects, and goals of treatment in detail.    Counseled patient on lifestyle modifications to help control hyperlipidemia.   Cholesterol lowering dietary information shared with patient.    Patient Treatment Goals:   LDL goal is less than 70    Followup in 3 months.    Orders:  -     Lipid panel    3. Pulmonary emphysema, unspecified emphysema type    4. Insomnia, unspecified type    5. Screening for thyroid disorder  -     TSH Rfx On Abnormal To Free T4    6. Nicotine dependence with nicotine-induced disorder, unspecified nicotine product type    7. Encounter for immunization  -     Fluzone >6mos    8. Age-related facial wrinkles  -     tretinoin (Retin-A) 0.1 % cream; Apply 1 Application topically to the appropriate area as directed Every Night.  Dispense: 45 g; Refill: 2    9. Chest pain, unspecified type  -     Ambulatory Referral to Cardiology    10. Shortness of breath  -     Ambulatory Referral to Cardiology  -     6 Minute Walk Test; Future    11. Anxiety  -     citalopram (CeleXA) 40 MG tablet; Take 1 tablet by mouth Daily.  Dispense: 90 tablet; Refill: 1          Medications Discontinued During This Encounter   Medication Reason    tretinoin (RETIN-A) 0.01 % gel     amLODIPine (NORVASC) 5 MG tablet Historical Med - Therapy completed    nicotine polacrilex (NICORETTE) 2 MG gum Historical Med - Therapy completed    ondansetron ODT (ZOFRAN-ODT) 4 MG disintegrating tablet Historical Med - Therapy completed    nicotine (NICODERM CQ) 21 MG/24HR patch Historical Med - Therapy completed    busPIRone (BUSPAR) 10 MG tablet Historical Med - Therapy completed    citalopram (CeleXA) 40 MG tablet Reorder          Follow Up   No follow-ups on file.  Patient was given instructions and counseling regarding her condition or  for health maintenance advice. Please see specific information pulled into the AVS if appropriate.       BROOKLYN Guillaume  10/30/24  10:32 EDT

## 2024-10-22 ENCOUNTER — OFFICE VISIT (OUTPATIENT)
Dept: INTERNAL MEDICINE | Facility: CLINIC | Age: 64
End: 2024-10-22
Payer: MEDICARE

## 2024-10-22 VITALS
OXYGEN SATURATION: 94 % | BODY MASS INDEX: 16.94 KG/M2 | SYSTOLIC BLOOD PRESSURE: 112 MMHG | DIASTOLIC BLOOD PRESSURE: 69 MMHG | TEMPERATURE: 98.7 F | HEART RATE: 67 BPM | WEIGHT: 95.6 LBS | HEIGHT: 63 IN

## 2024-10-22 DIAGNOSIS — L98.8 AGE-RELATED FACIAL WRINKLES: ICD-10-CM

## 2024-10-22 DIAGNOSIS — Z23 ENCOUNTER FOR IMMUNIZATION: ICD-10-CM

## 2024-10-22 DIAGNOSIS — E78.2 MIXED HYPERLIPIDEMIA: ICD-10-CM

## 2024-10-22 DIAGNOSIS — J43.9 PULMONARY EMPHYSEMA, UNSPECIFIED EMPHYSEMA TYPE: ICD-10-CM

## 2024-10-22 DIAGNOSIS — I10 ESSENTIAL HYPERTENSION: Primary | ICD-10-CM

## 2024-10-22 DIAGNOSIS — R06.02 SHORTNESS OF BREATH: ICD-10-CM

## 2024-10-22 DIAGNOSIS — F41.9 ANXIETY: ICD-10-CM

## 2024-10-22 DIAGNOSIS — F17.209 NICOTINE DEPENDENCE WITH NICOTINE-INDUCED DISORDER, UNSPECIFIED NICOTINE PRODUCT TYPE: ICD-10-CM

## 2024-10-22 DIAGNOSIS — R07.9 CHEST PAIN, UNSPECIFIED TYPE: ICD-10-CM

## 2024-10-22 DIAGNOSIS — G47.00 INSOMNIA, UNSPECIFIED TYPE: ICD-10-CM

## 2024-10-22 DIAGNOSIS — Z13.29 SCREENING FOR THYROID DISORDER: ICD-10-CM

## 2024-10-22 PROCEDURE — 3078F DIAST BP <80 MM HG: CPT | Performed by: NURSE PRACTITIONER

## 2024-10-22 PROCEDURE — 90656 IIV3 VACC NO PRSV 0.5 ML IM: CPT | Performed by: NURSE PRACTITIONER

## 2024-10-22 PROCEDURE — G0008 ADMIN INFLUENZA VIRUS VAC: HCPCS | Performed by: NURSE PRACTITIONER

## 2024-10-22 PROCEDURE — 3074F SYST BP LT 130 MM HG: CPT | Performed by: NURSE PRACTITIONER

## 2024-10-22 PROCEDURE — 1125F AMNT PAIN NOTED PAIN PRSNT: CPT | Performed by: NURSE PRACTITIONER

## 2024-10-22 PROCEDURE — 99214 OFFICE O/P EST MOD 30 MIN: CPT | Performed by: NURSE PRACTITIONER

## 2024-10-22 RX ORDER — TRETINOIN 1 MG/G
1 CREAM TOPICAL NIGHTLY
Qty: 45 G | Refills: 2 | Status: SHIPPED | OUTPATIENT
Start: 2024-10-22

## 2024-10-23 ENCOUNTER — TELEPHONE (OUTPATIENT)
Dept: INTERNAL MEDICINE | Facility: CLINIC | Age: 64
End: 2024-10-23
Payer: MEDICARE

## 2024-10-23 DIAGNOSIS — F41.1 GAD (GENERALIZED ANXIETY DISORDER): Chronic | ICD-10-CM

## 2024-10-23 NOTE — TELEPHONE ENCOUNTER
"Caller: Cindy Bella \"Bridgette\"    Relationship: Self    Best call back number:     [phone]     What test was performed: WALK/OXYGEN TEST    When was the test performed: 10.22.24    Where was the test performed: IN OFFICE    Additional notes:     "

## 2024-10-24 NOTE — TELEPHONE ENCOUNTER
Spoke with patient. Transferred from front office.   She reports the MA who did the walk test had a sticky note and was writing stuff down. She reports that her oxygen was as low as 83% and stayed that way for a while.   Patient is aware I was out of office and I am unsure where the MA who covered placed the documentation.     Patient is wanting to know results so she is able to get oxygen if needed.

## 2024-10-28 DIAGNOSIS — I10 ESSENTIAL HYPERTENSION: ICD-10-CM

## 2024-10-28 RX ORDER — HYDROCHLOROTHIAZIDE 25 MG/1
25 TABLET ORAL DAILY
Qty: 90 TABLET | Refills: 0 | Status: SHIPPED | OUTPATIENT
Start: 2024-10-28

## 2024-10-30 ENCOUNTER — TELEPHONE (OUTPATIENT)
Dept: INTERNAL MEDICINE | Facility: CLINIC | Age: 64
End: 2024-10-30

## 2024-10-30 RX ORDER — CITALOPRAM HYDROBROMIDE 40 MG/1
40 TABLET ORAL DAILY
Qty: 90 TABLET | Refills: 1 | Status: SHIPPED | OUTPATIENT
Start: 2024-10-30

## 2024-10-30 NOTE — TELEPHONE ENCOUNTER
"    Caller: Cindy Bella \"Bridgette\"    Relationship: Self    Best call back number: [phone]    Caller requesting test results: SELF    What test was performed: 6 MIN WALK TEST         "

## 2024-10-30 NOTE — ASSESSMENT & PLAN NOTE
Hypertension is controlled  Continue current treatment regimen.  Stop smoking.  Ambulatory blood pressure monitoring.  Blood pressure will be reassessedin 4 weeks.

## 2024-10-31 ENCOUNTER — TELEPHONE (OUTPATIENT)
Dept: INTERNAL MEDICINE | Facility: CLINIC | Age: 64
End: 2024-10-31
Payer: MEDICARE

## 2024-10-31 NOTE — TELEPHONE ENCOUNTER
Pt and daughter called on speaker.  Showed up for scheduled appt to be told it was cancelled & needed to be scheduled at the hosp.  There is a question about which test needs to be scheduled.  Alivia & MA to look over the pt's info.  Also pt is concerned about her 6 min walk results.  Pt has been disconnected by the time I returned to the phone with an update.  Attempted to call no ans/VM not set up.  Clinical staff to follow up & contact patient.

## 2024-10-31 NOTE — TELEPHONE ENCOUNTER
"  Caller: Cindy Bella \"Bridgette\"    Relationship: Self    Best call back number: [phone]    What was the call regarding: PATIENT IS SPEAKING WITH ALICIA AT OFFICE REGARDING A TEST SHE WAS SUPPOSED TO HAVE COMPLETED BUT WAS NOT ABLE TO, BUT THE PHONE CUT OFF AND SHE IS CALLING BACK TO CONTINUE CONVERSATION    "

## 2024-11-08 NOTE — TELEPHONE ENCOUNTER
"Attempted to contact patient. Could not leave message due to no voicemail.    Relay  HUB ok to read/advise  \"Oxygen [facility]. They will be reaching out to the patient.\"  "

## 2024-11-11 DIAGNOSIS — I10 ESSENTIAL HYPERTENSION: ICD-10-CM

## 2024-11-11 RX ORDER — HYDROCHLOROTHIAZIDE 25 MG/1
25 TABLET ORAL DAILY
Qty: 90 TABLET | Refills: 0 | Status: SHIPPED | OUTPATIENT
Start: 2024-11-11

## 2024-11-11 RX ORDER — BUSPIRONE HYDROCHLORIDE 15 MG/1
15 TABLET ORAL 3 TIMES DAILY
Qty: 90 TABLET | Refills: 1 | Status: SHIPPED | OUTPATIENT
Start: 2024-11-11

## 2024-11-11 NOTE — TELEPHONE ENCOUNTER
Spoke with patient. Verified .   Made aware of orders.   Patient said her buspar and hydrochlorothiazide was not sent to [facility] so I have resent them

## 2024-11-13 NOTE — TELEPHONE ENCOUNTER
Caller: [facility] ([place]) - [place], [place] - [address] - [phone] [facility] [phone] FX    Relationship: Pharmacy    Best call back number: [phone]    Requested Prescriptions:   Requested Prescriptions     Pending Prescriptions Disp Refills    albuterol sulfate  (90 Base) MCG/ACT inhaler 20.1 g 3        Pharmacy where request should be sent: [facility] ([place]) - [place], [place] - [address] [facility] - [phone] [facility] [phone] FX     Last office visit with prescribing clinician: 10/22/2024   Last telemedicine visit with prescribing clinician: Visit date not found   Next office visit with prescribing clinician: Visit date not found     Additional details provided by patient: PATIENT IS NEEDING REFILL AS 90 DAY SUPPLY AS THEY DID RECEIVE THE 30 DAY BUT IT IS REQUIRED THAT THEY RECEIVE 90 DAY INSTEAD.     Does the patient have less than a 3 day supply:  [x] Yes  [] No    Would you like a call back once the refill request has been completed: [] Yes [x] No    If the office needs to give you a call back, can they leave a voicemail: [] Yes [x] No    Tiffanie Boyd Rep   11/13/24 16:49 EST

## 2024-11-14 RX ORDER — ALBUTEROL SULFATE 90 UG/1
2 INHALANT RESPIRATORY (INHALATION) EVERY 4 HOURS PRN
Qty: 20.1 G | Refills: 3 | Status: SHIPPED | OUTPATIENT
Start: 2024-11-14

## 2024-11-18 DIAGNOSIS — L98.8 AGE-RELATED FACIAL WRINKLES: ICD-10-CM

## 2024-11-18 DIAGNOSIS — R03.0 ELEVATED BLOOD-PRESSURE READING, WITHOUT DIAGNOSIS OF HYPERTENSION: ICD-10-CM

## 2024-11-18 DIAGNOSIS — I10 ESSENTIAL HYPERTENSION: Primary | ICD-10-CM

## 2024-11-18 RX ORDER — TRETINOIN 1 MG/G
1 CREAM TOPICAL NIGHTLY
Qty: 45 G | Refills: 2 | Status: SHIPPED | OUTPATIENT
Start: 2024-11-18

## 2024-11-21 ENCOUNTER — PRIOR AUTHORIZATION (OUTPATIENT)
Dept: INTERNAL MEDICINE | Facility: CLINIC | Age: 64
End: 2024-11-21
Payer: MEDICARE

## 2024-11-29 ENCOUNTER — DOCUMENTATION (OUTPATIENT)
Dept: INTERNAL MEDICINE | Facility: CLINIC | Age: 64
End: 2024-11-29
Payer: MEDICARE

## 2024-11-29 ENCOUNTER — APPOINTMENT (OUTPATIENT)
Dept: GENERAL RADIOLOGY | Facility: HOSPITAL | Age: 64
End: 2024-11-29
Payer: MEDICARE

## 2024-11-29 ENCOUNTER — HOSPITAL ENCOUNTER (OUTPATIENT)
Facility: HOSPITAL | Age: 64
Setting detail: OBSERVATION
LOS: 1 days | Discharge: HOME | End: 2024-11-30
Attending: EMERGENCY MEDICINE | Admitting: INTERNAL MEDICINE
Payer: MEDICARE

## 2024-11-29 DIAGNOSIS — R09.02 HYPOXIA: ICD-10-CM

## 2024-11-29 DIAGNOSIS — J44.1 COPD EXACERBATION: Primary | ICD-10-CM

## 2024-11-29 PROBLEM — J96.01 ACUTE RESPIRATORY FAILURE WITH HYPOXIA: Status: ACTIVE | Noted: 2024-11-29

## 2024-11-29 LAB
ALBUMIN SERPL-MCNC: 4.4 G/DL (ref 3.5–5.2)
ALBUMIN/GLOB SERPL: 1.5 G/DL
ALP SERPL-CCNC: 86 U/L (ref 39–117)
ALT SERPL W P-5'-P-CCNC: 13 U/L (ref 1–33)
ANION GAP SERPL CALCULATED.3IONS-SCNC: 11 MMOL/L (ref 5–15)
AST SERPL-CCNC: 20 U/L (ref 1–32)
BASOPHILS # BLD AUTO: 0.04 10*3/MM3 (ref 0–0.2)
BASOPHILS NFR BLD AUTO: 0.4 % (ref 0–1.5)
BILIRUB SERPL-MCNC: 0.6 MG/DL (ref 0–1.2)
BUN SERPL-MCNC: 8 MG/DL (ref 8–23)
BUN/CREAT SERPL: 11 (ref 7–25)
CALCIUM SPEC-SCNC: 10.1 MG/DL (ref 8.6–10.5)
CHLORIDE SERPL-SCNC: 101 MMOL/L (ref 98–107)
CO2 SERPL-SCNC: 28 MMOL/L (ref 22–29)
CREAT SERPL-MCNC: 0.73 MG/DL (ref 0.57–1)
DEPRECATED RDW RBC AUTO: 45.6 FL (ref 37–54)
EGFRCR SERPLBLD CKD-EPI 2021: 92 ML/MIN/1.73
EOSINOPHIL # BLD AUTO: 0.14 10*3/MM3 (ref 0–0.4)
EOSINOPHIL NFR BLD AUTO: 1.5 % (ref 0.3–6.2)
ERYTHROCYTE [DISTWIDTH] IN BLOOD BY AUTOMATED COUNT: 13.4 % (ref 12.3–15.4)
GLOBULIN UR ELPH-MCNC: 3 GM/DL
GLUCOSE SERPL-MCNC: 102 MG/DL (ref 65–99)
HCT VFR BLD AUTO: 43.4 % (ref 34–46.6)
HGB BLD-MCNC: 14.5 G/DL (ref 12–15.9)
HOLD SPECIMEN: NORMAL
HOLD SPECIMEN: NORMAL
IMM GRANULOCYTES # BLD AUTO: 0.02 10*3/MM3 (ref 0–0.05)
IMM GRANULOCYTES NFR BLD AUTO: 0.2 % (ref 0–0.5)
LYMPHOCYTES # BLD AUTO: 2.31 10*3/MM3 (ref 0.7–3.1)
LYMPHOCYTES NFR BLD AUTO: 24.8 % (ref 19.6–45.3)
MCH RBC QN AUTO: 31.2 PG (ref 26.6–33)
MCHC RBC AUTO-ENTMCNC: 33.4 G/DL (ref 31.5–35.7)
MCV RBC AUTO: 93.3 FL (ref 79–97)
MONOCYTES # BLD AUTO: 0.82 10*3/MM3 (ref 0.1–0.9)
MONOCYTES NFR BLD AUTO: 8.8 % (ref 5–12)
NEUTROPHILS NFR BLD AUTO: 6 10*3/MM3 (ref 1.7–7)
NEUTROPHILS NFR BLD AUTO: 64.3 % (ref 42.7–76)
NRBC BLD AUTO-RTO: 0 /100 WBC (ref 0–0.2)
NT-PROBNP SERPL-MCNC: 137.6 PG/ML (ref 0–900)
PLATELET # BLD AUTO: 240 10*3/MM3 (ref 140–450)
PMV BLD AUTO: 9.4 FL (ref 6–12)
POTASSIUM SERPL-SCNC: 3.6 MMOL/L (ref 3.5–5.2)
PROT SERPL-MCNC: 7.4 G/DL (ref 6–8.5)
QT INTERVAL: 393 MS
QTC INTERVAL: 438 MS
RBC # BLD AUTO: 4.65 10*6/MM3 (ref 3.77–5.28)
SODIUM SERPL-SCNC: 140 MMOL/L (ref 136–145)
TROPONIN T SERPL HS-MCNC: 7 NG/L
WBC NRBC COR # BLD AUTO: 9.33 10*3/MM3 (ref 3.4–10.8)
WHOLE BLOOD HOLD COAG: NORMAL
WHOLE BLOOD HOLD SPECIMEN: NORMAL

## 2024-11-29 PROCEDURE — 99285 EMERGENCY DEPT VISIT HI MDM: CPT

## 2024-11-29 PROCEDURE — 94799 UNLISTED PULMONARY SVC/PX: CPT

## 2024-11-29 PROCEDURE — G0378 HOSPITAL OBSERVATION PER HR: HCPCS

## 2024-11-29 PROCEDURE — 96374 THER/PROPH/DIAG INJ IV PUSH: CPT

## 2024-11-29 PROCEDURE — 83880 ASSAY OF NATRIURETIC PEPTIDE: CPT | Performed by: EMERGENCY MEDICINE

## 2024-11-29 PROCEDURE — 94640 AIRWAY INHALATION TREATMENT: CPT

## 2024-11-29 PROCEDURE — 93005 ELECTROCARDIOGRAM TRACING: CPT | Performed by: EMERGENCY MEDICINE

## 2024-11-29 PROCEDURE — 93005 ELECTROCARDIOGRAM TRACING: CPT

## 2024-11-29 PROCEDURE — 25010000002 METHYLPREDNISOLONE PER 125 MG: Performed by: EMERGENCY MEDICINE

## 2024-11-29 PROCEDURE — 94664 DEMO&/EVAL PT USE INHALER: CPT

## 2024-11-29 PROCEDURE — 85025 COMPLETE CBC W/AUTO DIFF WBC: CPT

## 2024-11-29 PROCEDURE — 84484 ASSAY OF TROPONIN QUANT: CPT | Performed by: EMERGENCY MEDICINE

## 2024-11-29 PROCEDURE — 80053 COMPREHEN METABOLIC PANEL: CPT | Performed by: EMERGENCY MEDICINE

## 2024-11-29 PROCEDURE — 71045 X-RAY EXAM CHEST 1 VIEW: CPT

## 2024-11-29 RX ORDER — AMOXICILLIN 250 MG
2 CAPSULE ORAL 2 TIMES DAILY PRN
Status: DISCONTINUED | OUTPATIENT
Start: 2024-11-29 | End: 2024-11-30 | Stop reason: HOSPADM

## 2024-11-29 RX ORDER — METHYLPREDNISOLONE SODIUM SUCCINATE 40 MG/ML
40 INJECTION, POWDER, LYOPHILIZED, FOR SOLUTION INTRAMUSCULAR; INTRAVENOUS DAILY
Status: DISCONTINUED | OUTPATIENT
Start: 2024-11-30 | End: 2024-11-30 | Stop reason: HOSPADM

## 2024-11-29 RX ORDER — ALUMINA, MAGNESIA, AND SIMETHICONE 2400; 2400; 240 MG/30ML; MG/30ML; MG/30ML
15 SUSPENSION ORAL EVERY 6 HOURS PRN
Status: DISCONTINUED | OUTPATIENT
Start: 2024-11-29 | End: 2024-11-30 | Stop reason: HOSPADM

## 2024-11-29 RX ORDER — ENOXAPARIN SODIUM 100 MG/ML
30 INJECTION SUBCUTANEOUS DAILY
Status: DISCONTINUED | OUTPATIENT
Start: 2024-11-30 | End: 2024-11-30 | Stop reason: HOSPADM

## 2024-11-29 RX ORDER — SODIUM CHLORIDE 0.9 % (FLUSH) 0.9 %
10 SYRINGE (ML) INJECTION AS NEEDED
Status: DISCONTINUED | OUTPATIENT
Start: 2024-11-29 | End: 2024-11-30 | Stop reason: HOSPADM

## 2024-11-29 RX ORDER — IPRATROPIUM BROMIDE AND ALBUTEROL SULFATE 2.5; .5 MG/3ML; MG/3ML
3 SOLUTION RESPIRATORY (INHALATION) ONCE
Status: COMPLETED | OUTPATIENT
Start: 2024-11-29 | End: 2024-11-29

## 2024-11-29 RX ORDER — IPRATROPIUM BROMIDE AND ALBUTEROL SULFATE 2.5; .5 MG/3ML; MG/3ML
3 SOLUTION RESPIRATORY (INHALATION)
Status: DISCONTINUED | OUTPATIENT
Start: 2024-11-29 | End: 2024-11-30 | Stop reason: HOSPADM

## 2024-11-29 RX ORDER — BUSPIRONE HYDROCHLORIDE 15 MG/1
15 TABLET ORAL 3 TIMES DAILY
Status: DISCONTINUED | OUTPATIENT
Start: 2024-11-29 | End: 2024-11-30 | Stop reason: HOSPADM

## 2024-11-29 RX ORDER — BUSPIRONE HYDROCHLORIDE 10 MG/1
10 TABLET ORAL 2 TIMES DAILY
COMMUNITY
Start: 2024-11-25

## 2024-11-29 RX ORDER — METHOCARBAMOL 500 MG/1
750 TABLET, FILM COATED ORAL EVERY 8 HOURS PRN
Status: DISCONTINUED | OUTPATIENT
Start: 2024-11-29 | End: 2024-11-30 | Stop reason: HOSPADM

## 2024-11-29 RX ORDER — POLYETHYLENE GLYCOL 3350 17 G/17G
17 POWDER, FOR SOLUTION ORAL DAILY PRN
Status: DISCONTINUED | OUTPATIENT
Start: 2024-11-29 | End: 2024-11-30 | Stop reason: HOSPADM

## 2024-11-29 RX ORDER — ATORVASTATIN CALCIUM 10 MG/1
10 TABLET, FILM COATED ORAL DAILY
Status: DISCONTINUED | OUTPATIENT
Start: 2024-11-30 | End: 2024-11-30 | Stop reason: HOSPADM

## 2024-11-29 RX ORDER — METHYLPREDNISOLONE SODIUM SUCCINATE 125 MG/2ML
125 INJECTION, POWDER, LYOPHILIZED, FOR SOLUTION INTRAMUSCULAR; INTRAVENOUS ONCE
Status: COMPLETED | OUTPATIENT
Start: 2024-11-29 | End: 2024-11-29

## 2024-11-29 RX ORDER — NICOTINE 21 MG/24HR
1 PATCH, TRANSDERMAL 24 HOURS TRANSDERMAL
Status: DISCONTINUED | OUTPATIENT
Start: 2024-11-29 | End: 2024-11-30 | Stop reason: HOSPADM

## 2024-11-29 RX ORDER — BISACODYL 10 MG
10 SUPPOSITORY, RECTAL RECTAL DAILY PRN
Status: DISCONTINUED | OUTPATIENT
Start: 2024-11-29 | End: 2024-11-30 | Stop reason: HOSPADM

## 2024-11-29 RX ORDER — NITROGLYCERIN 0.4 MG/1
0.4 TABLET SUBLINGUAL
Status: DISCONTINUED | OUTPATIENT
Start: 2024-11-29 | End: 2024-11-30 | Stop reason: HOSPADM

## 2024-11-29 RX ORDER — BISACODYL 5 MG/1
5 TABLET, DELAYED RELEASE ORAL DAILY PRN
Status: DISCONTINUED | OUTPATIENT
Start: 2024-11-29 | End: 2024-11-30 | Stop reason: HOSPADM

## 2024-11-29 RX ORDER — SODIUM CHLORIDE 0.9 % (FLUSH) 0.9 %
10 SYRINGE (ML) INJECTION EVERY 12 HOURS SCHEDULED
Status: DISCONTINUED | OUTPATIENT
Start: 2024-11-29 | End: 2024-11-30 | Stop reason: HOSPADM

## 2024-11-29 RX ORDER — AMLODIPINE BESYLATE 2.5 MG/1
2.5 TABLET ORAL DAILY
COMMUNITY
Start: 2024-11-25

## 2024-11-29 RX ORDER — PROPRANOLOL HYDROCHLORIDE 40 MG/1
80 TABLET ORAL EVERY 12 HOURS SCHEDULED
Status: DISCONTINUED | OUTPATIENT
Start: 2024-11-29 | End: 2024-11-30 | Stop reason: HOSPADM

## 2024-11-29 RX ORDER — TRAZODONE HYDROCHLORIDE 100 MG/1
100 TABLET ORAL NIGHTLY
Status: DISCONTINUED | OUTPATIENT
Start: 2024-11-29 | End: 2024-11-30 | Stop reason: HOSPADM

## 2024-11-29 RX ORDER — CITALOPRAM HYDROBROMIDE 20 MG/1
40 TABLET ORAL DAILY
Status: DISCONTINUED | OUTPATIENT
Start: 2024-11-29 | End: 2024-11-30 | Stop reason: HOSPADM

## 2024-11-29 RX ORDER — SODIUM CHLORIDE 9 MG/ML
40 INJECTION, SOLUTION INTRAVENOUS AS NEEDED
Status: DISCONTINUED | OUTPATIENT
Start: 2024-11-29 | End: 2024-11-30 | Stop reason: HOSPADM

## 2024-11-29 RX ORDER — ACETAMINOPHEN 325 MG/1
650 TABLET ORAL EVERY 6 HOURS PRN
Status: DISCONTINUED | OUTPATIENT
Start: 2024-11-29 | End: 2024-11-30 | Stop reason: HOSPADM

## 2024-11-29 RX ADMIN — IPRATROPIUM BROMIDE AND ALBUTEROL SULFATE 3 ML: .5; 3 SOLUTION RESPIRATORY (INHALATION) at 15:18

## 2024-11-29 RX ADMIN — METHYLPREDNISOLONE SODIUM SUCCINATE 125 MG: 125 INJECTION, POWDER, FOR SOLUTION INTRAMUSCULAR; INTRAVENOUS at 15:27

## 2024-11-29 RX ADMIN — IPRATROPIUM BROMIDE AND ALBUTEROL SULFATE 3 ML: .5; 3 SOLUTION RESPIRATORY (INHALATION) at 20:22

## 2024-11-29 RX ADMIN — Medication 10 ML: at 22:01

## 2024-11-29 RX ADMIN — NICOTINE 1 PATCH: 21 PATCH, EXTENDED RELEASE TRANSDERMAL at 22:16

## 2024-11-29 RX ADMIN — BUSPIRONE HYDROCHLORIDE 15 MG: 15 TABLET ORAL at 22:01

## 2024-11-29 RX ADMIN — IPRATROPIUM BROMIDE AND ALBUTEROL SULFATE 3 ML: .5; 3 SOLUTION RESPIRATORY (INHALATION) at 23:23

## 2024-11-29 RX ADMIN — CITALOPRAM HYDROBROMIDE 40 MG: 20 TABLET ORAL at 22:17

## 2024-11-29 RX ADMIN — PROPRANOLOL HYDROCHLORIDE 80 MG: 40 TABLET ORAL at 22:01

## 2024-11-29 RX ADMIN — TRAZODONE HYDROCHLORIDE 100 MG: 100 TABLET ORAL at 22:01

## 2024-11-29 RX ADMIN — METHOCARBAMOL 750 MG: 500 TABLET ORAL at 22:17

## 2024-11-29 RX ADMIN — ACETAMINOPHEN 650 MG: 325 TABLET ORAL at 22:17

## 2024-11-29 NOTE — PROGRESS NOTES
Patient called the after-hours call center regarding issue with oxygen home delivery from [facility]. Provider returned call to patient who stated that home oxygen was ordered approximately 2 weeks ago at visit with PCP. She reports that she was notified by [facility] that there was a problem with the prescription. Patient reports that her oxygen is dropping to 82% when she is walking. This returns to 90 with rest. Patient reports that she is not in any distress at this time but does not like to sit around. Instructed the patient that if she is feeling more short of breath or if her oxygen is dropping below 90, she should be evaluated in the ER over the holiday weekend. Discussed need to contact her PCP office first thing Monday morning to get oxygen prescription addressed. Patient verbalized understanding.

## 2024-11-29 NOTE — H&P
[facility]   HOSPITALIST HISTORY AND PHYSICAL  Date: 2024   Patient Name: Cindy Bella  : 1960  MRN: [id number]  Primary Care Physician:  Alivia Burgess APRN  Date of admission: 2024    Subjective   Subjective     Chief Complaint: Low oxygen    HPI:    Cindy Bella is a 63 y.o. female past medical history of hypertension, hyperlipidemia, SVT status post ablation, scoliosis, COPD not on home oxygen, anxiety/depression who presents to the ER due to low oxygen level.  Patient has been working with pulmonology in the outpatient due to worsening exertional dyspnea for the last few weeks.  She is apparently having some oxygen arranged but CBC she has not been able to get it at home.  In that time she is also aggressive worsening exertional dyspnea and persistent hypoxia with vascular surgery saturations in the 80s at home.  Today her dyspnea was particularly worse and her daughter checked her pulse ox and it found to be in the 60s and 70s at which point they called EMS and had her brought to the ER for evaluation    Upon arrival patient has been hypoxic to the low 80s on room air requiring 2 L nasal cannula with improvement.  Lab workup is unremarkable including a negative troponin and BNP.  Chest x-ray is negative.  Patient was placed on oxygen and found to be wheezing on exam at which point she was started on steroids and DuoNebs for COPD exacerbation.  Hospitalist then contacted for admission.  Patient denies having any chest pain.  Denies any worsening in her productive cough but continues to smoke 1 or 2 cigarettes a day.  She also denies any fevers or chills.  No sick contacts.  No lower extremity edema.      Personal History     Past Medical History:  Past Medical History:   Diagnosis Date    Anxiety     Arthritis     Brain neoplasm malignant     COPD (chronic obstructive pulmonary disease)     Depression     Fibromyalgia, primary     HL (hearing loss)     Hyperlipidemia      Hypertension     Irritable bowel syndrome     Low back pain     Scoliosis     SVT (supraventricular tachycardia)     Urethral stricture 2015         Past Surgical History:  Past Surgical History:   Procedure Laterality Date    APPENDECTOMY      BREAST SURGERY      CARDIAC SURGERY       SECTION      COLONOSCOPY      HYSTERECTOMY      PARTIAL    OOPHORECTOMY Right     UPPER GASTROINTESTINAL ENDOSCOPY           Family History:   Reviewed and noncontributory except as mentioned in HPI    Social History:   Social Drivers of Health     Tobacco Use: High Risk (10/22/2024)    Patient History     Smoking Tobacco Use: Every Day     Smokeless Tobacco Use: Never     Passive Exposure: Not on file   Alcohol Use: Not At Risk (2020)    Received from [facility]    AUDIT-C     Frequency of Alcohol Consumption: Never     Average Number of Drinks: Not on file     Frequency of Binge Drinking: Not on file   Financial Resource Strain: Not on file   Food Insecurity: Not on file   Transportation Needs: Not on file   Physical Activity: Not on file   Stress: Not on file   Social Connections: Not on file   Interpersonal Safety: Not At Risk (2024)    Abuse Screen     Unsafe at Home or Work/School: no     Feels Threatened by Someone?: no     Does Anyone Keep You from Contacting Others or Doint Things Outside the Home?: no     Physical Sign of Abuse Present: no   Depression: At risk (10/22/2024)    PHQ-2     PHQ-2 Score: 14   Housing Stability: Not on file   Utilities: Not on file   Health Literacy: Not on file   Employment: Not on file   Disabilities: Not on file         Home Medications:  Budeson-Glycopyrrol-Formoterol, albuterol, albuterol sulfate HFA, aspirin, atorvastatin, busPIRone, citalopram, hydroCHLOROthiazide, ipratropium-albuterol, losartan, megestrol, methocarbamol, omeprazole, propranolol, traZODone, and tretinoin    Allergies:  Allergies   Allergen Reactions    Sumatriptan  Shortness Of Breath    Latex Itching    Levofloxacin Nausea Only    Nsaids Itching    Amoxicillin-Pot Clavulanate Rash    Pseudoephedrine Rash       Review of Systems   All systems were reviewed and negative except for: Shortness of breath, cough    Objective   Objective     Vitals:   Temp:  [98.3 °F (36.8 °C)] 98.3 °F (36.8 °C)  Heart Rate:  [54-87] 75  Resp:  [19-20] 19  BP: (135)/(81) 135/81  Flow (L/min) (Oxygen Therapy):  [2] 2    Physical Exam    Constitutional: Awake, alert, no acute distress   Eyes: Pupils equal, sclerae anicteric, no conjunctival injection   HENT: NCAT, mucous membranes moist   Neck: Supple, no thyromegaly, no lymphadenopathy, trachea midline   Respiratory: Scattered wheezing bilaterally, nonlabored respirations    Cardiovascular: RRR, no murmurs, rubs, or gallops, palpable pedal pulses bilaterally   Gastrointestinal: Positive bowel sounds, soft, nontender, nondistended   Musculoskeletal: No bilateral ankle edema, no clubbing or cyanosis to extremities   Psychiatric: Appropriate affect, cooperative   Neurologic: Oriented x 3, strength symmetric in all extremities, Cranial Nerves grossly intact to confrontation, speech clear   Skin: No rashes     Result Review    Result Review:  I have personally reviewed the results from the time of this admission to 11/29/2024 17:40 EST and agree with these findings:  [x]  Laboratory  [x]  Microbiology  [x]  Radiology  [x]  EKG/Telemetry   [x]  Cardiology/Vascular   []  Pathology  [x]  Old records  []  Other:      Assessment & Plan   Assessment / Plan     Assessment/Plan:   Acute hypoxic respiratory failure likely secondary to COPD exacerbation  COPD exacerbation  Hypertension  Hyperlipidemia  History of brain tumor?  Scoliosis  Anxiety/depression  Paroxysmal SVT status post ablation    Plan  - Admit to hospitalist service  - Continue oxygen titrated SO2 88 to 92%.  Steroids and scheduled nebulizers ordered.  No indication for antibiotics due to lack of  productive cough.  Case management consult to arrange home oxygen which seems to be required based on outpatient testing  - Continue home Antivert tensive  - Continue home statin  - Unclear what patient's brain tumor was but she states she follows with surgeon as outpatient and they have not required surgery previously  - Continue home beta-blocker for SVT once we have confirmed dosing      Discussed with ER Physician and Nurse    All labs/imaging studies were personally reviewed and findings are as noted above      DVT Prophylaxis: Lovenox    CODE STATUS:    Code Status (Patient has no pulse and is not breathing): CPR (Attempt to Resuscitate)  Medical Interventions (Patient has pulse or is breathing): Full Support      Admission Status:  I believe this patient meets observation status.    Electronically signed by Ori Li MD, 11/29/24, 5:40 PM EST.

## 2024-11-29 NOTE — ED PROVIDER NOTES
Time: 2:59 PM EST  Date of encounter:  2024  Independent Historian/Clinical History and Information was obtained by:   Patient    History is limited by: N/A    Chief Complaint: Shortness of air      History of Present Illness:  Patient is a 63 y.o. year old female who presents to the emergency department for evaluation of shortness of air, low oxygen levels at home.  Patient has COPD and was recently approved for home oxygen approximately 3 weeks ago but due to insurance/paperwork issues has not received it.  She presents today due to increasing shortness of breath with exertion.  She does have some right sided lateral chest pain that is near her shoulder and radiates into her back.  She states this may be her scoliosis and not true chest pain.  Afebrile.        Patient Care Team  Primary Care Provider: Alivia Burgess APRN    Past Medical History:     Allergies   Allergen Reactions    Sumatriptan Shortness Of Breath    Latex Itching    Levofloxacin Nausea Only    Nsaids Itching    Amoxicillin-Pot Clavulanate Rash    Pseudoephedrine Rash     Past Medical History:   Diagnosis Date    Anxiety     Arthritis     Brain neoplasm malignant     COPD (chronic obstructive pulmonary disease)     Depression     Fibromyalgia, primary     HL (hearing loss)     Hyperlipidemia     Hypertension     Irritable bowel syndrome     Low back pain     Scoliosis     SVT (supraventricular tachycardia)     Urethral stricture 2015     Past Surgical History:   Procedure Laterality Date    APPENDECTOMY      BREAST SURGERY      CARDIAC SURGERY       SECTION      COLONOSCOPY      HYSTERECTOMY      PARTIAL    OOPHORECTOMY Right     UPPER GASTROINTESTINAL ENDOSCOPY       Family History   Problem Relation Age of Onset    Hypertension Mother     Stroke Mother     Kidney disease Mother     Hypertension Sister     Stroke Sister     Hypertension Brother     Hypertension Maternal Grandmother     Hypertension Maternal Grandfather      Hypertension Paternal Grandmother         Cerebral hemorrhage    Breast cancer Other     Pancreatic cancer Other     Thyroid cancer Other         Gland    Hyperlipidemia Other     Cancer Other     Coronary artery disease Other     Hypertension Other     Stroke Other     Hypertension Sister     Other Sister         Breast Cancer    Hypertension Sister     Stroke Sister     Hypertension Brother     Other Maternal Aunt     Other Sister        Home Medications:  Prior to Admission medications    Medication Sig Start Date End Date Taking? Authorizing Provider   albuterol (ACCUNEB) 0.63 MG/3ML nebulizer solution Take 3 mL by nebulization Every 6 (Six) Hours As Needed for Wheezing. 4/12/23   Jose Armijo MD   albuterol sulfate  (90 Base) MCG/ACT inhaler Inhale 2 puffs Every 4 (Four) Hours As Needed for Wheezing. 11/14/24   Alivia Burgess APRN   aspirin (Aspirin Low Dose) 81 MG EC tablet Take 1 tablet by mouth Daily. 8/21/24   Alivia Burgess APRN   atorvastatin (LIPITOR) 10 MG tablet Take 1 tablet by mouth Daily. 8/21/24   Alivia Burgess APRN   Budeson-Glycopyrrol-Formoterol (Breztri Aerosphere) 160-9-4.8 MCG/ACT aerosol inhaler INHALE TWO PUFFS BY MOUTH TWICE DAILY 9/3/24   Alivia Burgess APRN   busPIRone (BUSPAR) 15 MG tablet Take 1 tablet by mouth 3 (Three) Times a Day. 11/11/24   Alivia Burgess APRN   citalopram (CeleXA) 40 MG tablet Take 1 tablet by mouth Daily. 10/30/24   Alivia Burgess APRN   hydroCHLOROthiazide 25 MG tablet Take 1 tablet by mouth Daily. 11/11/24   Alivia Burgess APRN   ipratropium-albuterol (DUO-NEB) 0.5-2.5 mg/3 ml nebulizer Take 3 mL by nebulization 3 (Three) Times a Day As Needed for Wheezing. 9/22/22   Tina Hartley APRN   losartan (COZAAR) 100 MG tablet Take 1 tablet by mouth Daily. 8/21/24   Alivia Burgess APRN   megestrol (MEGACE) 20 MG tablet TAKE ONE TABLET BY MOUTH DAILY 9/3/24   Alivia Burgess, APRN    methocarbamol (ROBAXIN) 750 MG tablet TAKE ONE TABLET BY MOUTH THREE TIMES DAILY 10/4/24   Jose Holman Jr., MD   omeprazole (priLOSEC) 20 MG capsule Take 1 capsule by mouth 2 (Two) Times a Day. 9/5/24   Alivia Burgess APRN   propranolol (INDERAL) 80 MG tablet TAKE ONE TABLET BY MOUTH TWICE DAILY @9AM-5PM 5/9/24   Tina Hartley APRN   traZODone (DESYREL) 100 MG tablet TAKE ONE TABLET BY MOUTH EVERY NIGHT 5/9/24   Tina Hartley APRN   tretinoin (Retin-A) 0.1 % cream Apply 1 Application topically to the appropriate area as directed Every Night. 11/18/24   Alivia Burgess APRN        Social History:   Social History     Tobacco Use    Smoking status: Every Day     Current packs/day: 1.00     Average packs/day: 1 pack/day for 50.5 years (50.5 ttl pk-yrs)     Types: Cigarettes     Start date: 6/1/1974    Smokeless tobacco: Never    Tobacco comments:     3/4 ppd   Vaping Use    Vaping status: Never Used   Substance Use Topics    Alcohol use: Never    Drug use: Yes     Types: Marijuana         Review of Systems:  Review of Systems   Constitutional:  Negative for chills and fever.   HENT:  Negative for congestion, rhinorrhea and sore throat.    Eyes:  Negative for photophobia.   Respiratory:  Positive for shortness of breath and wheezing. Negative for apnea, cough and chest tightness.    Cardiovascular:  Positive for chest pain. Negative for palpitations.   Gastrointestinal:  Negative for abdominal pain, diarrhea, nausea and vomiting.   Endocrine: Negative.    Genitourinary:  Negative for difficulty urinating and dysuria.   Musculoskeletal:  Negative for back pain, joint swelling and myalgias.   Skin:  Negative for color change and wound.   Allergic/Immunologic: Negative.    Neurological:  Negative for seizures and headaches.   Psychiatric/Behavioral: Negative.     All other systems reviewed and are negative.       Physical Exam:  /81 (BP Location: Right arm, Patient Position:  "Sitting)   Pulse 75   Temp 98.3 °F (36.8 °C) (Oral)   Resp 19   Ht 160 cm (63\")   Wt 44.4 kg (97 lb 14.2 oz)   SpO2 (!) 82%   BMI 17.34 kg/m²     Physical Exam  Vitals and nursing note reviewed.   Constitutional:       General: She is awake.      Appearance: Normal appearance. She is well-developed.   HENT:      Head: Normocephalic and atraumatic.      Nose: Nose normal.      Mouth/Throat:      Mouth: Mucous membranes are moist.   Eyes:      Extraocular Movements: Extraocular movements intact.      Pupils: Pupils are equal, round, and reactive to light.   Cardiovascular:      Rate and Rhythm: Normal rate and regular rhythm.      Heart sounds: Normal heart sounds.   Pulmonary:      Effort: Pulmonary effort is normal. No respiratory distress.      Breath sounds: Examination of the right-upper field reveals wheezing. Examination of the left-upper field reveals wheezing. Examination of the right-lower field reveals decreased breath sounds. Examination of the left-lower field reveals decreased breath sounds. Wheezing present. No rhonchi or rales.   Abdominal:      General: Bowel sounds are normal.      Palpations: Abdomen is soft.      Tenderness: There is no abdominal tenderness. There is no guarding or rebound.      Comments: No rigidity   Musculoskeletal:         General: No tenderness. Normal range of motion.      Cervical back: Normal range of motion and neck supple.   Skin:     General: Skin is warm and dry.      Coloration: Skin is not jaundiced.   Neurological:      General: No focal deficit present.      Mental Status: She is alert. Mental status is at baseline.   Psychiatric:         Mood and Affect: Mood normal.                  Procedures:  Procedures      Medical Decision Making:      Comorbidities that affect care:    COPD, anxiety/depression    External Notes reviewed:    Telephone Encounter:    Documentation  Open  11/29/2024  [facility]       [place], " [place]  Internal Medicine     Progress Notes  Wendy Mustafa APRN (Nurse Practitioner)  Internal Medicine  Unsigned  Patient called the after-hours call center regarding issue with oxygen home delivery from [employer].  Provider returned call to patient who stated that home oxygen was ordered approximately 2 weeks ago at visit with PCP.  She reports that she was notified by [employer] that there was a problem with the prescription.  Patient reports that her oxygen is dropping to 82% when she is walking.  This returns to 90 with rest.  Patient reports that she is not in any distress at this time but does not like to sit around.  Instructed the patient that if she is feeling more short of breath or if her oxygen is dropping below 90, she should be evaluated in the ER over the holiday weekend.  Discussed need to contact her PCP office first thing Monday morning to get oxygen prescription addressed.  Patient verbalized understanding.          The following orders were placed and all results were independently analyzed by me:  Orders Placed This Encounter   Procedures    XR Chest 1 View    [place] Draw    Comprehensive Metabolic Panel    BNP    Single High Sensitivity Troponin T    CBC Auto Differential    NPO Diet NPO Type: Strict NPO    Undress & Gown    Continuous Pulse Oximetry    Vital Signs    Check Pulse Oximetry while ambulating    Code Status and Medical Interventions: CPR (Attempt to Resuscitate); Full Support    Hospitalist (on-call MD unless specified)    Oxygen Therapy- Nasal Cannula; Titrate 1-6 LPM Per SpO2; 90 - 95%    ECG 12 Lead ED Triage Standing Order; SOA    Insert Peripheral IV    Inpatient Admission    CBC & Differential    Green Top (Gel)    Lavender Top    Gold Top - SST    Light Blue Top       Medications Given in the Emergency Department:  Medications   sodium chloride 0.9 % flush 10 mL (has no administration in time range)   methylPREDNISolone sodium succinate (SOLU-Medrol) injection 125  mg (125 mg Intravenous Given 11/29/24 1527)   ipratropium-albuterol (DUO-NEB) nebulizer solution 3 mL (3 mL Nebulization Given 11/29/24 1518)        ED Course:    ED Course as of 11/29/24 1722   Fri Nov 29, 2024   1502 I have personally interpreted the EKG today and it shows no evidence of any acute ischemia or heart arrhythmia. [RP]   1712 Hypoxic on room air here in the emergency department.  I turned her oxygen off and she was consistently at 87% at rest.  When ambulated she dropped to 81% per nursing report. [RP]      ED Course User Index  [RP] Erasmo Dixon MD       Labs:    Lab Results (last 24 hours)       Procedure Component Value Units Date/Time    CBC & Differential [[id number]]  (Normal) Collected: 11/29/24 1421    Specimen: Blood from Arm, Left Updated: 11/29/24 1431    Narrative:      The following orders were created for panel order CBC & Differential.  Procedure                               Abnormality         Status                     ---------                               -----------         ------                     CBC Auto Differential[[id number]]        Normal              Final result                 Please view results for these tests on the individual orders.    Comprehensive Metabolic Panel [[id number]]  (Abnormal) Collected: 11/29/24 1421    Specimen: Blood from Arm, Left Updated: 11/29/24 1451     Glucose 102 mg/dL      BUN 8 mg/dL      Creatinine 0.73 mg/dL      Sodium 140 mmol/L      Potassium 3.6 mmol/L      Chloride 101 mmol/L      CO2 28.0 mmol/L      Calcium 10.1 mg/dL      Total Protein 7.4 g/dL      Albumin 4.4 g/dL      ALT (SGPT) 13 U/L      AST (SGOT) 20 U/L      Alkaline Phosphatase 86 U/L      Total Bilirubin 0.6 mg/dL      Globulin 3.0 gm/dL      A/G Ratio 1.5 g/dL      BUN/Creatinine Ratio 11.0     Anion Gap 11.0 mmol/L      eGFR 92.0 mL/min/1.73     Narrative:      GFR Normal >60  Chronic Kidney Disease <60  Kidney Failure <15      BNP [[id number]]  (Normal) Collected:  11/29/24 1421    Specimen: Blood from Arm, Left Updated: 11/29/24 1449     proBNP 137.6 pg/mL     Narrative:      This assay is used as an aid in the diagnosis of individuals suspected of having heart failure. It can be used as an aid in the diagnosis of acute decompensated heart failure (ADHF) in patients presenting with signs and symptoms of ADHF to the emergency department (ED). In addition, NT-proBNP of <300 pg/mL indicates ADHF is not likely.    Age Range Result Interpretation  NT-proBNP Concentration (pg/mL:      <50             Positive            >450                   Gray                 300-450                    Negative             <300    50-75           Positive            >900                  Gray                300-900                  Negative            <300      >75             Positive            >1800                  Gray                300-1800                  Negative            <300    Single High Sensitivity Troponin T [[id number]]  (Normal) Collected: 11/29/24 1421    Specimen: Blood from Arm, Left Updated: 11/29/24 1451     HS Troponin T 7 ng/L     Narrative:      High Sensitive Troponin T Reference Range:  <14.0 ng/L- Negative Female for AMI  <22.0 ng/L- Negative Male for AMI  >=14 - Abnormal Female indicating possible myocardial injury.  >=22 - Abnormal Male indicating possible myocardial injury.   Clinicians would have to utilize clinical acumen, EKG, Troponin, and serial changes to determine if it is an Acute Myocardial Infarction or myocardial injury due to an underlying chronic condition.         CBC Auto Differential [[id number]]  (Normal) Collected: 11/29/24 1421    Specimen: Blood from Arm, Left Updated: 11/29/24 1431     WBC 9.33 10*3/mm3      RBC 4.65 10*6/mm3      Hemoglobin 14.5 g/dL      Hematocrit 43.4 %      MCV 93.3 fL      MCH 31.2 pg      MCHC 33.4 g/dL      RDW 13.4 %      RDW-SD 45.6 fl      MPV 9.4 fL      Platelets 240 10*3/mm3      Neutrophil % 64.3 %       Lymphocyte % 24.8 %      Monocyte % 8.8 %      Eosinophil % 1.5 %      Basophil % 0.4 %      Immature Grans % 0.2 %      Neutrophils, Absolute 6.00 10*3/mm3      Lymphocytes, Absolute 2.31 10*3/mm3      Monocytes, Absolute 0.82 10*3/mm3      Eosinophils, Absolute 0.14 10*3/mm3      Basophils, Absolute 0.04 10*3/mm3      Immature Grans, Absolute 0.02 10*3/mm3      nRBC 0.0 /100 WBC              Imaging:    XR Chest 1 View    Result Date: 11/29/2024  XR CHEST 1 VW Date of Exam: 11/29/2024 2:20 PM EST Indication: SOA Triage Protocol Comparison: 6/9/2024 Findings: Cardiomediastinal silhouette is unremarkable.  No airspace disease, pneumothorax, nor pleural effusion. There is a calcified left lower lobe granuloma. No acute osseous abnormality identified.     Impression: No active disease. Electronically Signed: Nate Tatum MD  11/29/2024 2:35 PM EST  Workstation ID: [id number]       Differential Diagnosis and Discussion:    Dyspnea: Differential diagnosis includes but is not limited to metabolic acidosis, neurological disorders, psychogenic, asthma, pneumothorax, upper airway obstruction, COPD, pneumonia, noncardiogenic pulmonary edema, interstitial lung disease, anemia, congestive heart failure, and pulmonary embolism    All labs were reviewed and interpreted by me.  All X-rays impressions were independently interpreted by me.  EKG was interpreted by me.    MDM                     Patient Care Considerations:    PERC: I used the PERC score to risk stratify the patient for PE and a CT of the chest was considered but ultimately not indicated in today's visit.      Consultants/Shared Management Plan:    Hospitalist: I have discussed the case with Dr. Li who agrees to accept the patient for admission.    Social Determinants of Health:    Patient is independent, reliable, and has access to care.       Disposition and Care Coordination:    Admit:   Through independent evaluation of the patient's history, physical, and  imperical data, the patient meets criteria for inpatient admission to the hospital.        Final diagnoses:   COPD exacerbation   Hypoxia        ED Disposition       ED Disposition   Decision to Admit    Condition   --    Comment   Level of Care: Telemetry [5]   Diagnosis: Acute respiratory failure with hypoxia [466047]   Admitting Physician: SACHI GOLDBERG [[id number]]   Attending Physician: SACHI GOLDBERG [[id number]]   Certification: I Certify That Inpatient Hospital Services Are Medically Necessary For Greater Than 2 Midnights                 This medical record created using voice recognition software.             Erasmo Dixon MD  11/29/24 6027

## 2024-11-30 ENCOUNTER — READMISSION MANAGEMENT (OUTPATIENT)
Dept: CALL CENTER | Facility: HOSPITAL | Age: 64
End: 2024-11-30
Payer: MEDICARE

## 2024-11-30 VITALS
HEART RATE: 68 BPM | DIASTOLIC BLOOD PRESSURE: 56 MMHG | WEIGHT: 97.88 LBS | TEMPERATURE: 98.2 F | OXYGEN SATURATION: 97 % | RESPIRATION RATE: 18 BRPM | SYSTOLIC BLOOD PRESSURE: 126 MMHG | BODY MASS INDEX: 17.34 KG/M2 | HEIGHT: 63 IN

## 2024-11-30 PROBLEM — J44.1 COPD WITH ACUTE EXACERBATION: Status: ACTIVE | Noted: 2021-08-17

## 2024-11-30 LAB
ANION GAP SERPL CALCULATED.3IONS-SCNC: 9.2 MMOL/L (ref 5–15)
BASOPHILS # BLD AUTO: 0.01 10*3/MM3 (ref 0–0.2)
BASOPHILS NFR BLD AUTO: 0.2 % (ref 0–1.5)
BUN SERPL-MCNC: 13 MG/DL (ref 8–23)
BUN/CREAT SERPL: 22 (ref 7–25)
CALCIUM SPEC-SCNC: 9.7 MG/DL (ref 8.6–10.5)
CHLORIDE SERPL-SCNC: 99 MMOL/L (ref 98–107)
CO2 SERPL-SCNC: 27.8 MMOL/L (ref 22–29)
CREAT SERPL-MCNC: 0.59 MG/DL (ref 0.57–1)
DEPRECATED RDW RBC AUTO: 45.1 FL (ref 37–54)
EGFRCR SERPLBLD CKD-EPI 2021: 100.8 ML/MIN/1.73
EOSINOPHIL # BLD AUTO: 0 10*3/MM3 (ref 0–0.4)
EOSINOPHIL NFR BLD AUTO: 0 % (ref 0.3–6.2)
ERYTHROCYTE [DISTWIDTH] IN BLOOD BY AUTOMATED COUNT: 13.2 % (ref 12.3–15.4)
FLUAV SUBTYP SPEC NAA+PROBE: NOT DETECTED
FLUBV RNA ISLT QL NAA+PROBE: NOT DETECTED
GLUCOSE SERPL-MCNC: 128 MG/DL (ref 65–99)
HCT VFR BLD AUTO: 40.1 % (ref 34–46.6)
HGB BLD-MCNC: 13.4 G/DL (ref 12–15.9)
IMM GRANULOCYTES # BLD AUTO: 0.02 10*3/MM3 (ref 0–0.05)
IMM GRANULOCYTES NFR BLD AUTO: 0.3 % (ref 0–0.5)
LYMPHOCYTES # BLD AUTO: 1.16 10*3/MM3 (ref 0.7–3.1)
LYMPHOCYTES NFR BLD AUTO: 17.5 % (ref 19.6–45.3)
MAGNESIUM SERPL-MCNC: 1.8 MG/DL (ref 1.6–2.4)
MCH RBC QN AUTO: 31.1 PG (ref 26.6–33)
MCHC RBC AUTO-ENTMCNC: 33.4 G/DL (ref 31.5–35.7)
MCV RBC AUTO: 93 FL (ref 79–97)
MONOCYTES # BLD AUTO: 0.28 10*3/MM3 (ref 0.1–0.9)
MONOCYTES NFR BLD AUTO: 4.2 % (ref 5–12)
NEUTROPHILS NFR BLD AUTO: 5.16 10*3/MM3 (ref 1.7–7)
NEUTROPHILS NFR BLD AUTO: 77.8 % (ref 42.7–76)
NRBC BLD AUTO-RTO: 0 /100 WBC (ref 0–0.2)
PLATELET # BLD AUTO: 223 10*3/MM3 (ref 140–450)
PMV BLD AUTO: 10 FL (ref 6–12)
POTASSIUM SERPL-SCNC: 3.2 MMOL/L (ref 3.5–5.2)
RBC # BLD AUTO: 4.31 10*6/MM3 (ref 3.77–5.28)
RSV RNA NPH QL NAA+NON-PROBE: NOT DETECTED
SARS-COV-2 RNA RESP QL NAA+PROBE: NOT DETECTED
SODIUM SERPL-SCNC: 136 MMOL/L (ref 136–145)
WBC NRBC COR # BLD AUTO: 6.63 10*3/MM3 (ref 3.4–10.8)

## 2024-11-30 PROCEDURE — 94799 UNLISTED PULMONARY SVC/PX: CPT

## 2024-11-30 PROCEDURE — 96372 THER/PROPH/DIAG INJ SC/IM: CPT

## 2024-11-30 PROCEDURE — 25010000002 METHYLPREDNISOLONE PER 40 MG: Performed by: STUDENT IN AN ORGANIZED HEALTH CARE EDUCATION/TRAINING PROGRAM

## 2024-11-30 PROCEDURE — 83735 ASSAY OF MAGNESIUM: CPT | Performed by: STUDENT IN AN ORGANIZED HEALTH CARE EDUCATION/TRAINING PROGRAM

## 2024-11-30 PROCEDURE — 96376 TX/PRO/DX INJ SAME DRUG ADON: CPT

## 2024-11-30 PROCEDURE — 87637 SARSCOV2&INF A&B&RSV AMP PRB: CPT | Performed by: INTERNAL MEDICINE

## 2024-11-30 PROCEDURE — 96375 TX/PRO/DX INJ NEW DRUG ADDON: CPT

## 2024-11-30 PROCEDURE — 25010000002 ENOXAPARIN PER 10 MG: Performed by: STUDENT IN AN ORGANIZED HEALTH CARE EDUCATION/TRAINING PROGRAM

## 2024-11-30 PROCEDURE — 94664 DEMO&/EVAL PT USE INHALER: CPT

## 2024-11-30 PROCEDURE — 80048 BASIC METABOLIC PNL TOTAL CA: CPT | Performed by: STUDENT IN AN ORGANIZED HEALTH CARE EDUCATION/TRAINING PROGRAM

## 2024-11-30 PROCEDURE — G0378 HOSPITAL OBSERVATION PER HR: HCPCS

## 2024-11-30 PROCEDURE — 25010000002 KETOROLAC TROMETHAMINE PER 15 MG: Performed by: INTERNAL MEDICINE

## 2024-11-30 PROCEDURE — 85025 COMPLETE CBC W/AUTO DIFF WBC: CPT | Performed by: STUDENT IN AN ORGANIZED HEALTH CARE EDUCATION/TRAINING PROGRAM

## 2024-11-30 RX ORDER — BUTALBITAL, ACETAMINOPHEN AND CAFFEINE 300; 40; 50 MG/1; MG/1; MG/1
1 CAPSULE ORAL EVERY 4 HOURS PRN
Status: DISCONTINUED | OUTPATIENT
Start: 2024-11-30 | End: 2024-11-30 | Stop reason: HOSPADM

## 2024-11-30 RX ORDER — MEGESTROL ACETATE 40 MG/1
20 TABLET ORAL DAILY
Status: DISCONTINUED | OUTPATIENT
Start: 2024-11-30 | End: 2024-11-30 | Stop reason: HOSPADM

## 2024-11-30 RX ORDER — BUDESONIDE 0.5 MG/2ML
0.5 INHALANT ORAL
Status: DISCONTINUED | OUTPATIENT
Start: 2024-11-30 | End: 2024-11-30 | Stop reason: HOSPADM

## 2024-11-30 RX ORDER — KETOROLAC TROMETHAMINE 15 MG/ML
15 INJECTION, SOLUTION INTRAMUSCULAR; INTRAVENOUS ONCE
Status: COMPLETED | OUTPATIENT
Start: 2024-11-30 | End: 2024-11-30

## 2024-11-30 RX ORDER — AMLODIPINE BESYLATE 5 MG/1
2.5 TABLET ORAL DAILY
Status: DISCONTINUED | OUTPATIENT
Start: 2024-11-30 | End: 2024-11-30 | Stop reason: HOSPADM

## 2024-11-30 RX ORDER — PANTOPRAZOLE SODIUM 40 MG/1
40 TABLET, DELAYED RELEASE ORAL
Status: DISCONTINUED | OUTPATIENT
Start: 2024-11-30 | End: 2024-11-30 | Stop reason: HOSPADM

## 2024-11-30 RX ORDER — PREDNISONE 20 MG/1
20 TABLET ORAL DAILY
Qty: 5 TABLET | Refills: 0 | Status: SHIPPED | OUTPATIENT
Start: 2024-11-30 | End: 2024-12-05

## 2024-11-30 RX ORDER — ASPIRIN 81 MG/1
81 TABLET ORAL DAILY
Status: DISCONTINUED | OUTPATIENT
Start: 2024-11-30 | End: 2024-11-30 | Stop reason: HOSPADM

## 2024-11-30 RX ORDER — ARFORMOTEROL TARTRATE 15 UG/2ML
15 SOLUTION RESPIRATORY (INHALATION)
Status: DISCONTINUED | OUTPATIENT
Start: 2024-11-30 | End: 2024-11-30 | Stop reason: HOSPADM

## 2024-11-30 RX ADMIN — AMLODIPINE BESYLATE 2.5 MG: 5 TABLET ORAL at 09:34

## 2024-11-30 RX ADMIN — BUSPIRONE HYDROCHLORIDE 15 MG: 15 TABLET ORAL at 09:35

## 2024-11-30 RX ADMIN — BUSPIRONE HYDROCHLORIDE 15 MG: 15 TABLET ORAL at 15:29

## 2024-11-30 RX ADMIN — ASPIRIN 81 MG: 81 TABLET, COATED ORAL at 09:34

## 2024-11-30 RX ADMIN — PROPRANOLOL HYDROCHLORIDE 80 MG: 40 TABLET ORAL at 09:34

## 2024-11-30 RX ADMIN — IPRATROPIUM BROMIDE AND ALBUTEROL SULFATE 3 ML: .5; 3 SOLUTION RESPIRATORY (INHALATION) at 06:37

## 2024-11-30 RX ADMIN — PANTOPRAZOLE SODIUM 40 MG: 40 TABLET, DELAYED RELEASE ORAL at 06:08

## 2024-11-30 RX ADMIN — ATORVASTATIN CALCIUM 10 MG: 10 TABLET, FILM COATED ORAL at 09:34

## 2024-11-30 RX ADMIN — Medication 10 ML: at 09:36

## 2024-11-30 RX ADMIN — MEGESTROL ACETATE 20 MG: 40 TABLET ORAL at 09:34

## 2024-11-30 RX ADMIN — BUTALBITA,ACETAMINOPHEN AND CAFFEINE 1 CAPSULE: 50; 300; 40 CAPSULE ORAL at 13:52

## 2024-11-30 RX ADMIN — IPRATROPIUM BROMIDE AND ALBUTEROL SULFATE 3 ML: .5; 3 SOLUTION RESPIRATORY (INHALATION) at 11:52

## 2024-11-30 RX ADMIN — METHYLPREDNISOLONE SODIUM SUCCINATE 40 MG: 40 INJECTION, POWDER, FOR SOLUTION INTRAMUSCULAR; INTRAVENOUS at 09:35

## 2024-11-30 RX ADMIN — ACETAMINOPHEN 650 MG: 325 TABLET ORAL at 10:04

## 2024-11-30 RX ADMIN — ENOXAPARIN SODIUM 30 MG: 100 INJECTION SUBCUTANEOUS at 09:35

## 2024-11-30 RX ADMIN — IPRATROPIUM BROMIDE AND ALBUTEROL SULFATE 3 ML: .5; 3 SOLUTION RESPIRATORY (INHALATION) at 14:57

## 2024-11-30 RX ADMIN — BUDESONIDE 0.5 MG: 0.5 SUSPENSION RESPIRATORY (INHALATION) at 14:58

## 2024-11-30 RX ADMIN — KETOROLAC TROMETHAMINE 15 MG: 15 INJECTION, SOLUTION INTRAMUSCULAR; INTRAVENOUS at 15:29

## 2024-11-30 RX ADMIN — ARFORMOTEROL TARTRATE 15 MCG: 15 SOLUTION RESPIRATORY (INHALATION) at 14:57

## 2024-11-30 NOTE — OUTREACH NOTE
Prep Survey      Flowsheet Row Responses   [facility] from? [facility]   Is LACE score < 7 ? Yes   Eligibility [facility]   Date of Admission 11/29/24   Date of Discharge 11/30/24   Discharge Disposition Home or Self Care   Discharge diagnosis Acute respiratory failure with hypoxia   Does the patient have one of the following disease processes/diagnoses(primary or secondary)? COPD   Does the patient have Home health ordered? No   Is there a DME ordered? No   Medication alerts for this patient Prednisone   Prep survey completed? Yes            Betty LEGER - Registered Nurse

## 2024-11-30 NOTE — PROGRESS NOTES
Walking Oximetry Progress Note      Patient Name:  Cindy Bella  YOB: 1960  Date of Procedure: 11/30/24              ROOM AIR BASELINE   SpO2% 96 AFTER REMOVING O2   Heart Rate 73     EXERCISE ON ROOM AIR SpO2% EXERCISE ON O2 LPM SpO2%   1 MINUTE 91 1 MINUTE     2 MINUTES 90 2 MINUTES     3 MINUTES 90 3 MINUTES     4 MINUTES 89 4 MINUTES     5 MINUTES 90 5 MINUTES     6 MINUTES 92 6 MINUTES                Time to Recovery  0   SpO2% Post Exercise  92 on ROOM AIR.    HR Post Exercise  84     Comments:           Electronically signed by Claire Mistry, TYRA, 11/30/24, 4:52 PM EST.

## 2024-11-30 NOTE — PLAN OF CARE
Goal Outcome Evaluation:              Outcome Evaluation: Patient alert and oriented x4, VSS. Patient up ad julian, and tolerating 2.5L NC well. Patient medicated per the MAR. Patient denies any pain or needs at this time. Will continue to monitor plan of care.

## 2024-11-30 NOTE — DISCHARGE SUMMARY
[facility]         HOSPITALIST  DISCHARGE SUMMARY    Patient Name: Cindy Bella  : 1960  MRN: [id number]    Date of Admission: 2024  Date of Discharge:  2024    Primary Care Physician: Alivia Burgess APRN    Consults       Date and Time Order Name Status Description    2024  5:12 PM Hospitalist (on-call MD unless specified)              Active and Resolved Hospital Problems:  Active Hospital Problems    Diagnosis POA    **Acute respiratory failure with hypoxia [J96.01] Yes    COPD with acute exacerbation [J44.1] Yes    Essential hypertension [I10] Yes      Resolved Hospital Problems   No resolved problems to display.       Hospital Course     Hospital Course:  Cindy Bella is a 63 y.o. female  with past medical history of hypertension, hyperlipidemia, SVT status post ablation, scoliosis, COPD not on home oxygen, anxiety/depression who presented to the ER due to low oxygen level.  Patient has been working with pulmonology in the outpatient due to worsening exertional dyspnea for the last few weeks.  She is apparently having some oxygen arranged but  she has not been able to get it at home.  In that time she is also  worsening exertional dyspnea and persistent hypoxia down to the 80s at home.  Today her dyspnea was particularly worse and her daughter checked her pulse ox and it found to be in the 60s and 70s at which point they called EMS and had her brought to the ER for evaluation.  Upon arrival patient has been hypoxic to the low 80s on room air requiring 2 L nasal cannula with improvement.  Lab workup is unremarkable including a negative troponin and BNP.  Chest x-ray is negative.  Patient was placed on oxygen and found to be wheezing on exam at which point she was started on steroids and DuoNebs for COPD exacerbation.  Hospitalist then contacted for admission.  Patient denies having any chest pain.  Denies any worsening in her productive cough but  continues to smoke 1 or 2 cigarettes a day.  She also denies any fevers or chills.  No sick contacts.  No lower extremity edema.  Patient was started on breathing treatments as well as IV steroids.  Patient is feeling better however is complaining of headache, it is not unusual for her to get headaches however this has not resolved with Tylenol.  Patient does states she has a history of a benign brain tumor however I do not see any documentation of that as she sees an outside hospital system.  Patient has declined for me to do any head imaging today and request to go home after  arranges for home oxygen.  At this time will give patient a short course of oral steroids for her wheezing and discharge patient home with oxygen.  Patient does have follow-up with her primary care as well as with pulmonary.  Patient will be discharged home in stable condition.       DISCHARGE Follow Up Recommendations for labs and diagnostics:   Follow up with primary care in 1 week       Day of Discharge     Vital Signs:  Temp:  [97.9 °F (36.6 °C)-98.8 °F (37.1 °C)] 98.4 °F (36.9 °C)  Heart Rate:  [51-75] 70  Resp:  [18-21] 18  BP: ()/(53-69) 133/57  Flow (L/min) (Oxygen Therapy):  [2] 2  Physical Exam:   General: Awake, alert, NAD.  Resting in bed  HENT: NCAT, MMM  Eyes: pupils equal, no scleral icterus  Cardiovascular: RRR, no murmurs   Pulmonary: Decreased, diffuse wheezing  Gastrointestinal: S/ND/NT, +BS  Musculoskeletal: No gross deformities.  Patient does have scoliosis of her back  Skin: No jaundice, no rash on exposed skin appreciated  Neuro: CN II through XII grossly intact; speech clear; no tremor  Psych: Mood and affect appropriate  : No Varghese catheter; no suprapubic tenderness      Discharge Details        Discharge Medications        New Medications        Instructions Start Date   predniSONE 20 MG tablet  Commonly known as: DELTASONE   20 mg, Oral, Daily             Changes to Medications         Instructions Start Date   propranolol 80 MG tablet  Commonly known as: INDERAL  What changed: See the new instructions.   TAKE ONE TABLET BY MOUTH TWICE DAILY @9AM-5PM             Continue These Medications        Instructions Start Date   albuterol sulfate  (90 Base) MCG/ACT inhaler  Commonly known as: PROVENTIL HFA;VENTOLIN HFA;PROAIR HFA   2 puffs, Inhalation, Every 4 Hours PRN      amLODIPine 2.5 MG tablet  Commonly known as: NORVASC   2.5 mg, Daily      aspirin 81 MG EC tablet  Commonly known as: Aspirin Low Dose   81 mg, Oral, Daily      atorvastatin 10 MG tablet  Commonly known as: LIPITOR   10 mg, Oral, Daily      Breztri Aerosphere 160-9-4.8 MCG/ACT aerosol inhaler  Generic drug: Budeson-Glycopyrrol-Formoterol   2 puffs, Inhalation, 2 Times Daily      busPIRone 10 MG tablet  Commonly known as: BUSPAR   10 mg, 2 Times Daily      citalopram 40 MG tablet  Commonly known as: CeleXA   40 mg, Oral, Daily      hydroCHLOROthiazide 25 MG tablet   25 mg, Oral, Daily      losartan 100 MG tablet  Commonly known as: COZAAR   100 mg, Oral, Daily      megestrol 20 MG tablet  Commonly known as: MEGACE   20 mg, Oral, Daily      methocarbamol 750 MG tablet  Commonly known as: ROBAXIN   750 mg, Oral, 3 Times Daily      omeprazole 20 MG capsule  Commonly known as: priLOSEC   20 mg, Oral, 2 Times Daily      traZODone 100 MG tablet  Commonly known as: DESYREL   100 mg, Oral, Every Night at Bedtime      tretinoin 0.1 % cream  Commonly known as: Retin-A   1 Application, Topical, Nightly               Allergies   Allergen Reactions    Sumatriptan Shortness Of Breath    Latex Itching    Levofloxacin Nausea Only    Nsaids Itching    Amoxicillin-Pot Clavulanate Rash    Pseudoephedrine Rash       Discharge Disposition:  Home or Self Care    Diet:  Hospital:  Diet Order   Procedures    Diet: Cardiac; Healthy Heart (2-3 Na+); Fluid Consistency: Thin (IDDSI 0)       Discharge Activity: as tolerated       CODE STATUS:  Code Status  and Medical Interventions: CPR (Attempt to Resuscitate); Full Support   Ordered at: 11/29/24 1722     Code Status (Patient has no pulse and is not breathing):    CPR (Attempt to Resuscitate)     Medical Interventions (Patient has pulse or is breathing):    Full Support         Future Appointments   Date Time Provider Department Center   2/5/2025  1:30 PM Jean Webb MD [facility]           Pertinent  and/or Most Recent Results     PROCEDURES:   None    LAB RESULTS:      Lab 11/30/24  0418 11/29/24  1421   WBC 6.63 9.33   HEMOGLOBIN 13.4 14.5   HEMATOCRIT 40.1 43.4   PLATELETS 223 240   NEUTROS ABS 5.16 6.00   IMMATURE GRANS (ABS) 0.02 0.02   LYMPHS ABS 1.16 2.31   MONOS ABS 0.28 0.82   EOS ABS 0.00 0.14   MCV 93.0 93.3         Lab 11/30/24  0418 11/29/24  1421   SODIUM 136 140   POTASSIUM 3.2* 3.6   CHLORIDE 99 101   CO2 27.8 28.0   ANION GAP 9.2 11.0   BUN 13 8   CREATININE 0.59 0.73   EGFR 100.8 92.0   GLUCOSE 128* 102*   CALCIUM 9.7 10.1   MAGNESIUM 1.8  --          Lab 11/29/24  1421   TOTAL PROTEIN 7.4   ALBUMIN 4.4   GLOBULIN 3.0   ALT (SGPT) 13   AST (SGOT) 20   BILIRUBIN 0.6   ALK PHOS 86         Lab 11/29/24  1421   PROBNP 137.6   HSTROP T 7                 Brief Urine Lab Results       None          Microbiology Results (last 10 days)       Procedure Component Value - Date/Time    COVID-19, FLU A/B, RSV PCR 1 HR TAT - Swab, Nasopharynx [[id number]]  (Normal) Collected: 11/30/24 1219    Lab Status: Final result Specimen: Swab from Nasopharynx Updated: 11/30/24 1356     COVID19 Not Detected     Influenza A PCR Not Detected     Influenza B PCR Not Detected     RSV, PCR Not Detected    Narrative:      Fact sheet for providers: https://www.fda.gov/media/903072/download    Fact sheet for patients: https://www.fda.gov/media/880508/download    Test performed by PCR.            XR Chest 1 View    Result Date: 11/29/2024  Impression: No active disease. Electronically Signed: Nate Tatum MD  11/29/2024 2:35  PM EST  Workstation ID: [id number]                  Labs Pending at Discharge:        Time spent on Discharge including face to face service:  more than 35  minutes    Electronically signed by Kb Blount DO, 11/30/24, 3:31 PM EST.

## 2024-11-30 NOTE — PROGRESS NOTES
[facility]   Hospitalist Progress Note  Date: 2024  Patient Name: Cindy Bella  : 1960  MRN: [id number]  Date of admission: 2024  Room/Bed: [facility]/      Subjective   Subjective     Chief Complaint: short of breath and cannot get oxygen     Summary:Cindy Bella is a 63 y.o. female female past medical history of hypertension, hyperlipidemia, SVT status post ablation, scoliosis, COPD not on home oxygen, anxiety/depression who presents to the ER due to low oxygen level.  Patient has been working with pulmonology in the outpatient due to worsening exertional dyspnea for the last few weeks.  She is apparently having some oxygen arranged but  she has not been able to get it at home.  In that time she is also  worsening exertional dyspnea and persistent hypoxia down to the 80s at home.  Today her dyspnea was particularly worse and her daughter checked her pulse ox and it found to be in the 60s and 70s at which point they called EMS and had her brought to the ER for evaluation     Upon arrival patient has been hypoxic to the low 80s on room air requiring 2 L nasal cannula with improvement.  Lab workup is unremarkable including a negative troponin and BNP.  Chest x-ray is negative.  Patient was placed on oxygen and found to be wheezing on exam at which point she was started on steroids and DuoNebs for COPD exacerbation.  Hospitalist then contacted for admission.  Patient denies having any chest pain.  Denies any worsening in her productive cough but continues to smoke 1 or 2 cigarettes a day.  She also denies any fevers or chills.  No sick contacts.  No lower extremity edema.    Interval Followup:   Patient currently remains on 2 L of oxygen.  Patient continues to have diffuse wheezing  Patient overall states she is feeling better  She is receiving IV steroids    Review of Systems    All systems reviewed and negative except for what is outlined above.      Objective   Objective     Vitals:    Temp:  [97.9 °F (36.6 °C)-98.8 °F (37.1 °C)] 98.8 °F (37.1 °C)  Heart Rate:  [51-87] 57  Resp:  [18-21] 20  BP: ()/(53-81) 114/57  Flow (L/min) (Oxygen Therapy):  [2] 2    Physical Exam   General: Awake, alert, NAD.  Resting in bed  HENT: NCAT, MMM  Eyes: pupils equal, no scleral icterus  Cardiovascular: RRR, no murmurs   Pulmonary: Decreased, diffuse wheezing  Gastrointestinal: S/ND/NT, +BS  Musculoskeletal: No gross deformities.  Patient does have scoliosis of her back  Skin: No jaundice, no rash on exposed skin appreciated  Neuro: CN II through XII grossly intact; speech clear; no tremor  Psych: Mood and affect appropriate  : No Varghese catheter; no suprapubic tenderness    Result Review    Result Review:  I have personally reviewed these results:  [x]  Laboratory      Lab 11/30/24  0418 11/29/24  1421   WBC 6.63 9.33   HEMOGLOBIN 13.4 14.5   HEMATOCRIT 40.1 43.4   PLATELETS 223 240   NEUTROS ABS 5.16 6.00   IMMATURE GRANS (ABS) 0.02 0.02   LYMPHS ABS 1.16 2.31   MONOS ABS 0.28 0.82   EOS ABS 0.00 0.14   MCV 93.0 93.3         Lab 11/30/24  0418 11/29/24  1421   SODIUM 136 140   POTASSIUM 3.2* 3.6   CHLORIDE 99 101   CO2 27.8 28.0   ANION GAP 9.2 11.0   BUN 13 8   CREATININE 0.59 0.73   EGFR 100.8 92.0   GLUCOSE 128* 102*   CALCIUM 9.7 10.1   MAGNESIUM 1.8  --          Lab 11/29/24  1421   TOTAL PROTEIN 7.4   ALBUMIN 4.4   GLOBULIN 3.0   ALT (SGPT) 13   AST (SGOT) 20   BILIRUBIN 0.6   ALK PHOS 86         Lab 11/29/24  1421   PROBNP 137.6   HSTROP T 7                 Brief Urine Lab Results       None          [x]  Microbiology   Microbiology Results (last 10 days)       ** No results found for the last 240 hours. **          [x]  Radiology  XR Chest 1 View    Result Date: 11/29/2024  Impression: No active disease. Electronically Signed: Nate Tatum MD  11/29/2024 2:35 PM EST  Workstation ID: [id number]   []  EKG/Telemetry   []  Cardiology/Vascular   []  Pathology  []  Old records  []  Other:    Assessment  & Plan   Assessment / Plan     Assessment:  Acute COPD exacerbation  Severe restrictive lung disease  History of SVT status post ablation  History of a brain tumor that is nonmalignant  History of tobacco abuse  Hypertension  Scoliosis    Plan:  PFT from 1/2024 shows Severe mixed obstructive and restrictive lung defect present.  No bronchodilator response was noted.  Diffusion capacity moderately reduced.  Continue patient on DuoNebs.  Will start patient on Brovana and Pulmicort  Continue patient on IV steroids  Social work has been consulted to help with patient's home oxygen  Will add Fioricet as needed for headache  Resume patient on her home medication  Will check respiratory panel as patient does have increasing respiratory symptoms  Discussed plan with patient and with nurse       Discussed with RN.    VTE Prophylaxis:  Pharmacologic VTE prophylaxis orders are present.        CODE STATUS:   Code Status (Patient has no pulse and is not breathing): CPR (Attempt to Resuscitate)  Medical Interventions (Patient has pulse or is breathing): Full Support      Electronically signed by Kb Blount DO, 11/30/2024, 10:22 EST.

## 2024-12-02 ENCOUNTER — TELEPHONE (OUTPATIENT)
Dept: PULMONOLOGY | Facility: CLINIC | Age: 64
End: 2024-12-02
Payer: MEDICARE

## 2024-12-02 ENCOUNTER — TRANSITIONAL CARE MANAGEMENT TELEPHONE ENCOUNTER (OUTPATIENT)
Dept: CALL CENTER | Facility: HOSPITAL | Age: 64
End: 2024-12-02
Payer: MEDICARE

## 2024-12-02 NOTE — OUTREACH NOTE
Call Center TCM Note      Flowsheet Row Responses   [facility] discharged from? [facility]   Does the patient have one of the following disease processes/diagnoses(primary or secondary)? COPD   TCM attempt successful? Yes  [[facility]_ [place] [facility] [place] [phone]]   Call start time 0818   Call end time 0833   Discharge diagnosis Acute respiratory failure with hypoxia   Meds reviewed with patient/caregiver? Yes   Is the patient having any side effects they believe may be caused by any medication additions or changes? No   Does the patient have all medications ordered at discharge? No   Nursing Interventions Nurse provided patient education   Medication comments Daughter states Pharm closed yesterday and car is broke down but they will have someone to  med today.   Comments No available Hosp DC Fu appts with Consuelo or Dr Holman, ( only providers pt wants to see).   Does the patient have an appointment with their PCP within 7-14 days of discharge? No appointments available   Nursing Interventions Routed TCM call to PCP office   Has home health visited the patient within 72 hours of discharge? N/A   Pulse Ox monitoring Intermittent   Pulse Ox device source Patient   O2 Sat comments upper 80s to 90%   O2 Sat: education provided Sat levels, Monitoring frequency, When to seek care   Psychosocial issues? No   Did the patient receive a copy of their discharge instructions? Yes   Nursing interventions Reviewed instructions with patient   What is the patient's perception of their health status since discharge? Same   Nursing Interventions Nurse provided patient education   If the patient is a current smoker, are they able to teach back resources for cessation? --  [Prior to admit, no smoking since being  discharge]   Is the patient/caregiver able to teach back the hierarchy of who to call/visit for symptoms/problems? PCP, Specialist, Home health nurse, Urgent Care, ED, 911 Yes   Patient reports what zone on this  call? Yellow Zone   Yellow Zone More breathless than usual, I have less energy for my daily activities   Yellow interventions Call provider immediately if symptoms don't improve: they may indicate that an adjustment in medication or oxygen therapy is needed, Start an oral corticosteroid if ordered, Continue taking daily medications, Avoid second-hand smoke, e-cigarette aerosol, and other inhaled irritants   TCM call completed? Yes   Wrap up additional comments spoke with daughter and Pt. Pt not improving. Reports that sats are staying in upper 80s and sometimes she can get it to 90%. Pt has nebs at home that she is using , however did not qualify for O2. Pt has not picked up prednisone yet and states they will get today. Educated on the importance of taking med. Will alert PCP . Did advise Pt if severe aSOA at rest or sats will not come up to 90% she needs to seek treatment. Advised Pt to call [facility] office at this time.   Call end time [id number]            Shonna Meza RN    12/2/2024, 08:35 EST

## 2024-12-02 NOTE — TELEPHONE ENCOUNTER
Patients daughter called and stated that the patient was admitted into the hospital and was released with prednisone. The patient stated that the medication is giving her a really bad headache and they are wondering if there is something different that can be sent in

## 2024-12-04 RX ORDER — METHOCARBAMOL 750 MG/1
750 TABLET, FILM COATED ORAL 3 TIMES DAILY
Qty: 90 TABLET | Refills: 11 | Status: SHIPPED | OUTPATIENT
Start: 2024-12-04

## 2024-12-09 ENCOUNTER — OFFICE VISIT (OUTPATIENT)
Dept: PULMONOLOGY | Facility: CLINIC | Age: 64
End: 2024-12-09
Payer: MEDICARE

## 2024-12-09 VITALS
DIASTOLIC BLOOD PRESSURE: 70 MMHG | TEMPERATURE: 97.5 F | RESPIRATION RATE: 16 BRPM | SYSTOLIC BLOOD PRESSURE: 107 MMHG | BODY MASS INDEX: 16.44 KG/M2 | HEART RATE: 72 BPM | HEIGHT: 63 IN | WEIGHT: 92.8 LBS | OXYGEN SATURATION: 95 %

## 2024-12-09 DIAGNOSIS — F17.209 NICOTINE DEPENDENCE WITH NICOTINE-INDUCED DISORDER, UNSPECIFIED NICOTINE PRODUCT TYPE: ICD-10-CM

## 2024-12-09 DIAGNOSIS — J44.1 COPD WITH ACUTE EXACERBATION: Primary | ICD-10-CM

## 2024-12-09 DIAGNOSIS — M41.20 IDIOPATHIC SCOLIOSIS AND KYPHOSCOLIOSIS: ICD-10-CM

## 2024-12-09 PROCEDURE — 3074F SYST BP LT 130 MM HG: CPT | Performed by: INTERNAL MEDICINE

## 2024-12-09 PROCEDURE — 1159F MED LIST DOCD IN RCRD: CPT | Performed by: INTERNAL MEDICINE

## 2024-12-09 PROCEDURE — 99214 OFFICE O/P EST MOD 30 MIN: CPT | Performed by: INTERNAL MEDICINE

## 2024-12-09 PROCEDURE — 1160F RVW MEDS BY RX/DR IN RCRD: CPT | Performed by: INTERNAL MEDICINE

## 2024-12-09 PROCEDURE — 3078F DIAST BP <80 MM HG: CPT | Performed by: INTERNAL MEDICINE

## 2024-12-09 RX ORDER — ROFLUMILAST 500 UG/1
500 TABLET ORAL DAILY
Qty: 30 TABLET | Refills: 11 | Status: SHIPPED | OUTPATIENT
Start: 2024-12-09

## 2024-12-09 RX ORDER — ROFLUMILAST 250 UG/1
250 TABLET ORAL DAILY
Qty: 30 TABLET | Refills: 0 | Status: SHIPPED | OUTPATIENT
Start: 2024-12-09

## 2024-12-09 RX ORDER — ALBUTEROL SULFATE 0.83 MG/ML
2.5 SOLUTION RESPIRATORY (INHALATION) 4 TIMES DAILY PRN
Qty: 120 EACH | Refills: 11 | Status: SHIPPED | OUTPATIENT
Start: 2024-12-09

## 2024-12-09 NOTE — PROGRESS NOTES
"Chief Complaint  Hospital Follow Up Visit (11/29/24), COPD, Cough (Sometimes productive- Clear), Wheezing, and Shortness of Breath (SOA- with exertion)    Subjective        Cindy Bella presents to [facility]  History of Present Illness  History of Present Illness  The patient is a 63-year-old female who presents for evaluation of severe COPD.    She reports feeling significantly better than she did a week ago. She was admitted to the ER overnight due to a COPD flare-up, which occurred after a bout of pneumonia approximately 4 months prior. She has a history of bronchial asthma from childhood.    She has been a smoker for many years and has reduced her smoking habit to about 2 cigarettes a day, taking only a few puffs each time. Despite not being in contact with any sick individuals, she is frequently exposed to smokers. She occasionally coughs up thick, clear mucus, a symptom that has been present intermittently for about a year.    She has been treated with steroids twice in the past 6 months but is allergic to prednisone, which causes her blood pressure to rise and induces severe headaches. She uses a nebulizer at least 4 times a day with plain albuterol, which provides some relief. She has also been diagnosed with pneumonia by Dr. Alivia Momin.    ALLERGIES  She is allergic to PREDNISONE.    IMMUNIZATIONS  She has received her influenza vaccine this year. She has also received her pneumonia vaccine. She had pertussis vaccine 2 years ago.    Objective   Vital Signs:  /70 (BP Location: Left arm, Patient Position: Sitting, Cuff Size: Adult)   Pulse 72   Temp 97.5 °F (36.4 °C) (Temporal)   Resp 16   Ht 160 cm (63\")   Wt 42.1 kg (92 lb 12.8 oz)   SpO2 95% Comment: room air  BMI 16.44 kg/m²   Estimated body mass index is 16.44 kg/m² as calculated from the following:    Height as of this encounter: 160 cm (63\").    Weight as of this encounter: " 42.1 kg (92 lb 12.8 oz).            Physical Exam   Physical Exam  Vital Signs Reviewed  General WDWN, Alert, NAD.    Chest:  good aeration, clear to auscultation bilaterally, tympanic to percussion bilaterally, no work of breathing noted  CV: RRR, no MGR, .  EXT:  no clubbing, no cyanosis, no edema, no joint tenderness  Neuro:  A&Ox3, CN grossly intact, no focal deficits.  Skin: No rashes or lesions noted    Result Review :         Results                Assessment and Plan   Diagnoses and all orders for this visit:    1. COPD with acute exacerbation (Primary)    2. Idiopathic scoliosis and kyphoscoliosis    3. Nicotine dependence with nicotine-induced disorder, unspecified nicotine product type    Other orders  -     albuterol (PROVENTIL) (2.5 MG/3ML) 0.083% nebulizer solution; Take 2.5 mg by nebulization 4 (Four) Times a Day As Needed for Wheezing.  Dispense: 120 each; Refill: 11  -     roflumilast (Daliresp) 250 MCG tablet tablet; Take 1 tablet by mouth Daily.  Dispense: 30 tablet; Refill: 0  -     roflumilast (Daliresp) 500 MCG tablet tablet; Take 1 tablet by mouth Daily.  Dispense: 30 tablet; Refill: 11  -     RSVPreF3 Vac Recomb Adjuvanted (AREXVY) 120 MCG/0.5ML reconstituted suspension injection; Inject 0.5 mL into the appropriate muscle as directed by prescriber 1 (One) Time for 1 dose.  Dispense: 0.5 mL; Refill: 0      Assessment & Plan  1. Severe chronic obstructive pulmonary disease (COPD).  She was recently hospitalized overnight due to a COPD flare. She has reduced her smoking to about two cigarettes a day but is still exposed to secondhand smoke. She occasionally coughs up thick, clear mucus. She has been on steroids twice in the last six months but is allergic to prednisone, which causes severe side effects.   A prescription for Daliresp 250 mcg to be taken nightly for the initial 28 to 30 days was provided. A subsequent prescription for the full dose will be issued. Albuterol was also prescribed for  use in her nebulizer. She was advised to cease smoking completely.  She is to continue with current bronchodilator therapies to include Breztri and as needed albuterol    2. Health Maintenance.  She has received her flu and pneumonia vaccines. The RSV vaccine will be sent to her pharmacy as she is expecting two newborns in a month.    Follow-up  Return in 3 months for follow up.         Follow Up   Return in about 3 months (around 3/9/2025).  Patient was given instructions and counseling regarding her condition or for health maintenance advice. Please see specific information pulled into the AVS if appropriate.         Patient or patient representative verbalized consent for the use of Ambient Listening during the visit with  Erik Caicedo DO for chart documentation. 12/9/2024  12:01 EST

## 2024-12-14 LAB
QT INTERVAL: 393 MS
QTC INTERVAL: 438 MS

## 2024-12-24 DIAGNOSIS — E78.2 MIXED HYPERLIPIDEMIA: ICD-10-CM

## 2024-12-26 ENCOUNTER — OFFICE VISIT (OUTPATIENT)
Dept: INTERNAL MEDICINE | Facility: CLINIC | Age: 64
End: 2024-12-26
Payer: MEDICARE

## 2024-12-26 VITALS
BODY MASS INDEX: 16.23 KG/M2 | HEART RATE: 75 BPM | WEIGHT: 91.6 LBS | SYSTOLIC BLOOD PRESSURE: 110 MMHG | OXYGEN SATURATION: 95 % | TEMPERATURE: 98.4 F | HEIGHT: 63 IN | DIASTOLIC BLOOD PRESSURE: 66 MMHG

## 2024-12-26 DIAGNOSIS — R05.1 ACUTE COUGH: ICD-10-CM

## 2024-12-26 DIAGNOSIS — F41.1 GAD (GENERALIZED ANXIETY DISORDER): Chronic | ICD-10-CM

## 2024-12-26 DIAGNOSIS — J44.1 CHRONIC OBSTRUCTIVE PULMONARY DISEASE WITH ACUTE EXACERBATION: Primary | ICD-10-CM

## 2024-12-26 LAB
EXPIRATION DATE: NORMAL
EXPIRATION DATE: NORMAL
FLUAV AG UPPER RESP QL IA.RAPID: NOT DETECTED
FLUBV AG UPPER RESP QL IA.RAPID: NOT DETECTED
INTERNAL CONTROL: NORMAL
INTERNAL CONTROL: NORMAL
Lab: NORMAL
Lab: NORMAL
S PYO AG THROAT QL: NEGATIVE
SARS-COV-2 AG UPPER RESP QL IA.RAPID: NOT DETECTED

## 2024-12-26 PROCEDURE — 1125F AMNT PAIN NOTED PAIN PRSNT: CPT | Performed by: STUDENT IN AN ORGANIZED HEALTH CARE EDUCATION/TRAINING PROGRAM

## 2024-12-26 PROCEDURE — 3074F SYST BP LT 130 MM HG: CPT | Performed by: STUDENT IN AN ORGANIZED HEALTH CARE EDUCATION/TRAINING PROGRAM

## 2024-12-26 PROCEDURE — 99213 OFFICE O/P EST LOW 20 MIN: CPT | Performed by: STUDENT IN AN ORGANIZED HEALTH CARE EDUCATION/TRAINING PROGRAM

## 2024-12-26 PROCEDURE — 87880 STREP A ASSAY W/OPTIC: CPT | Performed by: STUDENT IN AN ORGANIZED HEALTH CARE EDUCATION/TRAINING PROGRAM

## 2024-12-26 PROCEDURE — 3078F DIAST BP <80 MM HG: CPT | Performed by: STUDENT IN AN ORGANIZED HEALTH CARE EDUCATION/TRAINING PROGRAM

## 2024-12-26 PROCEDURE — 87081 CULTURE SCREEN ONLY: CPT | Performed by: STUDENT IN AN ORGANIZED HEALTH CARE EDUCATION/TRAINING PROGRAM

## 2024-12-26 PROCEDURE — 87428 SARSCOV & INF VIR A&B AG IA: CPT | Performed by: STUDENT IN AN ORGANIZED HEALTH CARE EDUCATION/TRAINING PROGRAM

## 2024-12-26 RX ORDER — BUSPIRONE HYDROCHLORIDE 15 MG/1
15 TABLET ORAL 3 TIMES DAILY
Qty: 90 TABLET | Refills: 1 | Status: SHIPPED | OUTPATIENT
Start: 2024-12-26

## 2024-12-26 RX ORDER — LOSARTAN POTASSIUM 100 MG/1
100 TABLET ORAL DAILY
Qty: 90 TABLET | Refills: 0 | Status: SHIPPED | OUTPATIENT
Start: 2024-12-26

## 2024-12-26 RX ORDER — ATORVASTATIN CALCIUM 10 MG/1
10 TABLET, FILM COATED ORAL DAILY
Qty: 90 TABLET | Refills: 0 | Status: SHIPPED | OUTPATIENT
Start: 2024-12-26

## 2024-12-26 RX ORDER — MEGESTROL ACETATE 20 MG/1
20 TABLET ORAL DAILY
Qty: 90 TABLET | Refills: 1 | Status: SHIPPED | OUTPATIENT
Start: 2024-12-26

## 2024-12-26 RX ORDER — PREDNISONE 20 MG/1
40 TABLET ORAL DAILY
Qty: 10 TABLET | Refills: 0 | Status: SHIPPED | OUTPATIENT
Start: 2024-12-26 | End: 2024-12-31

## 2024-12-26 NOTE — PROGRESS NOTES
"Chief Complaint  Cough and Nasal Congestion    Subjective          Cindy Bella presents to [facility]  History of Present Illness    Here for a sick visit.  Here with complaints of 2 days of worsening cough productive of clear sputum.  Using mucinex and benadryl.  Using 2L oxygen at night and at times during the day (when pulse ox desats to 93-94%).  Using nebulizer \"like crazy.\"  Still hasn't received roflumilast, but compliant with bretzri.  Still smoking 2 cigarettes a day.      Current Outpatient Medications   Medication Instructions    albuterol (PROVENTIL) 2.5 mg, Nebulization, 4 Times Daily PRN    albuterol sulfate  (90 Base) MCG/ACT inhaler 2 puffs, Inhalation, Every 4 Hours PRN    amLODIPine (NORVASC) 2.5 mg, Daily    aspirin (ASPIRIN LOW DOSE) 81 mg, Oral, Daily    atorvastatin (LIPITOR) 10 mg, Oral, Daily    Budeson-Glycopyrrol-Formoterol (Breztri Aerosphere) 160-9-4.8 MCG/ACT aerosol inhaler 2 puffs, Inhalation, 2 Times Daily    busPIRone (BUSPAR) 10 mg, 2 Times Daily    busPIRone (BUSPAR) 15 mg, Oral, 3 Times Daily    citalopram (CELEXA) 40 mg, Oral, Daily    hydroCHLOROthiazide 25 mg, Oral, Daily    losartan (COZAAR) 100 mg, Oral, Daily    megestrol (MEGACE) 20 mg, Oral, Daily    methocarbamol (ROBAXIN) 750 mg, Oral, 3 Times Daily    omeprazole (PRILOSEC) 20 mg, Oral, 2 Times Daily    predniSONE (DELTASONE) 40 mg, Oral, Daily    propranolol (INDERAL) 80 MG tablet TAKE ONE TABLET BY MOUTH TWICE DAILY @9AM-5PM    roflumilast (DALIRESP) 250 mcg, Oral, Daily    roflumilast (DALIRESP) 500 mcg, Oral, Daily    traZODone (DESYREL) 100 mg, Oral, Every Night at Bedtime    tretinoin (Retin-A) 0.1 % cream 1 Application, Topical, Nightly       The following portions of the patient's history were reviewed and updated as appropriate: allergies, current medications, past family history, past medical history, past social history, past surgical history, and " "problem list.    Objective   Vital Signs:   /66 (BP Location: Left arm, Patient Position: Sitting, Cuff Size: Small Adult)   Pulse 75   Temp 98.4 °F (36.9 °C) (Temporal)   Ht 160 cm (63\")   Wt 41.5 kg (91 lb 9.6 oz)   SpO2 95%   BMI 16.23 kg/m²     BP Readings from Last 3 Encounters:   12/26/24 110/66   12/09/24 107/70   11/30/24 126/56     Wt Readings from Last 3 Encounters:   12/26/24 41.5 kg (91 lb 9.6 oz)   12/09/24 42.1 kg (92 lb 12.8 oz)   11/29/24 44.4 kg (97 lb 14.2 oz)           Physical Exam  Vitals and nursing note reviewed.   Constitutional:       Appearance: Normal appearance. She is well-developed.   HENT:      Head: Normocephalic and atraumatic.      Right Ear: External ear normal.      Left Ear: External ear normal.      Mouth/Throat:      Mouth: Mucous membranes are moist.      Pharynx: Oropharynx is clear. No oropharyngeal exudate or posterior oropharyngeal erythema.   Eyes:      Conjunctiva/sclera: Conjunctivae normal.   Cardiovascular:      Rate and Rhythm: Normal rate and regular rhythm.      Pulses: Normal pulses.      Heart sounds: Normal heart sounds. No murmur heard.     No friction rub. No gallop.   Pulmonary:      Effort: Pulmonary effort is normal.      Breath sounds: Wheezing present. No rhonchi.   Abdominal:      General: Bowel sounds are normal. There is no distension.      Palpations: Abdomen is soft.      Tenderness: There is abdominal tenderness. There is no guarding or rebound.      Comments: Mild epigastric TTP without guarding or rebound   Skin:     General: Skin is warm and dry.   Neurological:      Mental Status: She is alert and oriented to person, place, and time. Mental status is at baseline.      Sensory: No sensory deficit.   Psychiatric:         Mood and Affect: Affect normal.         Behavior: Behavior normal.         Thought Content: Thought content normal.         Judgment: Judgment normal.        Result Review :   The following data was reviewed by: Kendrick" MD Adrian on 12/26/2024:  Common labs          4/23/2024    09:26 11/29/2024    14:21 11/30/2024    04:18   Common Labs   Glucose 85  102  128    BUN 9  8  13    Creatinine 0.74  0.73  0.59    Sodium 140  140  136    Potassium 4.2  3.6  3.2    Chloride 103  101  99    Calcium 10.6  10.1  9.7    Albumin 4.2  4.4     Total Bilirubin 0.6  0.6     Alkaline Phosphatase 75  86     AST (SGOT) 23  20     ALT (SGPT) 16  13     WBC 9.40  9.33  6.63    Hemoglobin 15.0  14.5  13.4    Hematocrit 44.5  43.4  40.1    Platelets 269  240  223    Total Cholesterol 170      Triglycerides 138      HDL Cholesterol 49      LDL Cholesterol  97               Lab Results   Component Value Date    SARSANTIGEN Not Detected 12/26/2024    COVID19 Not Detected 11/30/2024    FLUAAG Not Detected 12/26/2024    FLUBAG Not Detected 12/26/2024    RAPSCRN Negative 12/26/2024    RSV Not Detected 11/30/2024        Assessment and Plan    Diagnoses and all orders for this visit:    1. Chronic obstructive pulmonary disease with acute exacerbation (Primary)  -     predniSONE (DELTASONE) 20 MG tablet; Take 2 tablets by mouth Daily for 5 days.  Dispense: 10 tablet; Refill: 0    2. Acute cough  -     Beta Strep Culture, Throat - Swab, Throat  -     POC Rapid Strep A  -     POCT SARS-CoV-2 + Flu Antigen JONG      -counseled that in my opinion benefits of steroids outweighed the risk of elevated BP and headaches  -still waiting for roflumilast to be delivered  -if worsening cough or hypoxia in spite of [place] oxygen, would need to be evaluated in ER  -counseled today on the health risks of tobacco use  -strongly counseled today on the importance of tobacco cessation    There are no discontinued medications.       Follow Up   Return in 6 weeks (on 2/6/2025) for Medicare Wellness.  Patient was given instructions and counseling regarding her condition or for health maintenance advice. Please see specific information pulled into the AVS if appropriate.       Kendrick  MD Adrian  12/26/24  15:10 EST

## 2024-12-28 LAB — BACTERIA SPEC AEROBE CULT: NORMAL

## 2025-01-25 DIAGNOSIS — F17.210 CIGARETTE NICOTINE DEPENDENCE WITHOUT COMPLICATION: ICD-10-CM

## 2025-01-25 DIAGNOSIS — I10 ESSENTIAL HYPERTENSION: ICD-10-CM

## 2025-01-25 DIAGNOSIS — K21.9 GASTROESOPHAGEAL REFLUX DISEASE, UNSPECIFIED WHETHER ESOPHAGITIS PRESENT: ICD-10-CM

## 2025-01-27 RX ORDER — BUDESONIDE, GLYCOPYRROLATE, AND FORMOTEROL FUMARATE 160; 9; 4.8 UG/1; UG/1; UG/1
2 AEROSOL, METERED RESPIRATORY (INHALATION) 2 TIMES DAILY
Qty: 32.1 G | Refills: 1 | Status: SHIPPED | OUTPATIENT
Start: 2025-01-27

## 2025-01-27 RX ORDER — HYDROCHLOROTHIAZIDE 25 MG/1
25 TABLET ORAL DAILY
Qty: 90 TABLET | Refills: 0 | Status: SHIPPED | OUTPATIENT
Start: 2025-01-27

## 2025-02-20 DIAGNOSIS — F41.1 GAD (GENERALIZED ANXIETY DISORDER): Chronic | ICD-10-CM

## 2025-02-20 RX ORDER — BUSPIRONE HYDROCHLORIDE 15 MG/1
15 TABLET ORAL 3 TIMES DAILY
Qty: 90 TABLET | Refills: 11 | Status: SHIPPED | OUTPATIENT
Start: 2025-02-20

## 2025-02-24 RX ORDER — AMLODIPINE BESYLATE 2.5 MG/1
2.5 TABLET ORAL DAILY
OUTPATIENT
Start: 2025-02-24

## 2025-02-24 NOTE — TELEPHONE ENCOUNTER
[place]er: [facility] [place] - [place], [place] - [address] - [phone] [facility] [phone] FX    Relationship: Pharmacy    Best call back number: [phone]     Requested Prescriptions:   Requested Prescriptions     Pending Prescriptions Disp Refills    amLODIPine (NORVASC) 2.5 MG tablet       Sig: Take 1 tablet by mouth Daily.        Pharmacy where request should be sent: [facility] - [place], [place] - [address] - [phone] [facility] [phone] FX     Last office visit with prescribing clinician: 10/22/2024   Last telemedicine visit with prescribing clinician: Visit date not found   Next office visit with prescribing clinician: Visit date not found     Additional details provided by patient: CALLER STATES PATIENT NEEDS NEW PRESCRIPTION. PLEASE ADVISE.    Does the patient have less than a 3 day supply:  [] Yes  [] No    Would you like a call back once the refill request has been completed: [] Yes [] No    If the office needs to give you a call back, can they leave a voicemail: [] Yes [] No    Tiffanie Gómez Rep   02/24/25 14:46 EST

## 2025-02-25 ENCOUNTER — HOSPITAL ENCOUNTER (OUTPATIENT)
Dept: CT IMAGING | Facility: HOSPITAL | Age: 65
Discharge: HOME | End: 2025-02-25
Admitting: NURSE PRACTITIONER
Payer: MEDICARE

## 2025-02-25 DIAGNOSIS — F17.210 CIGARETTE NICOTINE DEPENDENCE WITHOUT COMPLICATION: ICD-10-CM

## 2025-02-25 PROCEDURE — 71271 CT THORAX LUNG CANCER SCR C-: CPT

## 2025-02-27 DIAGNOSIS — F17.209 NICOTINE DEPENDENCE WITH NICOTINE-INDUCED DISORDER, UNSPECIFIED NICOTINE PRODUCT TYPE: Primary | ICD-10-CM

## 2025-02-27 DIAGNOSIS — Z87.891 PERSONAL HISTORY OF NICOTINE DEPENDENCE: ICD-10-CM

## 2025-03-18 ENCOUNTER — TELEPHONE (OUTPATIENT)
Dept: INTERNAL MEDICINE | Facility: CLINIC | Age: 65
End: 2025-03-18

## 2025-03-18 ENCOUNTER — TELEPHONE (OUTPATIENT)
Dept: PULMONOLOGY | Facility: CLINIC | Age: 65
End: 2025-03-18

## 2025-03-18 NOTE — TELEPHONE ENCOUNTER
"    Caller: Cindy Bella \"Bridgette\"    Relationship: Self    Best call back number: [phone]     What medication are you requesting: STERIOD    What are your current symptoms: SOA AND COUGHING UP CLEAR MUCUS. PT SAYS THAT SHE IS HAVING A COPD FLARE UP     How long have you been experiencing symptoms:     Have you had these symptoms before:    [x] Yes  [] No    Have you been treated for these symptoms before:   [x] Yes  [] No    If a prescription is needed, what is your preferred pharmacy and phone number:    [id number] [place] - [address] AT [facility] [phone] [facility] [phone]      Additional notes: PLEASE CALL PT AND LET HER KNOW THE STATUS OF THE REQUEST        "

## 2025-03-18 NOTE — TELEPHONE ENCOUNTER
"Caller: Cindy Bella \"Bridgette\"    Relationship: Self    Best call back number: [phone]     What medication are you requesting: PREDNISONE    What are your current symptoms: ASTHMA FLARE UP    How long have you been experiencing symptoms: TWO DAYS    Have you had these symptoms before:    [x] Yes  [] No    Have you been treated for these symptoms before:   [x] Yes  [] No    If a prescription is needed, what is your preferred pharmacy and phone number: [facility] #[id number] - [place], [place] - [address] AT [facility] [phone] [facility] [phone] FX   "

## 2025-03-19 NOTE — TELEPHONE ENCOUNTER
Attempted to contact patient. Could not leave message due to no voicemail.    HUB ok to transfer to office.

## 2025-03-20 ENCOUNTER — TELEMEDICINE (OUTPATIENT)
Dept: INTERNAL MEDICINE | Facility: CLINIC | Age: 65
End: 2025-03-20
Payer: MEDICARE

## 2025-03-20 DIAGNOSIS — J44.1 COPD WITH ACUTE EXACERBATION: ICD-10-CM

## 2025-03-20 DIAGNOSIS — E78.2 MIXED HYPERLIPIDEMIA: ICD-10-CM

## 2025-03-20 DIAGNOSIS — F41.1 GAD (GENERALIZED ANXIETY DISORDER): Chronic | ICD-10-CM

## 2025-03-20 DIAGNOSIS — Z00.00 MEDICARE ANNUAL WELLNESS VISIT, SUBSEQUENT: Primary | ICD-10-CM

## 2025-03-20 RX ORDER — BROMPHENIRAMINE MALEATE, PSEUDOEPHEDRINE HYDROCHLORIDE, AND DEXTROMETHORPHAN HYDROBROMIDE 2; 30; 10 MG/5ML; MG/5ML; MG/5ML
10 SYRUP ORAL 4 TIMES DAILY PRN
Qty: 473 ML | Refills: 0 | Status: SHIPPED | OUTPATIENT
Start: 2025-03-20

## 2025-03-20 RX ORDER — METHYLPREDNISOLONE 4 MG/1
TABLET ORAL
Qty: 21 TABLET | Refills: 0 | Status: SHIPPED | OUTPATIENT
Start: 2025-03-20

## 2025-03-20 RX ORDER — ATORVASTATIN CALCIUM 10 MG/1
10 TABLET, FILM COATED ORAL DAILY
Qty: 90 TABLET | Refills: 0 | Status: SHIPPED | OUTPATIENT
Start: 2025-03-20

## 2025-03-20 NOTE — PROGRESS NOTES
Subjective   The ABCs of the Annual Wellness Visit  Medicare Wellness Visit      Cindy Bella is a 64 y.o. patient who presents for a Medicare Wellness Visit.    The following portions of the patient's history were reviewed and   updated as appropriate: allergies, current medications, past family history, past medical history, past social history, past surgical history, and problem list.    Compared to one year ago, the patient's physical   health is worse.  Compared to one year ago, the patient's mental   health is better.    Recent Hospitalizations:  This patient has had a [facility] admission record on file within the last 365 days.  Current Medical Providers:  Patient Care Team:  Alivia Burgess APRN as PCP - General (Internal Medicine & Pediatrics)  Cynthia Ma APRN as Nurse Practitioner (Pulmonary Disease)    Outpatient Medications Prior to Visit   Medication Sig Dispense Refill    albuterol (PROVENTIL) (2.5 MG/3ML) 0.083% nebulizer solution Take 2.5 mg by nebulization 4 (Four) Times a Day As Needed for Wheezing. 120 each 11    albuterol sulfate  (90 Base) MCG/ACT inhaler Inhale 2 puffs Every 4 (Four) Hours As Needed for Wheezing. 20.1 g 3    amLODIPine (NORVASC) 2.5 MG tablet Take 1 tablet by mouth Daily.      aspirin (Aspirin Low Dose) 81 MG EC tablet Take 1 tablet by mouth Daily. 90 tablet 11    atorvastatin (LIPITOR) 10 MG tablet TAKE ONE TABLET BY MOUTH DAILY 90 tablet 0    busPIRone (BUSPAR) 10 MG tablet Take 1 tablet by mouth 2 (Two) Times a Day.      busPIRone (BUSPAR) 15 MG tablet TAKE ONE TABLET BY MOUTH THREE TIMES DAILY 90 tablet 11    citalopram (CeleXA) 40 MG tablet Take 1 tablet by mouth Daily. 90 tablet 1    hydroCHLOROthiazide 25 MG tablet TAKE ONE TABLET BY MOUTH DAILY 90 tablet 0    losartan (COZAAR) 100 MG tablet TAKE ONE TABLET BY MOUTH DAILY 90 tablet 0    megestrol (MEGACE) 20 MG tablet TAKE ONE TABLET BY MOUTH DAILY 90 tablet 1    methocarbamol (ROBAXIN) 750  MG tablet TAKE ONE TABLET BY MOUTH THREE TIMES DAILY 90 tablet 11    omeprazole (priLOSEC) 20 MG capsule TAKE ONE CAPSULE BY MOUTH TWICE DAILY 180 capsule 1    propranolol (INDERAL) 80 MG tablet TAKE ONE TABLET BY MOUTH TWICE DAILY @9AM-5PM (Patient taking differently: Take 1 tablet by mouth 2 (Two) Times a Day.) 180 tablet 11    roflumilast (Daliresp) 250 MCG tablet tablet Take 1 tablet by mouth Daily. (Patient not taking: Reported on 12/26/2024) 30 tablet 0    roflumilast (Daliresp) 500 MCG tablet tablet Take 1 tablet by mouth Daily. (Patient not taking: Reported on 12/26/2024) 30 tablet 11    traZODone (DESYREL) 100 MG tablet TAKE ONE TABLET BY MOUTH EVERY NIGHT 90 tablet 11    tretinoin (Retin-A) 0.1 % cream Apply 1 Application topically to the appropriate area as directed Every Night. 45 g 2    Breztri Aerosphere 160-9-4.8 MCG/ACT aerosol inhaler INHALE TWO PUFFS BY MOUTH TWICE DAILY 32.1 g 1     No facility-administered medications prior to visit.     No opioid medication identified on active medication list. I have reviewed chart for other potential  high risk medication/s and harmful drug interactions in the elderly.      Aspirin is on active medication list. Aspirin use is not indicated based on review of current medical condition/s. Risk of harm outweighs potential benefits. Patient instructed to discontinue this medication.  .      Patient Active Problem List   Diagnosis    COPD with acute exacerbation    Mixed hyperlipidemia    Essential hypertension    Anxiety    Arthritis    Depression    Idiopathic scoliosis and kyphoscoliosis    Scoliosis    SVT (supraventricular tachycardia)    Urethral stricture    Chronic low back pain without sciatica    Nicotine dependence    Insomnia    Gastroesophageal reflux disease    Diarrhea    Fecal urgency    CHRISS (generalized anxiety disorder)    Muscle spasm    Acute respiratory failure with hypoxia     Advance Care Planning Advance Directive is not on file.  ACP  "discussion was held with the patient during this visit. Patient does not have an advance directive, information provided.            Objective   There were no vitals filed for this visit.    Estimated body mass index is 16.23 kg/m² as calculated from the following:    Height as of 12/26/24: 160 cm (63\").    Weight as of 12/26/24: 41.5 kg (91 lb 9.6 oz).    BMI is below normal parameters (malnutrition). Recommendations: treating the underlying disease process           Does the patient have evidence of cognitive impairment? No                                                                                                Health  Risk Assessment    Smoking Status:  Social History     Tobacco Use   Smoking Status Every Day    Current packs/day: 0.25    Average packs/day: 1 pack/day for 50.8 years (50.6 ttl pk-yrs)    Types: Cigarettes    Start date: 6/1/1974    Passive exposure: Current   Smokeless Tobacco Never   Tobacco Comments    States maybe 2 cigarettes a day     Alcohol Consumption:  Social History     Substance and Sexual Activity   Alcohol Use Never       Fall Risk Screen  STEADI Fall Risk Assessment was completed, and patient is at MODERATE risk for falls. Assessment completed on:3/20/2025    Depression Screening   Little interest or pleasure in doing things? Not at all   Feeling down, depressed, or hopeless? Not at all   PHQ-2 Total Score 0      Health Habits and Functional and Cognitive Screening:      3/20/2025    10:00 AM   Functional & Cognitive Status   Do you have difficulty preparing food and eating? No   Do you have difficulty bathing yourself, getting dressed or grooming yourself? No   Do you have difficulty using the toilet? No   Do you have difficulty moving around from place to place? Yes   Do you have trouble with steps or getting out of a bed or a chair? Yes   Current Diet Well Balanced Diet   Dental Exam Not up to date   Eye Exam Up to date   Exercise (times per week) 0 times per week   Current " Exercises Include No Regular Exercise   Do you need help using the phone?  No   Are you deaf or do you have serious difficulty hearing?  Yes   Do you need help to go to places out of walking distance? Yes   Do you need help shopping? No   Do you need help preparing meals?  No   Do you need help with housework?  No   Do you need help with laundry? No   Do you need help taking your medications? No   Do you need help managing money? No   Do you ever drive or ride in a car without wearing a seat belt? No   Have you felt unusual stress, anger or loneliness in the last month? Yes   Who do you live with? Spouse   If you need help, do you have trouble finding someone available to you? No   Have you been bothered in the last four weeks by sexual problems? No   Do you have difficulty concentrating, remembering or making decisions? Yes           Age-appropriate Screening Schedule:  Refer to the list below for future screening recommendations based on patient's age, sex and/or medical conditions. Orders for these recommended tests are listed in the plan section. The patient has been provided with a written plan.    Health Maintenance List  Health Maintenance   Topic Date Due    DXA SCAN  Never done    TDAP/TD VACCINES (1 - Tdap) Never done    ZOSTER VACCINE (1 of 2) Never done    COVID-19 Vaccine (2 - 2024-25 season) 09/01/2024    ANNUAL WELLNESS VISIT  10/30/2024    LIPID PANEL  04/23/2025    MAMMOGRAM  08/04/2025    BMI FOLLOWUP  12/26/2025    LUNG CANCER SCREENING  02/25/2026    COLORECTAL CANCER SCREENING  10/01/2030    HEPATITIS C SCREENING  Completed    INFLUENZA VACCINE  Completed    Pneumococcal Vaccine 50+  Completed                                                                                                                                                CMS Preventative Services Quick Reference  Risk Factors Identified During Encounter  Fall Risk-High or Moderate: Discussed Fall Prevention in the  home  Inactivity/Sedentary: Patient was advised to exercise at least 150 minutes a week per CDC recommendations.  Dental Screening Recommended  Vision Screening Recommended    The above risks/problems have been discussed with the patient.  Pertinent information has been shared with the patient in the After Visit Summary.  An After Visit Summary and PPPS were made available to the patient.    Follow Up:   Next Medicare Wellness visit to be scheduled in 1 year.          Additional E&M Note during same encounter follows:  Patient has multiple medical problems which are significant and separately identifiable that require additional work above and beyond the Medicare Wellness Visit.      Chief Complaint  No chief complaint on file.    Cindy Bella is a 64 y.o. female who presents to [facility]       Objective   Vital Signs:   There were no vitals filed for this visit.    Wt Readings from Last 3 Encounters:   12/26/24 41.5 kg (91 lb 9.6 oz)   12/09/24 42.1 kg (92 lb 12.8 oz)   11/29/24 44.4 kg (97 lb 14.2 oz)     BP Readings from Last 3 Encounters:   12/26/24 110/66   12/09/24 107/70   11/30/24 126/56       Physical Exam    Appearance: No acute distress, well-nourished  Head: normocephalic, atraumatic  Eyes: extraocular movements intact, no scleral icterus, no conjunctival injection  Ears, Nose, and Throat: external ears normal, nares patent, moist mucous membranes  Cardiovascular: regular rate and rhythm. no murmurs, rubs, or gallops. no edema  Respiratory: breathing comfortably, symmetric chest rise, clear to auscultation bilaterally. No wheezes, rales, or rhonchi.  Neuro: alert and oriented to time, place, and person. Normal gait  Psych: normal mood and affect     The following data was reviewed by BROOKLYN Guillaume on 03/20/2025        Assessment & Plan   Diagnoses and all orders for this visit:    1. Medicare annual wellness visit, subsequent  (Primary)    2. COPD with acute exacerbation  -     Fluticasone-Umeclidin-Vilant (TRELEGY ELLIPTA) 200-62.5-25 MCG/ACT inhaler; Inhale 1 puff Daily.  Dispense: 60 each; Refill: 3  -     methylPREDNISolone (MEDROL) 4 MG dose pack; Take as directed on package instructions.  Dispense: 21 tablet; Refill: 0  -     brompheniramine-pseudoephedrine-DM 30-2-10 MG/5ML syrup; Take 10 mL by mouth 4 (Four) Times a Day As Needed for Cough or Congestion.  Dispense: 473 mL; Refill: 0          BMI is below normal parameters (malnutrition). Recommendations: treating the underlying disease process       FOLLOW UP  No follow-ups on file.  Patient was given instructions and counseling regarding her condition or for health maintenance advice. Please see specific information pulled into the AVS if appropriate.     Alivia Burgess, APRDARLENE  03/20/25  10:12 EDT

## 2025-03-20 NOTE — PROGRESS NOTES
Mode of Visit: Video via [employer]  Location of patient: home  Physician's location is at clinic ([facility] Pediatric[facility] clinic at [address] [place] [address], [place] [address] [address]).  You have chosen to receive care through a telehealth visit.  The patient has signed the video visit consent form.  The visit included audio and video interaction.      Chief Complaint  No chief complaint on file.    Subjective          Cindy Bella presents to [facility]    COPD  She complains of cough, shortness of breath and sputum production. There is no chest tightness, difficulty breathing, frequent throat clearing, hemoptysis, hoarse voice or wheezing. This is a new problem. The current episode started yesterday. The cough is non-productive. Pertinent negatives include no appetite change, chest pain, dyspnea on exertion, ear congestion, ear pain, fever, headaches, heartburn, malaise/fatigue, myalgias, nasal congestion, orthopnea, PND, postnasal drip, rhinorrhea, sneezing, sore throat, sweats, trouble swallowing or weight loss.       Oxygen at home 93% and HR83  Current Outpatient Medications   Medication Instructions    albuterol (PROVENTIL) 2.5 mg, Nebulization, 4 Times Daily PRN    albuterol sulfate  (90 Base) MCG/ACT inhaler 2 puffs, Inhalation, Every 4 Hours PRN    amLODIPine (NORVASC) 2.5 mg, Daily    aspirin (ASPIRIN LOW DOSE) 81 mg, Oral, Daily    atorvastatin (LIPITOR) 10 mg, Oral, Daily    brompheniramine-pseudoephedrine-DM 30-2-10 MG/5ML syrup 10 mL, Oral, 4 Times Daily PRN    busPIRone (BUSPAR) 10 mg, 2 Times Daily    busPIRone (BUSPAR) 15 mg, Oral, 3 Times Daily    citalopram (CELEXA) 40 mg, Oral, Daily    Fluticasone-Umeclidin-Vilant (TRELEGY ELLIPTA) 200-62.5-25 MCG/ACT inhaler 1 puff, Inhalation, Daily - RT    hydroCHLOROthiazide 25 mg, Oral, Daily    losartan (COZAAR) 100 mg, Oral, Daily    megestrol (MEGACE) 20 mg, Oral, Daily    methocarbamol  (ROBAXIN) 750 mg, Oral, 3 Times Daily    methylPREDNISolone (MEDROL) 4 MG dose pack Take as directed on package instructions.    omeprazole (PRILOSEC) 20 mg, Oral, 2 Times Daily    propranolol (INDERAL) 80 MG tablet TAKE ONE TABLET BY MOUTH TWICE DAILY @9AM-5PM    roflumilast (DALIRESP) 250 mcg, Oral, Daily    roflumilast (DALIRESP) 500 mcg, Oral, Daily    traZODone (DESYREL) 100 mg, Oral, Every Night at Bedtime    tretinoin (Retin-A) 0.1 % cream 1 Application, Topical, Nightly       The following portions of the patient's history were reviewed and updated as appropriate: allergies, current medications, past family history, past medical history, past social history, past surgical history, and problem list.    Objective   Vital Signs:   There were no vitals taken for this visit.    Wt Readings from Last 3 Encounters:   12/26/24 41.5 kg (91 lb 9.6 oz)   12/09/24 42.1 kg (92 lb 12.8 oz)   11/29/24 44.4 kg (97 lb 14.2 oz)     BP Readings from Last 3 Encounters:   12/26/24 110/66   12/09/24 107/70   11/30/24 126/56       Physical Exam   Virtual Visit Physical Exam  Gen: well-nourished, no acute distress  HENT: atraumatic, normocephalic  Eyes: extraocular movements intact, no scleral icterus  Lung: breathing comfortably, no cough  Neuro: grossly oriented to person, place, and time. no facial droop   Psych: normal mood and affect    Result Review :     Common labs          4/23/2024    09:26 11/29/2024    14:21 11/30/2024    04:18   Common Labs   Glucose 85  102  128    BUN 9  8  13    Creatinine 0.74  0.73  0.59    Sodium 140  140  136    Potassium 4.2  3.6  3.2    Chloride 103  101  99    Calcium 10.6  10.1  9.7    Albumin 4.2  4.4     Total Bilirubin 0.6  0.6     Alkaline Phosphatase 75  86     AST (SGOT) 23  20     ALT (SGPT) 16  13     WBC 9.40  9.33  6.63    Hemoglobin 15.0  14.5  13.4    Hematocrit 44.5  43.4  40.1    Platelets 269  240  223    Total Cholesterol 170      Triglycerides 138      HDL Cholesterol 49       LDL Cholesterol  97                  Assessment and Plan    Diagnoses and all orders for this visit:    Diagnoses and all orders for this visit:    1. Medicare annual wellness visit, subsequent (Primary)    2. COPD with acute exacerbation  -     Fluticasone-Umeclidin-Vilant (TRELEGY ELLIPTA) 200-62.5-25 MCG/ACT inhaler; Inhale 1 puff Daily.  Dispense: 60 each; Refill: 3  -     methylPREDNISolone (MEDROL) 4 MG dose pack; Take as directed on package instructions.  Dispense: 21 tablet; Refill: 0  -     brompheniramine-pseudoephedrine-DM 30-2-10 MG/5ML syrup; Take 10 mL by mouth 4 (Four) Times a Day As Needed for Cough or Congestion.  Dispense: 473 mL; Refill: 0    3. CHRISS (generalized anxiety disorder)  Comments:  well controlled    4. Mixed hyperlipidemia  -     atorvastatin (LIPITOR) 10 MG tablet; Take 1 tablet by mouth Daily.  Dispense: 90 tablet; Refill: 0        Medications Discontinued During This Encounter   Medication Reason    Breztri Aerosphere 160-9-4.8 MCG/ACT aerosol inhaler     atorvastatin (LIPITOR) 10 MG tablet Reorder          Follow Up   No follow-ups on file.  Patient was given instructions and counseling regarding his condition or for health maintenance advice. Please see specific information pulled into the AVS if appropriate.

## 2025-03-31 RX ORDER — AMLODIPINE BESYLATE 2.5 MG/1
2.5 TABLET ORAL DAILY
Status: CANCELLED | OUTPATIENT
Start: 2025-03-31

## 2025-03-31 NOTE — TELEPHONE ENCOUNTER
[place]er: [facility] - [place], [place] - [address] [facility] [phone] [facility] [phone] FX    Relationship: Pharmacy    Best call back number:      Requested Prescriptions:   Requested Prescriptions     Pending Prescriptions Disp Refills    amLODIPine (NORVASC) 2.5 MG tablet       Sig: Take 1 tablet by mouth Daily.        Pharmacy where request should be sent: [facility] - [place], [place] - [address] [facility] - [phone] [facility] [phone] FX     Last office visit with prescribing clinician: 10/22/2024   Last telemedicine visit with prescribing clinician: 3/20/2025   Next office visit with prescribing clinician: Visit date not found     Additional details provided by patient:             Tiffanie Salinas Rep   03/31/25 10:22 EDT

## 2025-04-16 ENCOUNTER — PRIOR AUTHORIZATION (OUTPATIENT)
Dept: INTERNAL MEDICINE | Facility: CLINIC | Age: 65
End: 2025-04-16
Payer: MEDICARE

## 2025-04-16 NOTE — TELEPHONE ENCOUNTER
PA STARTED ON FOLLOWING MEDICATION:     tretinoin (Retin-A) 0.1 % cream (11/18/2024)     PER [employer]OVER[employer]MEDS:   Available without authorization. The member is able to fill the requested drug at the pharmacy. If coverage is still needed or requesting prior to the expiration of a current authorization, a request can be made by sending a fax or calling the number on the back of the member's ID card.

## 2025-04-24 DIAGNOSIS — F41.9 ANXIETY: ICD-10-CM

## 2025-04-24 DIAGNOSIS — I10 ESSENTIAL HYPERTENSION: ICD-10-CM

## 2025-04-24 DIAGNOSIS — E78.2 MIXED HYPERLIPIDEMIA: ICD-10-CM

## 2025-04-24 RX ORDER — HYDROCHLOROTHIAZIDE 25 MG/1
25 TABLET ORAL DAILY
Qty: 90 TABLET | Refills: 11 | OUTPATIENT
Start: 2025-04-24

## 2025-04-24 RX ORDER — ATORVASTATIN CALCIUM 10 MG/1
10 TABLET, FILM COATED ORAL DAILY
Qty: 90 TABLET | Refills: 11 | OUTPATIENT
Start: 2025-04-24

## 2025-04-24 RX ORDER — CITALOPRAM HYDROBROMIDE 40 MG/1
40 TABLET ORAL DAILY
Qty: 90 TABLET | Refills: 11 | OUTPATIENT
Start: 2025-04-24

## 2025-04-24 RX ORDER — LOSARTAN POTASSIUM 100 MG/1
100 TABLET ORAL DAILY
Qty: 90 TABLET | Refills: 11 | OUTPATIENT
Start: 2025-04-24

## 2025-04-25 ENCOUNTER — EXTERNAL PBMM DATA (OUTPATIENT)
Dept: PHARMACY | Facility: OTHER | Age: 65
End: 2025-04-25
Payer: MEDICARE

## 2025-05-05 RX ORDER — ALBUTEROL SULFATE 90 UG/1
2 INHALANT RESPIRATORY (INHALATION) EVERY 4 HOURS PRN
Qty: 20.1 G | Refills: 3 | Status: SHIPPED | OUTPATIENT
Start: 2025-05-05

## 2025-05-12 DIAGNOSIS — G47.00 INSOMNIA, UNSPECIFIED TYPE: ICD-10-CM

## 2025-05-12 DIAGNOSIS — I10 ESSENTIAL HYPERTENSION: ICD-10-CM

## 2025-05-13 RX ORDER — TRAZODONE HYDROCHLORIDE 100 MG/1
100 TABLET ORAL
Qty: 90 TABLET | Refills: 11 | Status: SHIPPED | OUTPATIENT
Start: 2025-05-13

## 2025-05-13 RX ORDER — PROPRANOLOL HYDROCHLORIDE 80 MG/1
80 TABLET ORAL 2 TIMES DAILY
Qty: 180 TABLET | Refills: 1 | Status: SHIPPED | OUTPATIENT
Start: 2025-05-13

## 2025-06-13 ENCOUNTER — TELEPHONE (OUTPATIENT)
Dept: INTERNAL MEDICINE | Facility: CLINIC | Age: 65
End: 2025-06-13
Payer: MEDICARE

## 2025-06-17 ENCOUNTER — EXTERNAL PBMM DATA (OUTPATIENT)
Dept: PHARMACY | Facility: OTHER | Age: 65
End: 2025-06-17
Payer: MEDICARE

## 2025-06-23 ENCOUNTER — POP HEALTH PHARMACY (OUTPATIENT)
Dept: PHARMACY | Facility: OTHER | Age: 65
End: 2025-06-23
Payer: MEDICARE

## 2025-06-23 DIAGNOSIS — K21.9 GASTROESOPHAGEAL REFLUX DISEASE, UNSPECIFIED WHETHER ESOPHAGITIS PRESENT: ICD-10-CM

## 2025-06-23 DIAGNOSIS — E78.2 MIXED HYPERLIPIDEMIA: ICD-10-CM

## 2025-06-23 DIAGNOSIS — F41.9 ANXIETY: ICD-10-CM

## 2025-06-23 DIAGNOSIS — I10 ESSENTIAL HYPERTENSION: ICD-10-CM

## 2025-06-23 RX ORDER — LOSARTAN POTASSIUM 100 MG/1
100 TABLET ORAL DAILY
Qty: 30 TABLET | Refills: 0 | Status: SHIPPED | OUTPATIENT
Start: 2025-06-23

## 2025-06-23 RX ORDER — CITALOPRAM HYDROBROMIDE 40 MG/1
40 TABLET ORAL DAILY
Qty: 30 TABLET | Refills: 0 | Status: SHIPPED | OUTPATIENT
Start: 2025-06-23

## 2025-06-23 RX ORDER — ATORVASTATIN CALCIUM 10 MG/1
10 TABLET, FILM COATED ORAL DAILY
Qty: 30 TABLET | Refills: 0 | Status: SHIPPED | OUTPATIENT
Start: 2025-06-23

## 2025-06-23 RX ORDER — ASPIRIN 81 MG/1
81 TABLET ORAL DAILY
Qty: 30 TABLET | Refills: 0 | Status: SHIPPED | OUTPATIENT
Start: 2025-06-23

## 2025-06-23 RX ORDER — HYDROCHLOROTHIAZIDE 25 MG/1
25 TABLET ORAL DAILY
Qty: 30 TABLET | Refills: 0 | Status: SHIPPED | OUTPATIENT
Start: 2025-06-23

## 2025-06-23 RX ORDER — OMEPRAZOLE 20 MG/1
20 CAPSULE, DELAYED RELEASE ORAL 2 TIMES DAILY
Qty: 60 CAPSULE | Refills: 0 | Status: SHIPPED | OUTPATIENT
Start: 2025-06-23

## 2025-06-23 NOTE — PROGRESS NOTES
Sent 30 day supply of medications prescribed by PCP to last patient until her appointment in  July.

## 2025-06-23 NOTE — PROGRESS NOTES
Population Health Pharmacy Outreach      Cindy Bella was called today to discuss medication adherence with ATORVASTATIN CALCIUM ([facility] COA REDUCTASE INHIBITORS)  LOSARTAN POTASSIUM (ANGIOTENSIN II RECEPTOR ANTAGONISTS) , as she was identified as having care opportunities.    Program Details    [facility]  Status: Enrolled  Effective Dates: 6/18/2025 - present  Responsible Staff: Catalina Doll Identified    Adherence- Cholesterol  Adherence- Hypertension       Adherence and Medication Management    Adherence Questions   Reasons for Non-Adherence : No problems identified  Interested in a 90 day supply? : Yes         General Medication Management    Type of medication management: targeted medication review  Review Completed on: 6/23/25  Referred by: insurance group  Recipient: beneficiary  Provider: pharmacist - other  Visit type: initial  Method of contact: synchronous telehealth - telephone           Medication Therapy Problems     Medication Therapy Recommendations  No medication therapy recommendations to display      Summary    Message sent to clinical pool requesting 90 day supply    Medication Management Summary    Topics discussed: recommendations to doctor discussed  Time spent: 16-30 min         Catalina Doll, 06/23/25, 12:17 PM EDT.

## 2025-06-27 ENCOUNTER — EXTERNAL PBMM DATA (OUTPATIENT)
Dept: PHARMACY | Facility: OTHER | Age: 65
End: 2025-06-27
Payer: MEDICARE

## 2025-07-11 ENCOUNTER — EXTERNAL PBMM DATA (OUTPATIENT)
Dept: PHARMACY | Facility: OTHER | Age: 65
End: 2025-07-11
Payer: MEDICARE

## 2025-07-18 RX ORDER — MEGESTROL ACETATE 20 MG/1
20 TABLET ORAL DAILY
Qty: 90 TABLET | Refills: 1 | Status: SHIPPED | OUTPATIENT
Start: 2025-07-18

## 2025-07-22 ENCOUNTER — POP HEALTH PHARMACY (OUTPATIENT)
Dept: PHARMACY | Facility: OTHER | Age: 65
End: 2025-07-22
Payer: MEDICARE

## 2025-07-23 DIAGNOSIS — K21.9 GASTROESOPHAGEAL REFLUX DISEASE, UNSPECIFIED WHETHER ESOPHAGITIS PRESENT: ICD-10-CM

## 2025-07-23 DIAGNOSIS — F17.210 CIGARETTE NICOTINE DEPENDENCE WITHOUT COMPLICATION: ICD-10-CM

## 2025-07-23 RX ORDER — OMEPRAZOLE 20 MG/1
20 CAPSULE, DELAYED RELEASE ORAL 2 TIMES DAILY
Qty: 180 CAPSULE | Refills: 11 | Status: SHIPPED | OUTPATIENT
Start: 2025-07-23

## 2025-07-23 RX ORDER — BUDESONIDE, GLYCOPYRROLATE, AND FORMOTEROL FUMARATE 160; 9; 4.8 UG/1; UG/1; UG/1
2 AEROSOL, METERED RESPIRATORY (INHALATION) 2 TIMES DAILY
Qty: 32.1 G | Refills: 11 | OUTPATIENT
Start: 2025-07-23

## 2025-07-23 NOTE — PROGRESS NOTES
Chief Complaint  Hypertension, Weight Loss, Nausea, and Cough (Ongoing cough )    Subjective          Cindy Bella presents to [facility]  History of Present Illness    History of Present Illness  The patient presents for evaluation of depression, nausea, back pain, and respiratory issues.    She has been experiencing persistent fatigue over the past few days, which she attributes to her current medication regimen. She is currently on Celexa, which she finds more effective than previous treatments, and BuSpar 15 mg three times daily. Despite these medications, she continues to struggle with depression and is seeking additional treatment options.    She has also noticed a decrease in her appetite and weight loss, which she believes may be related to her depression. She is on Prilosec 20 mg twice daily. She reports constant nausea, which she suspects may be due to stress or her medications. She has tried various anti-nausea medications without success.    She is requesting a refill of her breathing medication, as she only has one dose left. She experiences a constant cough with clear phlegm and does not find her current inhaler, Trelegy, effective.    She is requesting a muscle relaxer for her back pain. She has previously taken Robaxin, which provided some relief, but not enough. She has also tried Elavil in the past. She has been performing spine exercises and has seen a spine specialist at [facility].    She is requesting a B12 injection today.    Tobacco: She reports smoking cigarettes.  Sleep: She reports difficulty sleeping and is currently on trazodone.    Hyperlipidemia  This is a chronic problem. The problem is controlled. She has no history of chronic renal disease, diabetes, hypothyroidism, liver disease, obesity or nephrotic syndrome. Pertinent negatives include no chest pain, focal sensory loss, focal weakness, leg pain, myalgias or  shortness of breath. Compliance problems include adherence to diet and adherence to exercise.      Hypertension  This is a chronic problem. Associated symptoms include anxiety. Pertinent negatives include no blurred vision, chest pain, headaches, malaise/fatigue, neck pain, orthopnea, palpitations, peripheral edema, PND, shortness of breath or sweats. Compliance problems include diet and exercise.  There is no history of angina, kidney disease or CAD/MI. There is no history of chronic renal disease.     Has not been taking blood pressure at home.     Anxiety  Symptoms include irritability. Patient reports no chest pain, decreased concentration, insomnia, palpitations or shortness of breath.     Her past medical history is significant for depression.   DepressionPatient presents with the following symptoms: irritability.  Patient is not experiencing: decreased concentration, insomnia, palpitations, shortness of breath and weight loss.      Diarrhea   This is a recurrent problem. The stool consistency is described as Watery. Pertinent negatives include no abdominal pain, arthralgias, bloating, chills, coughing, fever, headaches, increased  flatus, myalgias, sweats, URI, vomiting or weight loss. Treatments tried: dicyclomine. caused constipation - has not been taking it.   COPD  There is no chest tightness, cough, difficulty breathing, frequent throat clearing, hemoptysis or shortness of breath. Pertinent negatives include no chest pain, fever, headaches, malaise/fatigue, myalgias, PND, sore throat, sweats or weight loss.   Heartburn  She reports no abdominal pain, no chest pain, no coughing or no sore throat. Pertinent negatives include no fatigue or weight loss.   Current Outpatient Medications   Medication Instructions    albuterol (PROVENTIL) 2.5 mg, Nebulization, 4 Times Daily PRN    albuterol sulfate  (90 Base) MCG/ACT inhaler 2 puffs, Every 4 Hours PRN    amLODIPine (NORVASC) 2.5 mg, Daily    aspirin  "(ASPIRIN LOW DOSE) 81 mg, Oral, Daily    atorvastatin (LIPITOR) 10 mg, Oral, Daily    brompheniramine-pseudoephedrine-DM 30-2-10 MG/5ML syrup 10 mL, Oral, 4 Times Daily PRN    Budeson-Glycopyrrol-Formoterol (Breztri Aerosphere) 160-9-4.8 MCG/ACT aerosol inhaler 2 puffs, Inhalation, 2 Times Daily    busPIRone (BUSPAR) 30 mg, Oral, 2 Times Daily    citalopram (CELEXA) 40 mg, Oral, Daily    famotidine (PEPCID) 40 mg, Oral, Daily    hydroCHLOROthiazide 25 mg, Oral, Daily    losartan (COZAAR) 100 mg, Oral, Daily    megestrol (MEGACE) 20 mg, Oral, Daily    methocarbamol (ROBAXIN) 750 mg, Oral, 3 Times Daily    omeprazole (PRILOSEC) 20 mg, Oral, 2 Times Daily    propranolol (INDERAL) 80 mg, Oral, 2 Times Daily    roflumilast (DALIRESP) 500 mcg, Oral, Daily    traZODone (DESYREL) 200 mg, Oral, Nightly    tretinoin (Retin-A) 0.1 % cream 1 Application, Topical, Nightly       The following portions of the patient's history were reviewed and updated as appropriate: allergies, current medications, past family history, past medical history, past social history, past surgical history, and problem list.    Objective   Vital Signs:   /88   Pulse 72   Temp 98.4 °F (36.9 °C)   Ht 160 cm (63\")   Wt 40.4 kg (89 lb)   SpO2 95%   BMI 15.77 kg/m²     BP Readings from Last 3 Encounters:   07/25/25 162/88   12/26/24 110/66   12/09/24 107/70     Wt Readings from Last 3 Encounters:   07/25/25 40.4 kg (89 lb)   12/26/24 41.5 kg (91 lb 9.6 oz)   12/09/24 42.1 kg (92 lb 12.8 oz)           Physical Exam     Appearance: No acute distress, well-nourished  Head: normocephalic, atraumatic  Eyes: extraocular movements intact, no scleral icterus, no conjunctival injection  Ears, Nose, and Throat: external ears normal, nares patent, moist mucous membranes  Cardiovascular: regular rate and rhythm. no murmurs, rubs, or gallops. no edema  Respiratory: breathing comfortably, symmetric chest rise, clear to auscultation bilaterally. No wheezes, " rales, or rhonchi.  Neuro: alert and oriented to time, place, and person. Normal gait  Psych: normal mood and affect     Physical Exam        Result Review :   The following data was reviewed by: BROOKLYN Guillaume on 07/25/2025:  Common labs          11/29/2024    14:21 11/30/2024    04:18 7/25/2025    08:57   Common Labs   Glucose 102  128  79    BUN 8  13  5.0    Creatinine 0.73  0.59  0.70    Sodium 140  136  140    Potassium 3.6  3.2  3.5    Chloride 101  99  102    Calcium 10.1  9.7  10.6    Albumin 4.4   4.3    Total Bilirubin 0.6   0.5    Alkaline Phosphatase 86   82    AST (SGOT) 20   24    ALT (SGPT) 13   12    WBC 9.33  6.63  7.83    Hemoglobin 14.5  13.4  14.8    Hematocrit 43.4  40.1  44.3    Platelets 240  223  283    Total Cholesterol   120    Triglycerides   68    HDL Cholesterol   48    LDL Cholesterol    58        Results           Lab Results   Component Value Date    SARSANTIGEN Not Detected 12/26/2024    COVID19 Not Detected 11/30/2024    FLUAAG Not Detected 12/26/2024    FLUBAG Not Detected 12/26/2024    RAPSCRN Negative 12/26/2024    RSV Not Detected 11/30/2024            Assessment and Plan    Diagnoses and all orders for this visit:    1. Essential hypertension (Primary)  -     Lipid Panel  -     Comprehensive Metabolic Panel  -     CBC & Differential    2. Mixed hyperlipidemia  -     Lipid Panel  -     Comprehensive Metabolic Panel  -     CBC & Differential    3. COPD with acute exacerbation    4. CHRISS (generalized anxiety disorder)  -     busPIRone (BUSPAR) 30 MG tablet; Take 1 tablet by mouth 2 (Two) Times a Day.  Dispense: 180 tablet; Refill: 1  -     Ambulatory Referral to Psychiatry  -     Ambulatory Referral to Psychology    5. Gastroesophageal reflux disease, unspecified whether esophagitis present  Overview:  Stable on Prilosec, continue current dose    Orders:  -     famotidine (Pepcid) 40 MG tablet; Take 1 tablet by mouth Daily.  Dispense: 90 tablet; Refill: 1    6. Chronic  obstructive pulmonary disease, unspecified COPD type  -     roflumilast (Daliresp) 500 MCG tablet tablet; Take 1 tablet by mouth Daily.  Dispense: 30 tablet; Refill: 11  -     Discontinue: Budeson-Glycopyrrol-Formoterol (Breztri Aerosphere) 160-9-4.8 MCG/ACT aerosol inhaler; Inhale 2 puffs 2 (Two) Times a Day.  Dispense: 10.7 g; Refill: 3  -     Budeson-Glycopyrrol-Formoterol (Breztri Aerosphere) 160-9-4.8 MCG/ACT aerosol inhaler; Inhale 2 puffs 2 (Two) Times a Day.  Dispense: 10.7 g; Refill: 3    7. Insomnia, unspecified type  -     traZODone (DESYREL) 100 MG tablet; Take 2 tablets by mouth Every Night.  Dispense: 60 tablet; Refill: 3  -     Ambulatory Referral to Psychiatry  -     Ambulatory Referral to Psychology    8. Nicotine dependence with nicotine-induced disorder, unspecified nicotine product type    9. Chronic bilateral back pain, unspecified back location  -     Ambulatory Referral to Pain Management    10. Encounter for screening mammogram for malignant neoplasm of breast  -     Mammo Screening Digital Tomosynthesis Bilateral With CAD; Future    11. Post-menopausal  -     DEXA Bone Density Axial; Future  -     Vitamin D 25 hydroxy    12. Screening for osteoporosis  -     DEXA Bone Density Axial; Future    13. B12 deficiency  -     Vitamin B12  -     Folate    14. Unspecified severe protein-calorie malnutrition  -     Vitamin D 25 hydroxy    15. Moderate episode of recurrent major depressive disorder  -     Ambulatory Referral to Psychiatry  -     Ambulatory Referral to Psychology        Assessment & Plan  1. Depression.  - Reports that Celexa is working better than any other medication tried but still feels overwhelmed.  - Physical exam findings indicate weight loss and persistent tiredness.  - Discussed increasing BuSpar dosage and referral to a psychiatrist for telehealth consultation.  - BuSpar dosage will be increased to 15 mg three times a day.    2. Nausea.  - Experiences constant nausea, possibly  related to stress or medication.  - Physical exam findings indicate no improvement with current nausea medication.  - Discussed adding Pepcid to current regimen of Prilosec 20 mg twice a day.  - Pepcid will be added to her current regimen.    3. Respiratory issues.  - Reports current inhaler is not effective and experiences clear phlegm and constant coughing.  - Physical exam findings indicate need for increased dosage of Daliresp.  - Discussed switching inhaler from Trelegy to Breztri.  - Dosage of Daliresp will be increased to 500 mcg and inhaler switched to Breztri.    4. Back pain.  - Reports significant back pain and requests a muscle relaxer that works better than Robaxin.  - Physical exam findings indicate persistent back pain.  - Discussed option of pain management for trigger point injections and ablations.  - Will continue with Robaxin as it is deemed the best muscle relaxer for her condition.    Medications Discontinued During This Encounter   Medication Reason    busPIRone (BUSPAR) 10 MG tablet     busPIRone (BUSPAR) 15 MG tablet     methylPREDNISolone (MEDROL) 4 MG dose pack Historical Med - Therapy completed    roflumilast (Daliresp) 250 MCG tablet tablet Historical Med - Therapy completed    Fluticasone-Umeclidin-Vilant (TRELEGY ELLIPTA) 200-62.5-25 MCG/ACT inhaler Historical Med - Therapy completed    roflumilast (Daliresp) 500 MCG tablet tablet Reorder    traZODone (DESYREL) 100 MG tablet     Budeson-Glycopyrrol-Formoterol (Breztri Aerosphere) 160-9-4.8 MCG/ACT aerosol inhaler Reorder          Follow Up   Return in about 3 months (around 10/25/2025) for Hypertension.  Patient was given instructions and counseling regarding her condition or for health maintenance advice. Please see specific information pulled into the AVS if appropriate.       BROOKLYN Guillaume  08/01/25  08:16 EDT      Patient or patient representative verbalized consent for the use of Ambient Listening during the visit with   BROOKLYN Guillaume for chart documentation. 8/1/2025  08:32 EDT

## 2025-07-24 ENCOUNTER — EXTERNAL PBMM DATA (OUTPATIENT)
Dept: PHARMACY | Facility: OTHER | Age: 65
End: 2025-07-24
Payer: MEDICARE

## 2025-07-25 ENCOUNTER — OFFICE VISIT (OUTPATIENT)
Dept: INTERNAL MEDICINE | Facility: CLINIC | Age: 65
End: 2025-07-25
Payer: MEDICARE

## 2025-07-25 VITALS
HEART RATE: 72 BPM | TEMPERATURE: 98.4 F | WEIGHT: 89 LBS | SYSTOLIC BLOOD PRESSURE: 162 MMHG | BODY MASS INDEX: 15.77 KG/M2 | HEIGHT: 63 IN | DIASTOLIC BLOOD PRESSURE: 88 MMHG | OXYGEN SATURATION: 95 %

## 2025-07-25 DIAGNOSIS — E43 UNSPECIFIED SEVERE PROTEIN-CALORIE MALNUTRITION: ICD-10-CM

## 2025-07-25 DIAGNOSIS — Z13.820 SCREENING FOR OSTEOPOROSIS: ICD-10-CM

## 2025-07-25 DIAGNOSIS — I10 ESSENTIAL HYPERTENSION: Primary | ICD-10-CM

## 2025-07-25 DIAGNOSIS — F33.1 MODERATE EPISODE OF RECURRENT MAJOR DEPRESSIVE DISORDER: ICD-10-CM

## 2025-07-25 DIAGNOSIS — Z78.0 POST-MENOPAUSAL: ICD-10-CM

## 2025-07-25 DIAGNOSIS — K21.9 GASTROESOPHAGEAL REFLUX DISEASE, UNSPECIFIED WHETHER ESOPHAGITIS PRESENT: ICD-10-CM

## 2025-07-25 DIAGNOSIS — G47.00 INSOMNIA, UNSPECIFIED TYPE: ICD-10-CM

## 2025-07-25 DIAGNOSIS — J44.1 COPD WITH ACUTE EXACERBATION: ICD-10-CM

## 2025-07-25 DIAGNOSIS — F41.1 GAD (GENERALIZED ANXIETY DISORDER): Chronic | ICD-10-CM

## 2025-07-25 DIAGNOSIS — E78.2 MIXED HYPERLIPIDEMIA: ICD-10-CM

## 2025-07-25 DIAGNOSIS — J44.9 CHRONIC OBSTRUCTIVE PULMONARY DISEASE, UNSPECIFIED COPD TYPE: ICD-10-CM

## 2025-07-25 DIAGNOSIS — Z12.31 ENCOUNTER FOR SCREENING MAMMOGRAM FOR MALIGNANT NEOPLASM OF BREAST: ICD-10-CM

## 2025-07-25 DIAGNOSIS — E53.8 B12 DEFICIENCY: ICD-10-CM

## 2025-07-25 DIAGNOSIS — G89.29 CHRONIC BILATERAL BACK PAIN, UNSPECIFIED BACK LOCATION: ICD-10-CM

## 2025-07-25 DIAGNOSIS — M54.9 CHRONIC BILATERAL BACK PAIN, UNSPECIFIED BACK LOCATION: ICD-10-CM

## 2025-07-25 DIAGNOSIS — F17.209 NICOTINE DEPENDENCE WITH NICOTINE-INDUCED DISORDER, UNSPECIFIED NICOTINE PRODUCT TYPE: ICD-10-CM

## 2025-07-25 LAB
25(OH)D3 SERPL-MCNC: 53.5 NG/ML (ref 30–100)
ALBUMIN SERPL-MCNC: 4.3 G/DL (ref 3.5–5.2)
ALBUMIN/GLOB SERPL: 1.5 G/DL
ALP SERPL-CCNC: 82 U/L (ref 39–117)
ALT SERPL W P-5'-P-CCNC: 12 U/L (ref 1–33)
ANION GAP SERPL CALCULATED.3IONS-SCNC: 13 MMOL/L (ref 5–15)
AST SERPL-CCNC: 24 U/L (ref 1–32)
BASOPHILS # BLD AUTO: 0.05 10*3/MM3 (ref 0–0.2)
BASOPHILS NFR BLD AUTO: 0.6 % (ref 0–1.5)
BILIRUB SERPL-MCNC: 0.5 MG/DL (ref 0–1.2)
BUN SERPL-MCNC: 5 MG/DL (ref 8–23)
BUN/CREAT SERPL: 7.1 (ref 7–25)
CALCIUM SPEC-SCNC: 10.6 MG/DL (ref 8.6–10.5)
CHLORIDE SERPL-SCNC: 102 MMOL/L (ref 98–107)
CHOLEST SERPL-MCNC: 120 MG/DL (ref 0–200)
CO2 SERPL-SCNC: 25 MMOL/L (ref 22–29)
CREAT SERPL-MCNC: 0.7 MG/DL (ref 0.57–1)
DEPRECATED RDW RBC AUTO: 43.6 FL (ref 37–54)
EGFRCR SERPLBLD CKD-EPI 2021: 96.1 ML/MIN/1.73
EOSINOPHIL # BLD AUTO: 0.1 10*3/MM3 (ref 0–0.4)
EOSINOPHIL NFR BLD AUTO: 1.3 % (ref 0.3–6.2)
ERYTHROCYTE [DISTWIDTH] IN BLOOD BY AUTOMATED COUNT: 12.5 % (ref 12.3–15.4)
FOLATE SERPL-MCNC: 13.6 NG/ML (ref 4.78–24.2)
GLOBULIN UR ELPH-MCNC: 2.9 GM/DL
GLUCOSE SERPL-MCNC: 79 MG/DL (ref 65–99)
HCT VFR BLD AUTO: 44.3 % (ref 34–46.6)
HDLC SERPL-MCNC: 48 MG/DL (ref 40–60)
HGB BLD-MCNC: 14.8 G/DL (ref 12–15.9)
IMM GRANULOCYTES # BLD AUTO: 0.01 10*3/MM3 (ref 0–0.05)
IMM GRANULOCYTES NFR BLD AUTO: 0.1 % (ref 0–0.5)
LDLC SERPL CALC-MCNC: 58 MG/DL (ref 0–100)
LDLC/HDLC SERPL: 1.22 {RATIO}
LYMPHOCYTES # BLD AUTO: 2.52 10*3/MM3 (ref 0.7–3.1)
LYMPHOCYTES NFR BLD AUTO: 32.2 % (ref 19.6–45.3)
MCH RBC QN AUTO: 32.1 PG (ref 26.6–33)
MCHC RBC AUTO-ENTMCNC: 33.4 G/DL (ref 31.5–35.7)
MCV RBC AUTO: 96.1 FL (ref 79–97)
MONOCYTES # BLD AUTO: 0.56 10*3/MM3 (ref 0.1–0.9)
MONOCYTES NFR BLD AUTO: 7.2 % (ref 5–12)
NEUTROPHILS NFR BLD AUTO: 4.59 10*3/MM3 (ref 1.7–7)
NEUTROPHILS NFR BLD AUTO: 58.6 % (ref 42.7–76)
NRBC BLD AUTO-RTO: 0 /100 WBC (ref 0–0.2)
PLATELET # BLD AUTO: 283 10*3/MM3 (ref 140–450)
PMV BLD AUTO: 10 FL (ref 6–12)
POTASSIUM SERPL-SCNC: 3.5 MMOL/L (ref 3.5–5.2)
PROT SERPL-MCNC: 7.2 G/DL (ref 6–8.5)
RBC # BLD AUTO: 4.61 10*6/MM3 (ref 3.77–5.28)
SODIUM SERPL-SCNC: 140 MMOL/L (ref 136–145)
TRIGL SERPL-MCNC: 68 MG/DL (ref 0–150)
VIT B12 BLD-MCNC: 605 PG/ML (ref 211–946)
VLDLC SERPL-MCNC: 14 MG/DL (ref 5–40)
WBC NRBC COR # BLD AUTO: 7.83 10*3/MM3 (ref 3.4–10.8)

## 2025-07-25 PROCEDURE — 82306 VITAMIN D 25 HYDROXY: CPT | Performed by: NURSE PRACTITIONER

## 2025-07-25 PROCEDURE — 85025 COMPLETE CBC W/AUTO DIFF WBC: CPT | Performed by: NURSE PRACTITIONER

## 2025-07-25 PROCEDURE — 82607 VITAMIN B-12: CPT | Performed by: NURSE PRACTITIONER

## 2025-07-25 PROCEDURE — 80053 COMPREHEN METABOLIC PANEL: CPT | Performed by: NURSE PRACTITIONER

## 2025-07-25 PROCEDURE — 80061 LIPID PANEL: CPT | Performed by: NURSE PRACTITIONER

## 2025-07-25 PROCEDURE — 82746 ASSAY OF FOLIC ACID SERUM: CPT | Performed by: NURSE PRACTITIONER

## 2025-07-25 RX ORDER — TRAZODONE HYDROCHLORIDE 100 MG/1
200 TABLET ORAL NIGHTLY
Qty: 60 TABLET | Refills: 3 | Status: SHIPPED | OUTPATIENT
Start: 2025-07-25

## 2025-07-25 RX ORDER — ROFLUMILAST 500 UG/1
500 TABLET ORAL DAILY
Qty: 30 TABLET | Refills: 11 | Status: SHIPPED | OUTPATIENT
Start: 2025-07-25

## 2025-07-25 RX ORDER — BUSPIRONE HYDROCHLORIDE 30 MG/1
30 TABLET ORAL 2 TIMES DAILY
Qty: 180 TABLET | Refills: 1 | Status: SHIPPED | OUTPATIENT
Start: 2025-07-25

## 2025-07-25 RX ORDER — BUDESONIDE, GLYCOPYRROLATE, AND FORMOTEROL FUMARATE 160; 9; 4.8 UG/1; UG/1; UG/1
2 AEROSOL, METERED RESPIRATORY (INHALATION) 2 TIMES DAILY
Qty: 10.7 G | Refills: 3 | COMMUNITY
Start: 2025-07-25 | End: 2025-07-28 | Stop reason: SDUPTHER

## 2025-07-25 RX ORDER — FAMOTIDINE 40 MG/1
40 TABLET, FILM COATED ORAL DAILY
Qty: 90 TABLET | Refills: 1 | Status: SHIPPED | OUTPATIENT
Start: 2025-07-25

## 2025-07-28 RX ORDER — BUDESONIDE, GLYCOPYRROLATE, AND FORMOTEROL FUMARATE 160; 9; 4.8 UG/1; UG/1; UG/1
2 AEROSOL, METERED RESPIRATORY (INHALATION) 2 TIMES DAILY
Qty: 10.7 G | Refills: 3 | Status: SHIPPED | OUTPATIENT
Start: 2025-07-28

## 2025-08-06 DIAGNOSIS — K21.9 GASTROESOPHAGEAL REFLUX DISEASE, UNSPECIFIED WHETHER ESOPHAGITIS PRESENT: ICD-10-CM

## 2025-08-06 DIAGNOSIS — I10 ESSENTIAL HYPERTENSION: ICD-10-CM

## 2025-08-06 DIAGNOSIS — F41.9 ANXIETY: ICD-10-CM

## 2025-08-07 ENCOUNTER — EXTERNAL PBMM DATA (OUTPATIENT)
Dept: PHARMACY | Facility: OTHER | Age: 65
End: 2025-08-07
Payer: MEDICARE

## 2025-08-07 RX ORDER — LOSARTAN POTASSIUM 100 MG/1
100 TABLET ORAL DAILY
Qty: 30 TABLET | Refills: 0 | Status: SHIPPED | OUTPATIENT
Start: 2025-08-07

## 2025-08-07 RX ORDER — CITALOPRAM HYDROBROMIDE 40 MG/1
40 TABLET ORAL DAILY
Qty: 30 TABLET | Refills: 0 | Status: SHIPPED | OUTPATIENT
Start: 2025-08-07

## 2025-08-07 RX ORDER — HYDROCHLOROTHIAZIDE 25 MG/1
25 TABLET ORAL DAILY
Qty: 30 TABLET | Refills: 0 | Status: SHIPPED | OUTPATIENT
Start: 2025-08-07

## 2025-08-20 ENCOUNTER — TELEMEDICINE (OUTPATIENT)
Dept: INTERNAL MEDICINE | Facility: CLINIC | Age: 65
End: 2025-08-20
Payer: MEDICARE

## 2025-08-20 DIAGNOSIS — Z71.9 ENCOUNTER FOR CONSULTATION: ICD-10-CM

## 2025-08-20 DIAGNOSIS — J44.1 COPD WITH EXACERBATION: Primary | ICD-10-CM

## 2025-08-20 DIAGNOSIS — Z59.82 TRANSPORTATION INSECURITY: ICD-10-CM

## 2025-08-20 RX ORDER — AZITHROMYCIN 250 MG/1
TABLET, FILM COATED ORAL
Qty: 6 TABLET | Refills: 0 | Status: SHIPPED | OUTPATIENT
Start: 2025-08-20

## 2025-08-20 RX ORDER — BENZONATATE 100 MG/1
100 CAPSULE ORAL 3 TIMES DAILY PRN
Qty: 45 CAPSULE | Refills: 0 | Status: SHIPPED | OUTPATIENT
Start: 2025-08-20 | End: 2025-08-30

## 2025-08-20 RX ORDER — PREDNISONE 50 MG/1
50 TABLET ORAL DAILY
Qty: 5 TABLET | Refills: 0 | Status: SHIPPED | OUTPATIENT
Start: 2025-08-20 | End: 2025-08-25

## 2025-08-20 SDOH — ECONOMIC STABILITY - TRANSPORTATION SECURITY: TRANSPORTATION INSECURITY: Z59.82

## 2025-08-21 ENCOUNTER — EXTERNAL PBMM DATA (OUTPATIENT)
Dept: PHARMACY | Facility: OTHER | Age: 65
End: 2025-08-21
Payer: MEDICARE

## 2025-08-22 ENCOUNTER — HOSPITAL ENCOUNTER (OUTPATIENT)
Dept: MAMMOGRAPHY | Facility: HOSPITAL | Age: 65
Discharge: HOME | End: 2025-08-22
Payer: MEDICARE

## 2025-08-22 ENCOUNTER — HOSPITAL ENCOUNTER (OUTPATIENT)
Dept: BONE DENSITY | Facility: HOSPITAL | Age: 65
Discharge: HOME | End: 2025-08-22
Payer: MEDICARE

## 2025-08-22 ENCOUNTER — RESULTS FOLLOW-UP (OUTPATIENT)
Dept: INTERNAL MEDICINE | Facility: CLINIC | Age: 65
End: 2025-08-22
Payer: MEDICARE

## 2025-08-22 DIAGNOSIS — Z12.31 ENCOUNTER FOR SCREENING MAMMOGRAM FOR MALIGNANT NEOPLASM OF BREAST: ICD-10-CM

## 2025-08-22 DIAGNOSIS — Z78.0 POST-MENOPAUSAL: ICD-10-CM

## 2025-08-22 DIAGNOSIS — Z13.820 SCREENING FOR OSTEOPOROSIS: ICD-10-CM

## 2025-08-22 PROCEDURE — 77080 DXA BONE DENSITY AXIAL: CPT

## 2025-08-22 PROCEDURE — 77067 SCR MAMMO BI INCL CAD: CPT

## 2025-08-22 PROCEDURE — 77063 BREAST TOMOSYNTHESIS BI: CPT

## 2025-08-24 PROBLEM — Z59.82 TRANSPORTATION INSECURITY: Status: ACTIVE | Noted: 2025-08-24

## 2025-08-25 ENCOUNTER — TELEPHONE (OUTPATIENT)
Dept: INTERNAL MEDICINE | Facility: CLINIC | Age: 65
End: 2025-08-25
Payer: MEDICARE